# Patient Record
Sex: MALE | Race: OTHER | NOT HISPANIC OR LATINO
[De-identification: names, ages, dates, MRNs, and addresses within clinical notes are randomized per-mention and may not be internally consistent; named-entity substitution may affect disease eponyms.]

---

## 2017-01-09 ENCOUNTER — RX RENEWAL (OUTPATIENT)
Age: 62
End: 2017-01-09

## 2017-02-27 ENCOUNTER — RX RENEWAL (OUTPATIENT)
Age: 62
End: 2017-02-27

## 2017-03-08 ENCOUNTER — APPOINTMENT (OUTPATIENT)
Dept: INTERNAL MEDICINE | Facility: CLINIC | Age: 62
End: 2017-03-08

## 2017-03-08 VITALS
HEART RATE: 72 BPM | HEIGHT: 67 IN | WEIGHT: 216 LBS | SYSTOLIC BLOOD PRESSURE: 128 MMHG | DIASTOLIC BLOOD PRESSURE: 76 MMHG | BODY MASS INDEX: 33.9 KG/M2 | TEMPERATURE: 97.9 F

## 2017-03-10 LAB
25(OH)D3 SERPL-MCNC: 35.6 NG/ML
ALBUMIN SERPL ELPH-MCNC: 4.4 G/DL
ALP BLD-CCNC: 61 U/L
ALT SERPL-CCNC: 33 U/L
ANION GAP SERPL CALC-SCNC: 15 MMOL/L
AST SERPL-CCNC: 28 U/L
BASOPHILS # BLD AUTO: 0.04 K/UL
BASOPHILS NFR BLD AUTO: 0.5 %
BILIRUB SERPL-MCNC: 0.8 MG/DL
BUN SERPL-MCNC: 14 MG/DL
CALCIUM SERPL-MCNC: 9.5 MG/DL
CHLORIDE SERPL-SCNC: 102 MMOL/L
CO2 SERPL-SCNC: 24 MMOL/L
CREAT SERPL-MCNC: 0.97 MG/DL
EOSINOPHIL # BLD AUTO: 0.43 K/UL
EOSINOPHIL NFR BLD AUTO: 5.1 %
FOLATE SERPL-MCNC: 17.4 NG/ML
GLUCOSE SERPL-MCNC: 86 MG/DL
HCT VFR BLD CALC: 45.6 %
HGB BLD-MCNC: 15.6 G/DL
IMM GRANULOCYTES NFR BLD AUTO: 0.2 %
LYMPHOCYTES # BLD AUTO: 3.29 K/UL
LYMPHOCYTES NFR BLD AUTO: 39 %
MAGNESIUM SERPL-MCNC: 2.2 MG/DL
MAN DIFF?: NORMAL
MCHC RBC-ENTMCNC: 32.1 PG
MCHC RBC-ENTMCNC: 34.2 GM/DL
MCV RBC AUTO: 93.8 FL
MONOCYTES # BLD AUTO: 0.77 K/UL
MONOCYTES NFR BLD AUTO: 9.1 %
NEUTROPHILS # BLD AUTO: 3.89 K/UL
NEUTROPHILS NFR BLD AUTO: 46.1 %
PHOSPHATE SERPL-MCNC: 3.6 MG/DL
PLATELET # BLD AUTO: 177 K/UL
POTASSIUM SERPL-SCNC: 3.9 MMOL/L
PROT SERPL-MCNC: 7.1 G/DL
RBC # BLD: 4.86 M/UL
RBC # FLD: 12.7 %
SODIUM SERPL-SCNC: 141 MMOL/L
VIT B12 SERPL-MCNC: 445 PG/ML
WBC # FLD AUTO: 8.44 K/UL

## 2017-04-20 ENCOUNTER — APPOINTMENT (OUTPATIENT)
Dept: INTERNAL MEDICINE | Facility: CLINIC | Age: 62
End: 2017-04-20

## 2017-04-20 VITALS
RESPIRATION RATE: 17 BRPM | DIASTOLIC BLOOD PRESSURE: 80 MMHG | SYSTOLIC BLOOD PRESSURE: 130 MMHG | HEIGHT: 67 IN | HEART RATE: 75 BPM | WEIGHT: 218 LBS | BODY MASS INDEX: 34.21 KG/M2 | TEMPERATURE: 98.3 F

## 2017-06-13 ENCOUNTER — FORM ENCOUNTER (OUTPATIENT)
Age: 62
End: 2017-06-13

## 2017-06-14 ENCOUNTER — APPOINTMENT (OUTPATIENT)
Dept: PULMONOLOGY | Facility: HOSPITAL | Age: 62
End: 2017-06-14

## 2017-06-14 ENCOUNTER — OUTPATIENT (OUTPATIENT)
Dept: OUTPATIENT SERVICES | Facility: HOSPITAL | Age: 62
LOS: 1 days | End: 2017-06-14
Payer: MEDICAID

## 2017-06-14 ENCOUNTER — APPOINTMENT (OUTPATIENT)
Dept: CT IMAGING | Facility: HOSPITAL | Age: 62
End: 2017-06-14

## 2017-06-14 VITALS — BODY MASS INDEX: 29.63 KG/M2 | HEIGHT: 70 IN | WEIGHT: 207 LBS

## 2017-06-14 DIAGNOSIS — Z87.891 PERSONAL HISTORY OF NICOTINE DEPENDENCE: ICD-10-CM

## 2017-06-14 PROCEDURE — G0297: CPT

## 2017-07-12 ENCOUNTER — LABORATORY RESULT (OUTPATIENT)
Age: 62
End: 2017-07-12

## 2017-07-12 ENCOUNTER — APPOINTMENT (OUTPATIENT)
Dept: INTERNAL MEDICINE | Facility: CLINIC | Age: 62
End: 2017-07-12

## 2017-07-12 VITALS
TEMPERATURE: 98.2 F | BODY MASS INDEX: 30.64 KG/M2 | WEIGHT: 214 LBS | SYSTOLIC BLOOD PRESSURE: 126 MMHG | OXYGEN SATURATION: 97 % | HEIGHT: 70 IN | DIASTOLIC BLOOD PRESSURE: 86 MMHG | HEART RATE: 96 BPM

## 2017-07-13 LAB
ALBUMIN SERPL ELPH-MCNC: 4.8 G/DL
ALP BLD-CCNC: 64 U/L
ALT SERPL-CCNC: 36 U/L
ANION GAP SERPL CALC-SCNC: 16 MMOL/L
APPEARANCE: CLEAR
AST SERPL-CCNC: 32 U/L
BASOPHILS # BLD AUTO: 0.05 K/UL
BASOPHILS NFR BLD AUTO: 0.5 %
BILIRUB SERPL-MCNC: 1.4 MG/DL
BILIRUBIN URINE: NEGATIVE
BLOOD URINE: NEGATIVE
BUN SERPL-MCNC: 12 MG/DL
CALCIUM SERPL-MCNC: 9.7 MG/DL
CHLORIDE SERPL-SCNC: 101 MMOL/L
CO2 SERPL-SCNC: 24 MMOL/L
COLOR: YELLOW
CREAT SERPL-MCNC: 0.91 MG/DL
EOSINOPHIL # BLD AUTO: 0.46 K/UL
EOSINOPHIL NFR BLD AUTO: 4.8 %
GLUCOSE QUALITATIVE U: NORMAL MG/DL
GLUCOSE SERPL-MCNC: 84 MG/DL
HCT VFR BLD CALC: 46.5 %
HGB BLD-MCNC: 15.7 G/DL
IMM GRANULOCYTES NFR BLD AUTO: 0.4 %
KETONES URINE: NEGATIVE
LEUKOCYTE ESTERASE URINE: NEGATIVE
LYMPHOCYTES # BLD AUTO: 3.16 K/UL
LYMPHOCYTES NFR BLD AUTO: 33.3 %
MAN DIFF?: NORMAL
MCHC RBC-ENTMCNC: 31.1 PG
MCHC RBC-ENTMCNC: 33.8 GM/DL
MCV RBC AUTO: 92.1 FL
MONOCYTES # BLD AUTO: 1.09 K/UL
MONOCYTES NFR BLD AUTO: 11.5 %
NEUTROPHILS # BLD AUTO: 4.69 K/UL
NEUTROPHILS NFR BLD AUTO: 49.5 %
NITRITE URINE: NEGATIVE
PH URINE: 7
PLATELET # BLD AUTO: 191 K/UL
POTASSIUM SERPL-SCNC: 4.3 MMOL/L
PROT SERPL-MCNC: 8 G/DL
PROTEIN URINE: NEGATIVE MG/DL
PSA SERPL-MCNC: 0.9 NG/ML
RBC # BLD: 5.05 M/UL
RBC # FLD: 12.7 %
SODIUM SERPL-SCNC: 141 MMOL/L
SPECIFIC GRAVITY URINE: 1.01
UROBILINOGEN URINE: 1 MG/DL
WBC # FLD AUTO: 9.49 K/UL

## 2017-07-16 ENCOUNTER — FORM ENCOUNTER (OUTPATIENT)
Age: 62
End: 2017-07-16

## 2017-07-17 ENCOUNTER — APPOINTMENT (OUTPATIENT)
Dept: ULTRASOUND IMAGING | Facility: HOSPITAL | Age: 62
End: 2017-07-17

## 2017-07-17 ENCOUNTER — OUTPATIENT (OUTPATIENT)
Dept: OUTPATIENT SERVICES | Facility: HOSPITAL | Age: 62
LOS: 1 days | End: 2017-07-17
Payer: MEDICAID

## 2017-07-17 DIAGNOSIS — R10.811 RIGHT UPPER QUADRANT ABDOMINAL TENDERNESS: ICD-10-CM

## 2017-07-17 PROCEDURE — 76700 US EXAM ABDOM COMPLETE: CPT | Mod: 26

## 2017-07-17 PROCEDURE — 76700 US EXAM ABDOM COMPLETE: CPT

## 2017-07-18 ENCOUNTER — APPOINTMENT (OUTPATIENT)
Dept: INTERNAL MEDICINE | Facility: CLINIC | Age: 62
End: 2017-07-18

## 2017-07-18 ENCOUNTER — NON-APPOINTMENT (OUTPATIENT)
Age: 62
End: 2017-07-18

## 2017-07-18 VITALS
RESPIRATION RATE: 16 BRPM | DIASTOLIC BLOOD PRESSURE: 80 MMHG | TEMPERATURE: 97.8 F | HEIGHT: 70 IN | WEIGHT: 210 LBS | OXYGEN SATURATION: 98 % | SYSTOLIC BLOOD PRESSURE: 130 MMHG | BODY MASS INDEX: 30.06 KG/M2 | HEART RATE: 64 BPM

## 2017-07-19 ENCOUNTER — APPOINTMENT (OUTPATIENT)
Dept: UROLOGY | Facility: CLINIC | Age: 62
End: 2017-07-19

## 2017-07-19 VITALS
HEART RATE: 71 BPM | DIASTOLIC BLOOD PRESSURE: 92 MMHG | WEIGHT: 213 LBS | SYSTOLIC BLOOD PRESSURE: 150 MMHG | RESPIRATION RATE: 16 BRPM | HEIGHT: 70 IN | TEMPERATURE: 98.2 F | BODY MASS INDEX: 30.49 KG/M2

## 2017-07-19 DIAGNOSIS — Z87.39 PERSONAL HISTORY OF OTHER DISEASES OF THE MUSCULOSKELETAL SYSTEM AND CONNECTIVE TISSUE: ICD-10-CM

## 2017-08-16 ENCOUNTER — APPOINTMENT (OUTPATIENT)
Dept: INTERNAL MEDICINE | Facility: CLINIC | Age: 62
End: 2017-08-16

## 2018-02-23 ENCOUNTER — APPOINTMENT (OUTPATIENT)
Dept: INTERNAL MEDICINE | Facility: CLINIC | Age: 63
End: 2018-02-23
Payer: MEDICAID

## 2018-02-23 VITALS
DIASTOLIC BLOOD PRESSURE: 80 MMHG | WEIGHT: 221 LBS | HEIGHT: 70 IN | HEART RATE: 66 BPM | OXYGEN SATURATION: 98 % | SYSTOLIC BLOOD PRESSURE: 130 MMHG | TEMPERATURE: 98.1 F | BODY MASS INDEX: 31.64 KG/M2

## 2018-02-23 PROCEDURE — 99214 OFFICE O/P EST MOD 30 MIN: CPT

## 2018-02-23 RX ORDER — DEXTRAN 70, AND HYPROMELLOSE 2910 1; 3 MG/ML; MG/ML
SOLUTION/ DROPS OPHTHALMIC
Qty: 15 | Refills: 0 | Status: DISCONTINUED | COMMUNITY
Start: 2017-12-14 | End: 2018-02-23

## 2018-02-25 ENCOUNTER — RX RENEWAL (OUTPATIENT)
Age: 63
End: 2018-02-25

## 2018-02-25 LAB
25(OH)D3 SERPL-MCNC: 16.7 NG/ML
ALBUMIN SERPL ELPH-MCNC: 4.7 G/DL
ALP BLD-CCNC: 72 U/L
ALT SERPL-CCNC: 34 U/L
ANION GAP SERPL CALC-SCNC: 14 MMOL/L
APPEARANCE: CLEAR
AST SERPL-CCNC: 31 U/L
BASOPHILS # BLD AUTO: 0.05 K/UL
BASOPHILS NFR BLD AUTO: 0.6 %
BILIRUB SERPL-MCNC: 0.8 MG/DL
BILIRUBIN URINE: NEGATIVE
BLOOD URINE: NEGATIVE
BUN SERPL-MCNC: 12 MG/DL
CALCIUM SERPL-MCNC: 10.4 MG/DL
CHLORIDE SERPL-SCNC: 103 MMOL/L
CHOLEST SERPL-MCNC: 265 MG/DL
CHOLEST/HDLC SERPL: 4.8 RATIO
CO2 SERPL-SCNC: 25 MMOL/L
COLOR: YELLOW
CREAT SERPL-MCNC: 1.04 MG/DL
CREAT SPEC-SCNC: 80 MG/DL
EOSINOPHIL # BLD AUTO: 0.57 K/UL
EOSINOPHIL NFR BLD AUTO: 7.1 %
ERYTHROCYTE [SEDIMENTATION RATE] IN BLOOD BY WESTERGREN METHOD: 8 MM/HR
FOLATE SERPL-MCNC: >20 NG/ML
GGT SERPL-CCNC: 42 U/L
GLUCOSE QUALITATIVE U: NEGATIVE MG/DL
GLUCOSE SERPL-MCNC: 90 MG/DL
HBA1C MFR BLD HPLC: 5.4 %
HCT VFR BLD CALC: 51.2 %
HDLC SERPL-MCNC: 55 MG/DL
HGB BLD-MCNC: 16.9 G/DL
IMM GRANULOCYTES NFR BLD AUTO: 0.2 %
IRON SATN MFR SERPL: 20 %
IRON SERPL-MCNC: 68 UG/DL
KETONES URINE: NEGATIVE
LDLC SERPL CALC-MCNC: 187 MG/DL
LEUKOCYTE ESTERASE URINE: NEGATIVE
LYMPHOCYTES # BLD AUTO: 2.71 K/UL
LYMPHOCYTES NFR BLD AUTO: 33.6 %
MAN DIFF?: NORMAL
MCHC RBC-ENTMCNC: 31.8 PG
MCHC RBC-ENTMCNC: 33 GM/DL
MCV RBC AUTO: 96.2 FL
MICROALBUMIN 24H UR DL<=1MG/L-MCNC: 0.3 MG/DL
MICROALBUMIN/CREAT 24H UR-RTO: 4 MG/G
MONOCYTES # BLD AUTO: 0.62 K/UL
MONOCYTES NFR BLD AUTO: 7.7 %
NEUTROPHILS # BLD AUTO: 4.1 K/UL
NEUTROPHILS NFR BLD AUTO: 50.8 %
NITRITE URINE: NEGATIVE
PH URINE: 5.5
PLATELET # BLD AUTO: 182 K/UL
POTASSIUM SERPL-SCNC: 4.9 MMOL/L
PROT SERPL-MCNC: 7.9 G/DL
PROTEIN URINE: NEGATIVE MG/DL
RBC # BLD: 5.32 M/UL
RBC # FLD: 13 %
SODIUM SERPL-SCNC: 142 MMOL/L
SPECIFIC GRAVITY URINE: 1.01
T3 SERPL-MCNC: 133 NG/DL
T4 FREE SERPL-MCNC: 1.2 NG/DL
TIBC SERPL-MCNC: 334 UG/DL
TRIGL SERPL-MCNC: 114 MG/DL
TSH SERPL-ACNC: 2.17 UIU/ML
UIBC SERPL-MCNC: 266 UG/DL
UROBILINOGEN URINE: NEGATIVE MG/DL
VIT B12 SERPL-MCNC: 539 PG/ML
WBC # FLD AUTO: 8.07 K/UL

## 2018-02-28 ENCOUNTER — APPOINTMENT (OUTPATIENT)
Dept: INTERNAL MEDICINE | Facility: CLINIC | Age: 63
End: 2018-02-28

## 2018-06-19 ENCOUNTER — APPOINTMENT (OUTPATIENT)
Dept: CT IMAGING | Facility: HOSPITAL | Age: 63
End: 2018-06-19
Payer: MEDICAID

## 2018-06-19 ENCOUNTER — APPOINTMENT (OUTPATIENT)
Dept: PULMONOLOGY | Facility: HOSPITAL | Age: 63
End: 2018-06-19
Payer: MEDICAID

## 2018-06-19 ENCOUNTER — OUTPATIENT (OUTPATIENT)
Dept: OUTPATIENT SERVICES | Facility: HOSPITAL | Age: 63
LOS: 1 days | End: 2018-06-19
Payer: MEDICAID

## 2018-06-19 VITALS — BODY MASS INDEX: 29.63 KG/M2 | WEIGHT: 207 LBS | HEIGHT: 70 IN

## 2018-06-19 DIAGNOSIS — Z87.891 PERSONAL HISTORY OF NICOTINE DEPENDENCE: ICD-10-CM

## 2018-06-19 PROCEDURE — G0296 VISIT TO DETERM LDCT ELIG: CPT

## 2018-06-19 PROCEDURE — 99406 BEHAV CHNG SMOKING 3-10 MIN: CPT

## 2018-06-19 PROCEDURE — G0297: CPT

## 2018-06-19 PROCEDURE — G0297: CPT | Mod: 26

## 2018-06-20 ENCOUNTER — APPOINTMENT (OUTPATIENT)
Dept: PULMONOLOGY | Facility: HOSPITAL | Age: 63
End: 2018-06-20

## 2018-06-20 ENCOUNTER — APPOINTMENT (OUTPATIENT)
Dept: CT IMAGING | Facility: HOSPITAL | Age: 63
End: 2018-06-20

## 2018-07-17 ENCOUNTER — APPOINTMENT (OUTPATIENT)
Dept: UROLOGY | Facility: CLINIC | Age: 63
End: 2018-07-17
Payer: MEDICAID

## 2018-07-17 VITALS
SYSTOLIC BLOOD PRESSURE: 142 MMHG | DIASTOLIC BLOOD PRESSURE: 80 MMHG | HEART RATE: 62 BPM | OXYGEN SATURATION: 97 % | TEMPERATURE: 98.2 F

## 2018-07-17 PROCEDURE — 99213 OFFICE O/P EST LOW 20 MIN: CPT

## 2018-08-01 ENCOUNTER — APPOINTMENT (OUTPATIENT)
Dept: INTERNAL MEDICINE | Facility: CLINIC | Age: 63
End: 2018-08-01
Payer: MEDICAID

## 2018-08-01 VITALS
DIASTOLIC BLOOD PRESSURE: 84 MMHG | HEIGHT: 70 IN | BODY MASS INDEX: 31.07 KG/M2 | SYSTOLIC BLOOD PRESSURE: 130 MMHG | HEART RATE: 74 BPM | OXYGEN SATURATION: 98 % | TEMPERATURE: 98.3 F | WEIGHT: 217 LBS

## 2018-08-01 PROCEDURE — 99203 OFFICE O/P NEW LOW 30 MIN: CPT | Mod: 25

## 2018-08-01 PROCEDURE — 83014 H PYLORI DRUG ADMIN: CPT

## 2018-08-01 PROCEDURE — 82270 OCCULT BLOOD FECES: CPT

## 2018-08-03 LAB
25(OH)D3 SERPL-MCNC: 37.7 NG/ML
A1AT SERPL-MCNC: 123 MG/DL
ALBUMIN SERPL ELPH-MCNC: 4.6 G/DL
ALP BLD-CCNC: 66 U/L
ALT SERPL-CCNC: 23 U/L
ANA PAT FLD IF-IMP: ABNORMAL
ANA SER IF-ACNC: ABNORMAL
ANION GAP SERPL CALC-SCNC: 13 MMOL/L
AST SERPL-CCNC: 27 U/L
BILIRUB SERPL-MCNC: 0.5 MG/DL
BUN SERPL-MCNC: 12 MG/DL
CALCIUM SERPL-MCNC: 9.8 MG/DL
CHLORIDE SERPL-SCNC: 102 MMOL/L
CHOLEST SERPL-MCNC: 214 MG/DL
CHOLEST/HDLC SERPL: 5 RATIO
CO2 SERPL-SCNC: 27 MMOL/L
CREAT SERPL-MCNC: 0.92 MG/DL
FERRITIN SERPL-MCNC: 186 NG/ML
GLUCOSE SERPL-MCNC: 82 MG/DL
HBV SURFACE AB SER QL: NONREACTIVE
HBV SURFACE AG SER QL: REACTIVE
HCV AB SER QL: NONREACTIVE
HCV S/CO RATIO: 0.17 S/CO
HDLC SERPL-MCNC: 43 MG/DL
IRON SATN MFR SERPL: 24 %
IRON SERPL-MCNC: 76 UG/DL
LDLC SERPL CALC-MCNC: 146 MG/DL
MITOCHONDRIA AB SER IF-ACNC: NORMAL
POTASSIUM SERPL-SCNC: 3.9 MMOL/L
PROT SERPL-MCNC: 7.3 G/DL
SMOOTH MUSCLE AB SER QL IF: NORMAL
SODIUM SERPL-SCNC: 142 MMOL/L
TIBC SERPL-MCNC: 315 UG/DL
TRIGL SERPL-MCNC: 124 MG/DL
UIBC SERPL-MCNC: 239 UG/DL
UREA BREATH TEST QL: NEGATIVE

## 2018-08-05 LAB
HBV CORE IGG+IGM SER QL: REACTIVE
HEPATITIS A IGG ANTIBODY: REACTIVE

## 2018-08-14 ENCOUNTER — APPOINTMENT (OUTPATIENT)
Dept: INTERNAL MEDICINE | Facility: CLINIC | Age: 63
End: 2018-08-14
Payer: MEDICAID

## 2018-08-14 ENCOUNTER — NON-APPOINTMENT (OUTPATIENT)
Age: 63
End: 2018-08-14

## 2018-08-14 VITALS
HEART RATE: 68 BPM | SYSTOLIC BLOOD PRESSURE: 120 MMHG | WEIGHT: 213 LBS | BODY MASS INDEX: 30.49 KG/M2 | DIASTOLIC BLOOD PRESSURE: 80 MMHG | TEMPERATURE: 98.3 F | OXYGEN SATURATION: 97 % | HEIGHT: 70 IN | RESPIRATION RATE: 16 BRPM

## 2018-08-14 PROCEDURE — 93000 ELECTROCARDIOGRAM COMPLETE: CPT

## 2018-08-14 PROCEDURE — 99214 OFFICE O/P EST MOD 30 MIN: CPT | Mod: 25

## 2018-08-14 RX ORDER — TERBINAFINE HYDROCHLORIDE 250 MG/1
250 TABLET ORAL
Refills: 0 | Status: DISCONTINUED | COMMUNITY
End: 2018-08-14

## 2018-08-21 ENCOUNTER — LABORATORY RESULT (OUTPATIENT)
Age: 63
End: 2018-08-21

## 2018-08-28 ENCOUNTER — RX RENEWAL (OUTPATIENT)
Age: 63
End: 2018-08-28

## 2018-09-06 ENCOUNTER — APPOINTMENT (OUTPATIENT)
Dept: INTERNAL MEDICINE | Facility: CLINIC | Age: 63
End: 2018-09-06
Payer: MEDICAID

## 2018-09-06 VITALS
TEMPERATURE: 98.5 F | HEART RATE: 65 BPM | HEIGHT: 70 IN | SYSTOLIC BLOOD PRESSURE: 138 MMHG | WEIGHT: 215 LBS | OXYGEN SATURATION: 98 % | DIASTOLIC BLOOD PRESSURE: 78 MMHG | BODY MASS INDEX: 30.78 KG/M2

## 2018-09-06 DIAGNOSIS — K25.9 GASTRIC ULCER, UNSPECIFIED AS ACUTE OR CHRONIC, W/OUT HEMORRHAGE OR PERFORATION: ICD-10-CM

## 2018-09-06 PROCEDURE — 99214 OFFICE O/P EST MOD 30 MIN: CPT

## 2018-09-11 ENCOUNTER — FORM ENCOUNTER (OUTPATIENT)
Age: 63
End: 2018-09-11

## 2018-09-12 ENCOUNTER — APPOINTMENT (OUTPATIENT)
Dept: RADIOLOGY | Facility: HOSPITAL | Age: 63
End: 2018-09-12
Payer: MEDICAID

## 2018-09-12 ENCOUNTER — OUTPATIENT (OUTPATIENT)
Dept: OUTPATIENT SERVICES | Facility: HOSPITAL | Age: 63
LOS: 1 days | End: 2018-09-12
Payer: MEDICAID

## 2018-09-12 ENCOUNTER — APPOINTMENT (OUTPATIENT)
Dept: ULTRASOUND IMAGING | Facility: HOSPITAL | Age: 63
End: 2018-09-12
Payer: MEDICAID

## 2018-09-12 DIAGNOSIS — R10.13 EPIGASTRIC PAIN: ICD-10-CM

## 2018-09-12 DIAGNOSIS — K25.9 GASTRIC ULCER, UNSPECIFIED AS ACUTE OR CHRONIC, WITHOUT HEMORRHAGE OR PERFORATION: ICD-10-CM

## 2018-09-12 DIAGNOSIS — K44.9 DIAPHRAGMATIC HERNIA WITHOUT OBSTRUCTION OR GANGRENE: ICD-10-CM

## 2018-09-12 DIAGNOSIS — K76.0 FATTY (CHANGE OF) LIVER, NOT ELSEWHERE CLASSIFIED: ICD-10-CM

## 2018-09-12 PROCEDURE — 74245: CPT | Mod: 26

## 2018-09-12 PROCEDURE — 74245: CPT

## 2018-09-12 PROCEDURE — 76700 US EXAM ABDOM COMPLETE: CPT | Mod: 26

## 2018-09-12 PROCEDURE — 76700 US EXAM ABDOM COMPLETE: CPT

## 2018-10-03 ENCOUNTER — APPOINTMENT (OUTPATIENT)
Dept: HEPATOLOGY | Facility: CLINIC | Age: 63
End: 2018-10-03
Payer: MEDICAID

## 2018-10-03 VITALS
RESPIRATION RATE: 16 BRPM | WEIGHT: 215 LBS | TEMPERATURE: 98 F | SYSTOLIC BLOOD PRESSURE: 150 MMHG | OXYGEN SATURATION: 96 % | HEIGHT: 70 IN | DIASTOLIC BLOOD PRESSURE: 91 MMHG | HEART RATE: 58 BPM | BODY MASS INDEX: 30.78 KG/M2

## 2018-10-03 PROCEDURE — 99204 OFFICE O/P NEW MOD 45 MIN: CPT

## 2018-10-04 LAB
CHOLEST SERPL-MCNC: 185 MG/DL
CHOLEST/HDLC SERPL: 4.3 RATIO
HBA1C MFR BLD HPLC: 5.3 %
HDLC SERPL-MCNC: 43 MG/DL
INSULIN SERPL-MCNC: 13 UU/ML
LDLC SERPL CALC-MCNC: 128 MG/DL
TRIGL SERPL-MCNC: 70 MG/DL

## 2018-10-05 LAB
HBV CORE IGG+IGM SER QL: REACTIVE
HBV DNA # SERPL NAA+PROBE: 770 IU/ML
HBV E AB SER QL: POSITIVE
HBV E AG SER QL: NEGATIVE
HBV SURFACE AB SER QL: NONREACTIVE
HBV SURFACE AG SER QL: REACTIVE
HEPB DNA PCR LOG: 2.89 LOGIU/ML

## 2018-11-07 ENCOUNTER — APPOINTMENT (OUTPATIENT)
Dept: INTERNAL MEDICINE | Facility: CLINIC | Age: 63
End: 2018-11-07
Payer: MEDICAID

## 2018-11-07 VITALS
OXYGEN SATURATION: 96 % | HEIGHT: 70 IN | DIASTOLIC BLOOD PRESSURE: 76 MMHG | HEART RATE: 67 BPM | BODY MASS INDEX: 30.92 KG/M2 | SYSTOLIC BLOOD PRESSURE: 134 MMHG | WEIGHT: 216 LBS | TEMPERATURE: 98 F

## 2018-11-07 PROCEDURE — 99214 OFFICE O/P EST MOD 30 MIN: CPT

## 2018-11-12 ENCOUNTER — RX RENEWAL (OUTPATIENT)
Age: 63
End: 2018-11-12

## 2018-11-19 LAB
GLUCOSE 1H P LAC PO SERPL-MCNC: 91 MG/DL
GLUCOSE 30M P LAC PO SERPL-MCNC: 99 MG/DL
LACTOSE 1.5H P LAC PO SERPL-SCNC: 87 MG/DL
LACTOSE 2H P 50 G LAC PO SERPL-MCNC: 91 MG/DL
LACTOSE 3H P LAC PO SERPL-MCNC: 80 MG/DL
LACTOSE PRE FAST SERPL-MCNC: 88 MG/DL

## 2019-02-08 NOTE — ASSESSMENT
[FreeTextEntry1] : 63 year old male found to have stable Essential Hypertension, Hypercholesterolemia, Vitamin D Deficiency, NAFLD,with the current regimen, diet and life style modifications, as counseled. Prior results reviewed and discussed with the patient during today's examination. Plan as ordered.\par EKG showed NSR at the rate of 61 BPM, non-sp ST-T changes noted.\par

## 2019-02-08 NOTE — HEALTH RISK ASSESSMENT
[Discussed at today's visit] : Advance Directives Discussed at today's visit [Fully functional (bathing, dressing, toileting, transferring, walking, feeding)] : Fully functional (bathing, dressing, toileting, transferring, walking, feeding) [Fully functional (using the telephone, shopping, preparing meals, housekeeping, doing laundry, using] : Fully functional and needs no help or supervision to perform IADLs (using the telephone, shopping, preparing meals, housekeeping, doing laundry, using transportation, managing medications and managing finances) [No falls in past year] : Patient reported no falls in the past year [0] : 2) Feeling down, depressed, or hopeless: Not at all (0) [] : No [de-identified] : GI/URO/PULM [[id number]] : 0

## 2019-02-08 NOTE — HISTORY OF PRESENT ILLNESS
[Weight Loss ___ Pounds] : Weight loss of [unfilled] pounds [___ # of attempts] : [unfilled] weight lost attempts [Following Diet Successfully] : Following the diet successfully [___ times per week] : Patient exercises [unfilled] times per week [Following  treatment as advised] : Patient is following  treatment as advised [Contemplation] : Contemplation: patient is considering to lose weight within the next 6 months, patient did not attempt to lose weight in the last year. [Good understanding] : Patient has a good understanding of disease, goals and obesity follow-up plan [[employer] ] : provided by [employer]   [FreeTextEntry1] : [id number] [FreeTextEntry2] : Moses [de-identified] : 63 year old  male patient with history of stable Essential Hypertension, Hypercholesterolemia, Vitamin D Deficiency, NAFLD, history as stated, presented for follow up examination of Elevated BP checked. Patient is compliant with all medications. Denies shortness of breath, chest pain or abdominal pains at this time. ROS as stated.\par

## 2019-02-11 RX ORDER — SIMETHICONE 125 MG/1
125 TABLET, CHEWABLE ORAL
Qty: 48 | Refills: 0 | Status: DISCONTINUED | COMMUNITY
Start: 2018-11-07 | End: 2019-02-11

## 2019-02-12 ENCOUNTER — APPOINTMENT (OUTPATIENT)
Dept: INTERNAL MEDICINE | Facility: CLINIC | Age: 64
End: 2019-02-12
Payer: MEDICAID

## 2019-02-12 VITALS
SYSTOLIC BLOOD PRESSURE: 120 MMHG | TEMPERATURE: 97.5 F | HEART RATE: 73 BPM | HEIGHT: 70 IN | WEIGHT: 219 LBS | RESPIRATION RATE: 16 BRPM | DIASTOLIC BLOOD PRESSURE: 80 MMHG | OXYGEN SATURATION: 97 % | BODY MASS INDEX: 31.35 KG/M2

## 2019-02-12 PROCEDURE — 90472 IMMUNIZATION ADMIN EACH ADD: CPT

## 2019-02-12 PROCEDURE — 99214 OFFICE O/P EST MOD 30 MIN: CPT | Mod: 25

## 2019-02-12 PROCEDURE — G0008: CPT

## 2019-02-12 PROCEDURE — 90670 PCV13 VACCINE IM: CPT

## 2019-02-12 PROCEDURE — 90686 IIV4 VACC NO PRSV 0.5 ML IM: CPT

## 2019-02-12 NOTE — HISTORY OF PRESENT ILLNESS
[[employer] ] : provided by [employer]   [[facility]] : [id number] [FreeTextEntry2] : [place] [de-identified] : 63 year old  male patient with history of stable Essential Hypertension, Hypercholesterolemia, Vitamin D Deficiency, NAFLD,with , history as stated, presented for follow up examination. Patient is compliant with all medications. Denies shortness of breath, chest pain or abdominal pains at this time. ROS as stated.\par

## 2019-02-12 NOTE — ASSESSMENT
[FreeTextEntry1] : 63 year old male found to have stable Essential Hypertension, Hypercholesterolemia, Vitamin D Deficiency, NAFLD,with the current regimen, diet and life style modifications, as counseled. Prior results reviewed and discussed with the patient during today's examination. Plan as ordered.\par

## 2019-02-15 ENCOUNTER — RX RENEWAL (OUTPATIENT)
Age: 64
End: 2019-02-15

## 2019-02-16 LAB
25(OH)D3 SERPL-MCNC: 19 NG/ML
ALBUMIN SERPL ELPH-MCNC: 4.7 G/DL
ALP BLD-CCNC: 74 U/L
ALT SERPL-CCNC: 29 U/L
ANION GAP SERPL CALC-SCNC: 12 MMOL/L
APPEARANCE: CLEAR
AST SERPL-CCNC: 28 U/L
BASOPHILS # BLD AUTO: 0.08 K/UL
BASOPHILS NFR BLD AUTO: 0.7 %
BILIRUB SERPL-MCNC: 0.8 MG/DL
BILIRUBIN URINE: NEGATIVE
BLOOD URINE: NEGATIVE
BUN SERPL-MCNC: 16 MG/DL
CALCIUM SERPL-MCNC: 10.2 MG/DL
CHLORIDE SERPL-SCNC: 106 MMOL/L
CHOLEST SERPL-MCNC: 226 MG/DL
CHOLEST/HDLC SERPL: 4.6 RATIO
CO2 SERPL-SCNC: 27 MMOL/L
COLOR: YELLOW
CREAT SERPL-MCNC: 1.02 MG/DL
CREAT SPEC-SCNC: 126 MG/DL
EOSINOPHIL # BLD AUTO: 0.46 K/UL
EOSINOPHIL NFR BLD AUTO: 4.1 %
ERYTHROCYTE [SEDIMENTATION RATE] IN BLOOD BY WESTERGREN METHOD: 8 MM/HR
FOLATE SERPL-MCNC: 17.8 NG/ML
GGT SERPL-CCNC: 31 U/L
GLUCOSE QUALITATIVE U: NEGATIVE MG/DL
GLUCOSE SERPL-MCNC: 86 MG/DL
HBA1C MFR BLD HPLC: 5.4 %
HCT VFR BLD CALC: 48.7 %
HDLC SERPL-MCNC: 49 MG/DL
HGB BLD-MCNC: 16 G/DL
IMM GRANULOCYTES NFR BLD AUTO: 0.3 %
IRON SATN MFR SERPL: 18 %
IRON SERPL-MCNC: 59 UG/DL
KETONES URINE: NEGATIVE
LDLC SERPL CALC-MCNC: 152 MG/DL
LEUKOCYTE ESTERASE URINE: NEGATIVE
LYMPHOCYTES # BLD AUTO: 2.75 K/UL
LYMPHOCYTES NFR BLD AUTO: 24.4 %
MAN DIFF?: NORMAL
MCHC RBC-ENTMCNC: 31 PG
MCHC RBC-ENTMCNC: 32.9 GM/DL
MCV RBC AUTO: 94.4 FL
MICROALBUMIN 24H UR DL<=1MG/L-MCNC: <1.2 MG/DL
MICROALBUMIN/CREAT 24H UR-RTO: NORMAL
MONOCYTES # BLD AUTO: 1.02 K/UL
MONOCYTES NFR BLD AUTO: 9 %
NEUTROPHILS # BLD AUTO: 6.95 K/UL
NEUTROPHILS NFR BLD AUTO: 61.5 %
NITRITE URINE: NEGATIVE
PH URINE: 6
PLATELET # BLD AUTO: 205 K/UL
POTASSIUM SERPL-SCNC: 5 MMOL/L
PROT SERPL-MCNC: 8.1 G/DL
PROTEIN URINE: NEGATIVE MG/DL
PSA FREE FLD-MCNC: 33 %
PSA FREE SERPL-MCNC: 0.3 NG/ML
PSA SERPL-MCNC: 0.9 NG/ML
RBC # BLD: 5.16 M/UL
RBC # FLD: 13.4 %
SODIUM SERPL-SCNC: 145 MMOL/L
SPECIFIC GRAVITY URINE: 1.02
T3 SERPL-MCNC: 135 NG/DL
T4 FREE SERPL-MCNC: 1 NG/DL
TIBC SERPL-MCNC: 321 UG/DL
TRIGL SERPL-MCNC: 126 MG/DL
TSH SERPL-ACNC: 3.22 UIU/ML
UIBC SERPL-MCNC: 262 UG/DL
UROBILINOGEN URINE: NEGATIVE MG/DL
VIT B12 SERPL-MCNC: 462 PG/ML
WBC # FLD AUTO: 11.29 K/UL

## 2019-02-20 ENCOUNTER — APPOINTMENT (OUTPATIENT)
Dept: INTERNAL MEDICINE | Facility: CLINIC | Age: 64
End: 2019-02-20
Payer: MEDICAID

## 2019-02-20 VITALS
HEIGHT: 70 IN | DIASTOLIC BLOOD PRESSURE: 63 MMHG | WEIGHT: 217 LBS | OXYGEN SATURATION: 97 % | HEART RATE: 63 BPM | SYSTOLIC BLOOD PRESSURE: 153 MMHG | TEMPERATURE: 97.7 F | BODY MASS INDEX: 31.07 KG/M2

## 2019-02-20 PROCEDURE — 99204 OFFICE O/P NEW MOD 45 MIN: CPT

## 2019-02-20 NOTE — CONSULT LETTER
[Dear  ___] : Dear  [unfilled], [Courtesy Letter:] : I had the pleasure of seeing your patient, [unfilled], in my office today. [Please see my note below.] : Please see my note below. [Sincerely,] : Sincerely, [FreeTextEntry3] : Fei Martins Mdfacp

## 2019-02-20 NOTE — ASSESSMENT
[FreeTextEntry1] : diverticuli of small bowel He presently having no significant abd discomfort. he has not been taking lactaid before each meal which could be reason why he sometimes has increased gas.  he will continue to take probiotic daily.  Overall pt is doing well .   abdominal hernia small weakness in abdominal wall noted no incarceration and we discussed what this is and will watch it.  We discussed itf it became stuck he needs to go to er immediately .  he should avoid lifting heavy objects and discussed repair.  he will think about whether he wants to see a surgeon or just watch it.  lactose intolerance  Lactose intolerance is a condition that makes it hard for your body to digest milk and foods made with milk (called dairy products). If you have lactose intolerance and you eat dairy products, you can get diarrhea, belly pain, and gas.\par \par Lactose intolerance can affect anyone. But it is most common among , , and black people.\par \par In people who do not have lactose intolerance, the body makes a protein called an "enzyme" that breaks down lactose, the main form of sugar found in milk. In people who do have lactose intolerance, the body either does not make enough of the enzyme, or the enzyme does not work as well as it should. Also, some infections, such as you might get with food poisoning, can damage the enzyme. But if that happens, the problem usually goes away within a few weeks. Luckily, people with lactose intolerance can take an enzyme supplement to help with their problem.\par \par What are the symptoms of lactose intolerance? — The symptoms happen only after you eat dairy foods. They can include:\par \par ?Cramps or belly pain (usually around or below the belly button)\par \par ?Bloating (feeling like your belly is full of air)\par \par ?Gas\par \par ?Diarrhea (often it is bulky, foamy, and watery)\par \par ?Vomiting (this happens mostly in teens)\par \par \par Is there a test for lactose intolerance? — Yes, there are 2 ways to test for lactose intolerance. One is a breathing test, and one is a blood test. The breathing test is more common.\par \par Your doctor or nurse will tell you how to prepare for your test. You will not be able to eat or drink anything for several hours before the test. Plus, you might have to change your medicines or stop smoking for a while before the test.\par \par ?Lactose hydrogen breath test – For this test, you drink a liquid that has lactose in it. Then you breathe into a special machine every 30 minutes. The machine measures how much hydrogen you breathe out. People who have lactose intolerance breathe out more hydrogen than normal.\par \par \par ?Lactose tolerance test – For this test, you drink a liquid that has lactose in it. The doctor or nurse will take blood samples from you when the test starts, and again 1 and 2 hours later. If your blood has low levels of sugar after you drink the lactose, it means you probably have lactose intolerance.\par \par \par Should I see a doctor or nurse? — Yes. If you think you might have lactose intolerance, tell your doctor or nurse. He or she can ask you questions to make sure that there are no other problems.\par \par How is lactose intolerance treated? — Treatment differs depending on how severe the problem is. But in general, treatment can include:\par \par ?Eating less dairy food\par \par ?Finding non-dairy sources of nutrients (such as calcium and vitamin D) and protein\par \par ?Taking an enzyme supplement that will help you break down dairy foods\par \par \par How do I reduce the amount of dairy foods I eat? — You can start by cutting down but not stopping foods you know contain dairy. Dairy foods should be consumed with meals. Dairy foods include milk, cream, ice cream, yogurt, cheese, and butter. This table shows how much lactose is in some common dairy foods (table 1).\par \par Your doctor or nurse might suggest that you talk to a nutritionist to learn which foods have lactose. The nutritionist can also make sure that you get enough calcium and vitamin D in your diet.\par \par If you are really sensitive to dairy foods or lactose, you will also need to read the labels on everything you eat. Milk or lactose is sometimes added to foods you might not suspect, such as cereal, instant soups, and salad dressings. Check the ingredient list of foods for anything that might suggest lactose. Look for these words:\par \par ?Milk, "milk byproducts," "dry milk powder," and "dry milk solids"\par \par ?Lactose\par \par ?Whey (whey is milk that has gone sour)\par \par \par Although some medicines are made with lactose, most people who are lactose intolerant can handle the very small amount in medicines.\par \par Which enzyme supplement should I use? — There are many enzyme supplements to choose from, including Lactaid (tablets or liquid), Lactrase, LactAce, Dairy Ease, and Lactrol. You should take the supplement right before you start eating. If you forget, you can take it during the meal, but it might not work as well.\par \par The important thing to know is that each product works a bit differently for each person. Plus, none of them can break down every last bit of lactose, so some people still have symptoms even with an enzyme supplement.\par \par Should I take calcium or vitamin D supplements? — That depends on whether you completely avoid dairy foods. If you do, your doctor or nurse might recommend calcium supplements. He or she might also check your vitamin D levels to decide whether you should take supplements.\par \par Is lactose intolerance basically a food allergy? — No. There are people who are allergic to milk and dairy foods. But the symptoms of a dairy allergy are often different from those of lactose intolerance. In the case of an allergy, the body reacts to the protein in milk, rather than to the sugar. Plus, allergies involve the body's infection-fighting system, called the immune system. Lactose intolerance does not.\par

## 2019-02-20 NOTE — PHYSICAL EXAM
[General Appearance - Alert] : alert [General Appearance - In No Acute Distress] : in no acute distress [PERRL With Normal Accommodation] : pupils were equal in size, round, and reactive to light [Examination Of The Oral Cavity] : the lips and gums were normal [Oropharynx] : the oropharynx was normal [Auscultation Breath Sounds / Voice Sounds] : lungs were clear to auscultation bilaterally [Apical Impulse] : the apical impulse was normal [Heart Rate And Rhythm] : heart rate was normal and rhythm regular [Heart Sounds] : normal S1 and S2 [Heart Sounds Gallop] : no gallops [Edema] : there was no peripheral edema [Bowel Sounds] : normal bowel sounds [Abdomen Soft] : soft [Abdomen Tenderness] : non-tender [Abdomen Mass (___ Cm)] : no abdominal mass palpated [FreeTextEntry1] : above umbilicus a small weakness in the wall and hernia palpated.   [Cervical Lymph Nodes Enlarged Posterior Bilaterally] : posterior cervical [Cervical Lymph Nodes Enlarged Anterior Bilaterally] : anterior cervical [Supraclavicular Lymph Nodes Enlarged Bilaterally] : supraclavicular [Axillary Lymph Nodes Enlarged Bilaterally] : axillary [Femoral Lymph Nodes Enlarged Bilaterally] : femoral [Inguinal Lymph Nodes Enlarged Bilaterally] : inguinal [No CVA Tenderness] : no ~M costovertebral angle tenderness [No Spinal Tenderness] : no spinal tenderness [Abnormal Walk] : normal gait [Nail Clubbing] : no clubbing  or cyanosis of the fingernails [Musculoskeletal - Swelling] : no joint swelling seen [Motor Tone] : muscle strength and tone were normal [Skin Color & Pigmentation] : normal skin color and pigmentation [Skin Turgor] : normal skin turgor [] : no rash [Deep Tendon Reflexes (DTR)] : deep tendon reflexes were 2+ and symmetric [Sensation] : the sensory exam was normal to light touch and pinprick [No Focal Deficits] : no focal deficits [Oriented To Time, Place, And Person] : oriented to person, place, and time [Impaired Insight] : insight and judgment were intact [Affect] : the affect was normal

## 2019-02-20 NOTE — REASON FOR VISIT
[Follow-Up: _____] : a [unfilled] follow-up visit [[employer] ] : provided by [employer]   [FreeTextEntry1] : [id number] [FreeTextEntry2] : tyson

## 2019-02-20 NOTE — HISTORY OF PRESENT ILLNESS
[Heartburn] : denies heartburn [Nausea] : denies nausea [Vomiting] : denies vomiting [Diarrhea] : denies diarrhea [Constipation] : denies constipation [Yellow Skin Or Eyes (Jaundice)] : denies jaundice [Abdominal Pain] : denies abdominal pain [Abdominal Swelling] : denies abdominal swelling [Rectal Pain] : denies rectal pain [Wt Gain ___ Lbs] : no recent weight gain [Wt Loss ___ Lbs] : recent [unfilled] ~Upound(s) weight loss [GERD] : gastroesophageal reflux disease [Hiatus Hernia] : hiatus hernia [Good Compliance] : good compliance with treatment [de-identified] : Pt is feeling well and has improvement of his gas.  he is taking probiotic daily.  No diarrhea or reflux.  He doesn’t have abdominal pain.  He is to have fibroscan in March.

## 2019-03-01 ENCOUNTER — RX RENEWAL (OUTPATIENT)
Age: 64
End: 2019-03-01

## 2019-03-14 ENCOUNTER — APPOINTMENT (OUTPATIENT)
Dept: HEPATOLOGY | Facility: CLINIC | Age: 64
End: 2019-03-14
Payer: MEDICAID

## 2019-03-14 VITALS
DIASTOLIC BLOOD PRESSURE: 84 MMHG | HEIGHT: 70 IN | SYSTOLIC BLOOD PRESSURE: 145 MMHG | WEIGHT: 222 LBS | TEMPERATURE: 97.9 F | BODY MASS INDEX: 31.78 KG/M2 | RESPIRATION RATE: 17 BRPM | HEART RATE: 66 BPM

## 2019-03-14 PROCEDURE — ZZZZZ: CPT

## 2019-03-14 PROCEDURE — 99214 OFFICE O/P EST MOD 30 MIN: CPT | Mod: 25

## 2019-03-14 PROCEDURE — 91200 LIVER ELASTOGRAPHY: CPT

## 2019-03-14 NOTE — HISTORY OF PRESENT ILLNESS
[Alcohol Abuse] : alcohol abuse [___ Month(s) Ago] : [unfilled] month(s) ago [None] : ~he/she~ had no significant interval events [IV Drug Use] : no IV drug use [Tattoo] : no tattoos [Body Piercing] : no body piercing [Hemodialysis] : no hemodialysis [Transfusion before 1992] : no transfusion before 1992 [Transplant before 1992] : no transplant before 1992 [Occupational Exposure] : no occupational exposure [Cocaine Use] : no cocaine use [de-identified] : A 63 year-old man from [place] with history of dyslipidemia,  high BMI of 30.9.and excessive drinking returns for followup regarding chronic hepatitis B and fatty liver. \par \par He reportedly contracted hepatitis B at age 14 , and developed jaundice. His brother  of alcohol liver disease and liver cancer at age of 64. \par \par He used to drink alcohol excessively for many years and has been drinking  3-4 beers daily on weekends, and 6-7 beers per day on weekends for years. He reportedly quit drinking 5 months ago. \par \par 2018 AST 27, ALT 23, ALP 66, TB 0.5, anti-HAV IgG positive, anti-HBs negative, anti-HBc positive, Anti-HCV negative, TEN 1:80, SMA , and AMA were negative, A1AT was normal, iron panel was normal.\par \par 2018 abdominal US reported fatty infiltration of the liver, normal spleen or no ascites. normal GB and bile ducts. \par \par Interval and current history:\par Patient was seen 5 months ago.\par 10/2018 HBsAg positive, eAb positive, eAg negative, anti-HBc total positive, anti-HBs negative, lipid profile normal, A1C normal. HBV  \par \par 2019 liver tests were normal, ALT 29, triglyceride 126, total cholesterol 226, HDL 49, , A1c 5.4, WBC 11.3, hemoglobin 16, platelets 205 K. \par \par Fibroscan from today reported CAP of 242, and kPa of 5.8. Official report is pending. \par He feels fine and offers no complaints. \par ROS: as below

## 2019-03-14 NOTE — PHYSICAL EXAM
[Cognitive Mini-Mental Status Normal?] : Cognitive Mini Mental Status Exam is normal [Scleral Icterus] : No Scleral Icterus [Abdominal  Ascites] : no ascites [Splenomegaly] : no splenomegaly [Liver Palpable ___ Finger Breadths Below Costal Margin] : was not palpable below costal margin [Asterixis] : no asterixis observed [Jaundice] : No jaundice

## 2019-03-14 NOTE — REVIEW OF SYSTEMS
[Constipation] : constipation [Heartburn] : heartburn [Joint Pain] : joint pain [Recent Weight Gain (___ Lbs)] : recent [unfilled] ~Ulb weight gain [Fever] : no fever [Chills] : no chills [Feeling Tired] : not feeling tired [Eye Pain] : no eye pain [Red Eyes] : eyes not red [Dry Eyes] : no dryness of the eyes [Chest Pain] : no chest pain [Palpitations] : no palpitations [Lower Ext Edema] : no extremity edema [Shortness Of Breath] : no shortness of breath [Wheezing] : no wheezing [Cough] : no cough [Abdominal Pain] : no abdominal pain [Vomiting] : no vomiting [Diarrhea] : no diarrhea [Melena] : no melena [Dysuria] : no dysuria [Joint Swelling] : no joint swelling [Limb Pain] : no limb pain [Limb Swelling] : no limb swelling [Itching] : no itching [Easy Bleeding] : no tendency for easy bleeding [Easy Bruising] : no tendency for easy bruising [FreeTextEntry7] : he denies nausea, hematemesis, rectal bleeding or jaundice

## 2019-03-14 NOTE — ASSESSMENT
[FreeTextEntry1] : A 63 year-old man from [place] with history of dyslipidemia,  high BMI of 30.9.and excessive drinking is being seen for chronic hepatitis B and fatty liver. \par \par He used to drink alcohol excessively for many years and has been drinking  3-4 beers daily on weekends, and 6-7 beers per day on weekends for years. He reportedly quit  5 months ago. \par \par He is immune to hepatitis A  never exposed to HCV, \par 9/12/2018 abdominal US reported fatty infiltration of the liver, normal spleen or no ascites. normal GB and bile ducts. \par \par Fibroscan from today reported CAP of 242, and kPa of 5.8. Official report is pending. \par \par Patient has chronic hepatitis B, eAb positive, with low HBV viremia of 770, normal PLT, and no focal liver mass on abdominal US,  and alcoholic fatty liver. (Although he has MetS of dyslipidemia , hypertension, and high BMI of > 30, his excessive drinking of > 21 drinks per week in the past exceeded the definition of NAFLD). HBV treatment is not recommended given he does not meet treatment criteria. \par \par I have explained to him the natural history of chronic hepatitis B, alcoholic liver disease with fatty liver, disease progression to steatohepatitis, cirrhosis and risk of HCC from chronic hepatitis B and fatty liver. \par \par I have requested blood work and abdominal US today and asked him to call for results. \par I have emphasized the importance of alcohol abstinence, lifestyle modification with a healthy diet and regular exercise, control of dyslipidemia to reduce risks for NAFLD as well. avoidance of hepatotoxic agents, and a return visit for followup in 6 months.

## 2019-03-16 LAB
AFP-TM SERPL-MCNC: <1.8 NG/ML
ALBUMIN SERPL ELPH-MCNC: 4.4 G/DL
ALP BLD-CCNC: 70 U/L
ALT SERPL-CCNC: 29 U/L
AST SERPL-CCNC: 29 U/L
BASOPHILS # BLD AUTO: 0.08 K/UL
BASOPHILS NFR BLD AUTO: 0.9 %
BILIRUB DIRECT SERPL-MCNC: 0.2 MG/DL
BILIRUB INDIRECT SERPL-MCNC: 0.8 MG/DL
BILIRUB SERPL-MCNC: 1.1 MG/DL
EOSINOPHIL # BLD AUTO: 0.36 K/UL
EOSINOPHIL NFR BLD AUTO: 3.9 %
HBV SURFACE AB SER QL: NONREACTIVE
HBV SURFACE AG SER QL: REACTIVE
HCT VFR BLD CALC: 47.6 %
HGB BLD-MCNC: 15.5 G/DL
IMM GRANULOCYTES NFR BLD AUTO: 0.3 %
LYMPHOCYTES # BLD AUTO: 2.99 K/UL
LYMPHOCYTES NFR BLD AUTO: 32.2 %
MAN DIFF?: NORMAL
MCHC RBC-ENTMCNC: 30.8 PG
MCHC RBC-ENTMCNC: 32.6 GM/DL
MCV RBC AUTO: 94.4 FL
MONOCYTES # BLD AUTO: 0.9 K/UL
MONOCYTES NFR BLD AUTO: 9.7 %
NEUTROPHILS # BLD AUTO: 4.94 K/UL
NEUTROPHILS NFR BLD AUTO: 53 %
PLATELET # BLD AUTO: 198 K/UL
PROT SERPL-MCNC: 7.4 G/DL
RBC # BLD: 5.04 M/UL
RBC # FLD: 12.2 %
WBC # FLD AUTO: 9.3 K/UL

## 2019-03-18 LAB
HBV DNA # SERPL NAA+PROBE: 753 IU/ML
HEPB DNA PCR LOG: 2.88

## 2019-03-19 LAB
HBV E AB SER QL: POSITIVE
HBV E AG SER QL: NEGATIVE

## 2019-03-20 ENCOUNTER — FORM ENCOUNTER (OUTPATIENT)
Age: 64
End: 2019-03-20

## 2019-03-21 ENCOUNTER — OUTPATIENT (OUTPATIENT)
Dept: OUTPATIENT SERVICES | Facility: HOSPITAL | Age: 64
LOS: 1 days | End: 2019-03-21
Payer: MEDICAID

## 2019-03-21 ENCOUNTER — APPOINTMENT (OUTPATIENT)
Dept: ULTRASOUND IMAGING | Facility: CLINIC | Age: 64
End: 2019-03-21
Payer: MEDICAID

## 2019-03-21 DIAGNOSIS — B18.1 CHRONIC VIRAL HEPATITIS B WITHOUT DELTA-AGENT: ICD-10-CM

## 2019-03-21 DIAGNOSIS — K76.0 FATTY (CHANGE OF) LIVER, NOT ELSEWHERE CLASSIFIED: ICD-10-CM

## 2019-03-21 DIAGNOSIS — K46.9 UNSPECIFIED ABDOMINAL HERNIA WITHOUT OBSTRUCTION OR GANGRENE: ICD-10-CM

## 2019-03-21 PROCEDURE — 76700 US EXAM ABDOM COMPLETE: CPT | Mod: 26

## 2019-03-21 PROCEDURE — 76700 US EXAM ABDOM COMPLETE: CPT

## 2019-06-24 ENCOUNTER — RX RENEWAL (OUTPATIENT)
Age: 64
End: 2019-06-24

## 2019-06-27 ENCOUNTER — OUTPATIENT (OUTPATIENT)
Dept: OUTPATIENT SERVICES | Facility: HOSPITAL | Age: 64
LOS: 1 days | End: 2019-06-27
Payer: MEDICAID

## 2019-06-27 ENCOUNTER — APPOINTMENT (OUTPATIENT)
Dept: PULMONOLOGY | Facility: HOSPITAL | Age: 64
End: 2019-06-27
Payer: MEDICAID

## 2019-06-27 ENCOUNTER — APPOINTMENT (OUTPATIENT)
Dept: CT IMAGING | Facility: HOSPITAL | Age: 64
End: 2019-06-27

## 2019-06-27 VITALS — WEIGHT: 215 LBS | BODY MASS INDEX: 24.88 KG/M2 | HEIGHT: 78 IN

## 2019-06-27 DIAGNOSIS — Z87.891 PERSONAL HISTORY OF NICOTINE DEPENDENCE: ICD-10-CM

## 2019-06-27 PROCEDURE — G0296 VISIT TO DETERM LDCT ELIG: CPT

## 2019-06-27 PROCEDURE — G0297: CPT | Mod: 26

## 2019-06-27 PROCEDURE — G0297: CPT

## 2019-07-16 ENCOUNTER — APPOINTMENT (OUTPATIENT)
Dept: UROLOGY | Facility: CLINIC | Age: 64
End: 2019-07-16
Payer: MEDICAID

## 2019-07-16 VITALS
HEART RATE: 68 BPM | HEIGHT: 78 IN | WEIGHT: 216 LBS | SYSTOLIC BLOOD PRESSURE: 142 MMHG | DIASTOLIC BLOOD PRESSURE: 82 MMHG | BODY MASS INDEX: 24.99 KG/M2

## 2019-07-16 PROCEDURE — 99213 OFFICE O/P EST LOW 20 MIN: CPT

## 2019-07-16 NOTE — HISTORY OF PRESENT ILLNESS
[FreeTextEntry1] : Very pleasant 64-year-old gentleman who presents for follow up of BPH, screening for prostate cancer. Patient reports no difficulty with urination. Reports nocturia x2. No daytime frequency or urgency. He describes his urinary stream is strong. He feels well. PSA 2/2019 was 0.9. [Urinary Incontinence] : no urinary incontinence [Urinary Retention] : no urinary retention [Urinary Urgency] : no urinary urgency [Urinary Frequency] : no urinary frequency [Nocturia] : nocturia [Straining] : no straining [Weak Stream] : no weak stream [Dysuria] : no dysuria [Hematuria - Gross] : no gross hematuria [Bladder Spasm] : no bladder spasm [Abdominal Pain] : no abdominal pain [Flank Pain] : no flank pain [Fever] : no fever [Fatigue] : no fatigue [Nausea] : no nausea [Anorexia] : no anorexia

## 2019-07-16 NOTE — ASSESSMENT
[FreeTextEntry1] : Very pleasant 64-year-old gentleman who presents for followup of BPH and screening for prostate cancer\par -Patient remains asymptomatic from BPH; we will defer treatment given these findings\par -Follow up in one year with PSA prior to visit\par -Labs reviewed PSA 0.9

## 2019-08-13 ENCOUNTER — APPOINTMENT (OUTPATIENT)
Dept: INTERNAL MEDICINE | Facility: CLINIC | Age: 64
End: 2019-08-13
Payer: MEDICAID

## 2019-08-13 ENCOUNTER — NON-APPOINTMENT (OUTPATIENT)
Age: 64
End: 2019-08-13

## 2019-08-13 VITALS
HEIGHT: 78 IN | BODY MASS INDEX: 24.76 KG/M2 | RESPIRATION RATE: 17 BRPM | WEIGHT: 214 LBS | TEMPERATURE: 98.1 F | OXYGEN SATURATION: 97 % | HEART RATE: 59 BPM | DIASTOLIC BLOOD PRESSURE: 80 MMHG | SYSTOLIC BLOOD PRESSURE: 130 MMHG

## 2019-08-13 PROCEDURE — 93000 ELECTROCARDIOGRAM COMPLETE: CPT

## 2019-08-13 PROCEDURE — 99214 OFFICE O/P EST MOD 30 MIN: CPT | Mod: 25

## 2019-08-13 NOTE — ASSESSMENT
[FreeTextEntry1] : 64 year old male found to have stable Essential Hypertension, Hypercholesterolemia, Vitamin D Deficiency, NAFLD,with the current regimen, diet and life style modifications, as counseled. Prior results reviewed and discussed with the patient during today's examination. Plan as ordered.\par EKG showed NSR at the rate of 63 BPM, non-sp ST-T changes noted.\par

## 2019-08-13 NOTE — HISTORY OF PRESENT ILLNESS
[[employer] ] : provided by [employer]   [FreeTextEntry1] : [id number] [FreeTextEntry2] : Mar [de-identified] : 64 year old  male patient with history of stable Essential Hypertension, Hypercholesterolemia, Vitamin D Deficiency, NAFLD,with , history as stated, presented for follow up examination. Patient is compliant with all medications. Denies shortness of breath, chest pain or abdominal pains at this time. ROS as stated.\par

## 2019-08-13 NOTE — HEALTH RISK ASSESSMENT
[No] : In the past 12 months have you used drugs other than those required for medical reasons? No [No falls in past year] : Patient reported no falls in the past year [0] : 2) Feeling down, depressed, or hopeless: Not at all (0) [] : No [de-identified] : URO [[id number]] : 0

## 2019-08-14 LAB
ALBUMIN SERPL ELPH-MCNC: 4.8 G/DL
ALP BLD-CCNC: 71 U/L
ALT SERPL-CCNC: 25 U/L
ANION GAP SERPL CALC-SCNC: 14 MMOL/L
AST SERPL-CCNC: 31 U/L
BASOPHILS # BLD AUTO: 0.06 K/UL
BASOPHILS NFR BLD AUTO: 0.7 %
BILIRUB SERPL-MCNC: 1.4 MG/DL
BUN SERPL-MCNC: 14 MG/DL
CALCIUM SERPL-MCNC: 9.8 MG/DL
CHLORIDE SERPL-SCNC: 103 MMOL/L
CHOLEST SERPL-MCNC: 223 MG/DL
CHOLEST/HDLC SERPL: 5.2 RATIO
CO2 SERPL-SCNC: 23 MMOL/L
CREAT SERPL-MCNC: 1.02 MG/DL
EOSINOPHIL # BLD AUTO: 0.43 K/UL
EOSINOPHIL NFR BLD AUTO: 4.8 %
ESTIMATED AVERAGE GLUCOSE: 103 MG/DL
GGT SERPL-CCNC: 31 U/L
GLUCOSE SERPL-MCNC: 80 MG/DL
HBA1C MFR BLD HPLC: 5.2 %
HCT VFR BLD CALC: 50.4 %
HDLC SERPL-MCNC: 43 MG/DL
HGB BLD-MCNC: 16.3 G/DL
IMM GRANULOCYTES NFR BLD AUTO: 0.3 %
LDLC SERPL CALC-MCNC: 156 MG/DL
LYMPHOCYTES # BLD AUTO: 2.75 K/UL
LYMPHOCYTES NFR BLD AUTO: 30.5 %
MAN DIFF?: NORMAL
MCHC RBC-ENTMCNC: 30.6 PG
MCHC RBC-ENTMCNC: 32.3 GM/DL
MCV RBC AUTO: 94.6 FL
MONOCYTES # BLD AUTO: 0.82 K/UL
MONOCYTES NFR BLD AUTO: 9.1 %
NEUTROPHILS # BLD AUTO: 4.93 K/UL
NEUTROPHILS NFR BLD AUTO: 54.6 %
PLATELET # BLD AUTO: 186 K/UL
POTASSIUM SERPL-SCNC: 5.1 MMOL/L
PROT SERPL-MCNC: 7.5 G/DL
RBC # BLD: 5.33 M/UL
RBC # FLD: 12.7 %
SODIUM SERPL-SCNC: 140 MMOL/L
TRIGL SERPL-MCNC: 118 MG/DL
WBC # FLD AUTO: 9.02 K/UL

## 2019-08-20 ENCOUNTER — APPOINTMENT (OUTPATIENT)
Dept: INTERNAL MEDICINE | Facility: CLINIC | Age: 64
End: 2019-08-20
Payer: MEDICAID

## 2019-08-20 VITALS
SYSTOLIC BLOOD PRESSURE: 131 MMHG | WEIGHT: 215 LBS | BODY MASS INDEX: 30.78 KG/M2 | HEIGHT: 70 IN | DIASTOLIC BLOOD PRESSURE: 85 MMHG | TEMPERATURE: 98 F | HEART RATE: 70 BPM

## 2019-08-20 PROCEDURE — 46600 DIAGNOSTIC ANOSCOPY SPX: CPT

## 2019-08-20 PROCEDURE — 99213 OFFICE O/P EST LOW 20 MIN: CPT | Mod: 25

## 2019-08-20 RX ORDER — PANTOPRAZOLE 40 MG/1
40 TABLET, DELAYED RELEASE ORAL
Qty: 30 | Refills: 2 | Status: COMPLETED | COMMUNITY
Start: 2018-08-01 | End: 2019-08-20

## 2019-08-20 RX ORDER — GREEN TEA/HOODIA GORDONII 315-12.5MG
CAPSULE ORAL
Qty: 30 | Refills: 3 | Status: COMPLETED | COMMUNITY
Start: 2018-11-07 | End: 2019-08-20

## 2019-08-20 NOTE — HISTORY OF PRESENT ILLNESS
[Heartburn] : stable heartburn [Vomiting] : denies vomiting [Nausea] : denies nausea [Diarrhea] : denies diarrhea [Constipation] : improvement in constipation [Yellow Skin Or Eyes (Jaundice)] : denies jaundice [Abdominal Pain] : denies abdominal pain [Abdominal Swelling] : denies abdominal swelling [Rectal Pain] : denies rectal pain [Wt Gain ___ Lbs] : recent [unfilled] ~Upound(s) weight gain [Wt Loss ___ Lbs] : no recent weight loss [GERD] : gastroesophageal reflux disease [Hiatus Hernia] : hiatus hernia [Peptic Ulcer Disease] : peptic ulcer disease [Pancreatitis] : no pancreatitis [Cholelithiasis] : no cholelithiasis [Inflammatory Bowel Disease] : no inflammatory bowel disease [Kidney Stone] : no kidney stone [Irritable Bowel Syndrome] : no irritable bowel syndrome [Alcohol Abuse] : no alcohol abuse [Malignancy] : no malignancy [Abdominal Surgery] : no abdominal surgery [Cholecystectomy] : no cholecystectomy [Appendectomy] : no appendectomy [Good Compliance] : good compliance with treatment [de-identified] : Pt states in am he has mild discomfort epigastric area but it goes away on its own.  No dysphagia but has reflux occasionally when he something he is not suppose to..  he is not taking his lactaid.  No nausea or vomiting or dysphagia.  No weight loss  he doesn’t wake up with reflux.he doesn’t have cough or sore throat, or bleeding gums.  No black stools.  he states last week after a forced bm he saw blood on toilet paper.  he doesn’t have rectal pain or itching.  He has constipation intermittently . he drinks two glasses of water a day.  He eats fruit and vegetables.  He has hemorrhoids .

## 2019-08-20 NOTE — END OF VISIT
[FreeTextEntry3] : residents were present for entire exam with me.   [>50% of Time Spent on Counseling for ____] : Greater than 50% of the encounter time was spent on counseling for [unfilled] [Time Spent: ___ minutes] : I have spent [unfilled] minutes of face to face time with the patient

## 2019-08-20 NOTE — REASON FOR VISIT
[Follow-Up: _____] : a [unfilled] follow-up visit [[employer] ] : provided by [employer]   [FreeTextEntry2] : virginie [FreeTextEntry1] : [id number]

## 2019-08-20 NOTE — ASSESSMENT
[FreeTextEntry1] : constipation - increase fiber intake to 35 grams and increase water intake to 8 glasses a day.  Hemorrhoids  medicated tucks, stool softener and sitz baths soak for 15 mins tid .  .  he has sitz bath and doughnut. 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  We discussed hemorrhoids and treatment.    Hemorrhoids are enlarged or swollen veins in the lower rectum. The most common symptoms of hemorrhoids are rectal bleeding, itching, and pain. You may be able to see or feel hemorrhoids around the outside of the anus, or they may be hidden from view, inside the rectum (figure 1A-B).\par \par Hemorrhoids are common, occurring in both men and women. Although hemorrhoids do not usually cause serious health problems, they can be annoying and uncomfortable. Fortunately, treatments for hemorrhoids are available and can usually minimize the bothersome symptoms.\par \par More detailed information about hemorrhoids is available by subscription. (See "Hemorrhoids: Clinical manifestations and diagnosis" and "Home and office treatment of symptomatic hemorrhoids".)\par \par \par HEMORRHOID SYMPTOMS\par Hemorrhoids are more common in people who are older and in those who have diarrhea, pelvic tumors, during or after pregnancy, and in people who sit for prolonged periods of time and/or strain (push hard) to have a bowel movement.\par \par Symptoms of hemorrhoids can include the following:\par \par ?Painless rectal bleeding\par \par ?Anal itching or pain\par \par ?Tissue bulging around the anus\par \par ?Leakage of feces or difficulty cleaning after a bowel movement\par \par \par Rectal bleeding — Many people with hemorrhoids notice bright red blood on the stool, in the toilet, or on the toilet tissue after a bowel movement. The amount of blood is usually small. However, even a small amount of blood in the toilet bowl can cause the water to appear bright red, which can be frightening. Less commonly, bleeding can be heavy.\par \par While hemorrhoids are one of the most common reasons for rectal bleeding, there are other, more serious causes. It is not possible to know what is causing rectal bleeding unless you are examined. If you see bleeding after a bowel movement, call your healthcare provider. (See "Patient education: Blood in the stool (rectal bleeding) in adults (Beyond the Basics)".)\par \par Itching — Hemorrhoids commonly cause itching and irritation of skin around the anus.\par \par Pain — Hemorrhoids can become painful. If you develop severe pain, call your healthcare provider immediately because this may be a sign of a serious problem.\par \par \par HEMORRHOID DIAGNOSIS\par To diagnose hemorrhoids, your clinician will examine your rectum and anus, and may insert a gloved finger into the rectum. If there is bleeding, testing should include a procedure that allows your healthcare provider to look inside the anus (called anoscopy) or colon (sigmoidoscopy or colonoscopy). (See "Patient education: Flexible sigmoidoscopy (Beyond the Basics)".)\par \par \par INITIAL HEMORRHOID TREATMENT\par There are measures you can take at home to relieve hemorrhoid symptoms (table 1). One of the most important steps in treating hemorrhoids is avoiding constipation (hard or infrequent stools). Hard stools can lead to rectal bleeding and/or a tear in the anus, called an anal fissure. In addition, pushing and straining to move your bowels can worsen existing hemorrhoids and increase the risk of developing new hemorrhoids. (See "Patient education: Anal fissure (Beyond the Basics)".)\par \par Fiber supplements — Increasing fiber in your diet is one of the best ways to soften your stools. Fiber is found in fruits and vegetables. The recommended amount of dietary fiber is 20 to 35 grams per day (table 2). (See "Patient education: High-fiber diet (Beyond the Basics)".)\par \par Several fiber supplements are available, including psyllium (sample brand names: Konsyl, Metamucil), methylcellulose (sample brand name: Citrucel), polycarbophil (sample brand name: FiberCon), and wheat dextrin (Benefiber). Start with a small amount and increase slowly to avoid side effects.\par \par Laxatives — If increasing fiber does not relieve your constipation, or if side effects of fiber are intolerable, you can try a laxative.\par \par Many people worry about taking laxatives regularly, fearing that they will not be able to have a bowel movement if the laxative is stopped. Laxatives are not "addictive" and using laxatives does not increase your risk of constipation in the future. Instead, using a laxative may actually prevent long-term problems with constipation. (See "Patient education: Constipation in adults (Beyond the Basics)".)\par \par Warm sitz baths — During a sitz bath, you soak the rectal area in warm water for 10 to 15 minutes two to three times daily. Sitz baths are available in most drugstores. It is also possible to use a bathtub and sit in 2 to 3 inches of warm water. Do not add soap, bubble bath, or other additives in the water. Sitz baths work by improving blood flow and relaxing the muscle around the anus, called the internal anal sphincter.\par \par Topical treatments — Various creams and suppositories are available to treat hemorrhoids, and many are available without a prescription. Pain-relieving creams and hydrocortisone rectal suppositories may help relieve pain, inflammation, and itching, at least temporarily.\par \par You should not use hemorrhoid creams and suppositories, particularly hydrocortisone, for longer than one week, unless your healthcare provider approves.\par \par \par MINIMALLY INVASIVE TREATMENT\par If you have bothersome hemorrhoids after using conservative measures, you may want to consider a minimally invasive procedure. Most procedures are performed as a day surgery. The following procedures are intended for treatment of internal hemorrhoids.\par \par Rubber band ligation — Rubber band ligation is the most widely used procedure. It is successful in approximately 70 to 80 percent of patients.\par \par Rubber bands or rings are placed around the base of an internal hemorrhoid. As the blood supply is restricted, the hemorrhoid shrinks and degenerates over several days. Many patients report a sense of "tightness" after the procedure, which may improve with warm sitz baths. Patients are encouraged to use fiber supplements to avoid constipation.\par \par Delayed bleeding may occur when the rubber band falls off, usually two to four days after the procedure. In some cases, a raw and sore area develops five to seven days following the procedure. Other less common complications of rubber band ligation include severe pain, thrombosis of other hemorrhoids, and localized infection or pus formation (abscess). Rubber band ligation rarely causes serious complications.\par \par Laser, infrared, or bipolar coagulation — These methods involve the use of laser or infrared light or heat to destroy internal hemorrhoids.\par \par Sclerotherapy — During sclerotherapy, a chemical solution is injected into hemorrhoidal tissue, causing the tissue to break down and form a scar. Sclerotherapy may be less effective than rubber band ligation.\par \par \par HEMORRHOID SURGERY\par If you continue to have symptoms from hemorrhoids (such as bleeding, pain, or prolapse) despite medical therapies or office-based procedures, you may require surgery.\par \par Options for surgical treatment for hemorrhoids include hemorrhoidectomy (surgically removing excess hemorrhoidal tissues), which works for both internal and external hemorrhoids, and other procedures (eg, stapled hemorrhoidopexy and hemorrhoidal arterial ligation), which only work for internal hemorrhoids. If you need surgery, your doctor can help you figure out which procedure is best for you.\par Lactose intolerance take Lactaid.  Dyspepsia   famotidine as needed.

## 2019-08-20 NOTE — PHYSICAL EXAM
[General Appearance - In No Acute Distress] : in no acute distress [General Appearance - Alert] : alert [PERRL With Normal Accommodation] : pupils were equal in size, round, and reactive to light [Oropharynx] : the oropharynx was normal [Auscultation Breath Sounds / Voice Sounds] : lungs were clear to auscultation bilaterally [Apical Impulse] : the apical impulse was normal [Heart Rate And Rhythm] : heart rate was normal and rhythm regular [Heart Sounds] : normal S1 and S2 [Murmurs] : no murmurs [Edema] : there was no peripheral edema [No Mass] : no masses were palpated [Soft, Nontender] : the abdomen was soft and nontender [No HSM] : no hepatosplenomegaly noted [Excoriation] : excoriations [Internal Hemorrhoid] : internal hemorrhoid was present [Fistula] : no fistulas [Nonprolapsing] : a nonprolapsing (grade I) [Tender, Swollen] : tender, swollen [Skin Tags] : there were no residual hemorrhoidal skin tags seen [Anterior] : present anteriorly [Normal] : was normal [2:00] : in the 2:00 position [3:00] : in the 3:00 position [Gross Blood] : no gross blood [Stool Sample Taken] : a stool sample was obtained [Cervical Lymph Nodes Enlarged Posterior Bilaterally] : posterior cervical [Cervical Lymph Nodes Enlarged Anterior Bilaterally] : anterior cervical [Supraclavicular Lymph Nodes Enlarged Bilaterally] : supraclavicular [Axillary Lymph Nodes Enlarged Bilaterally] : axillary [Femoral Lymph Nodes Enlarged Bilaterally] : femoral [Inguinal Lymph Nodes Enlarged Bilaterally] : inguinal [No CVA Tenderness] : no ~M costovertebral angle tenderness [Abnormal Walk] : normal gait [Nail Clubbing] : no clubbing  or cyanosis of the fingernails [Musculoskeletal - Swelling] : no joint swelling seen [Motor Tone] : muscle strength and tone were normal [Skin Color & Pigmentation] : normal skin color and pigmentation [] : no rash [Skin Turgor] : normal skin turgor [Deep Tendon Reflexes (DTR)] : deep tendon reflexes were 2+ and symmetric [No Focal Deficits] : no focal deficits [Sensation] : the sensory exam was normal to light touch and pinprick [Oriented To Time, Place, And Person] : oriented to person, place, and time [Impaired Insight] : insight and judgment were intact [Affect] : the affect was normal

## 2019-09-19 ENCOUNTER — APPOINTMENT (OUTPATIENT)
Dept: HEPATOLOGY | Facility: CLINIC | Age: 64
End: 2019-09-19
Payer: MEDICAID

## 2019-09-19 VITALS
DIASTOLIC BLOOD PRESSURE: 85 MMHG | BODY MASS INDEX: 30.78 KG/M2 | SYSTOLIC BLOOD PRESSURE: 160 MMHG | WEIGHT: 215 LBS | RESPIRATION RATE: 17 BRPM | HEART RATE: 66 BPM | TEMPERATURE: 98.2 F | HEIGHT: 70 IN

## 2019-09-19 PROCEDURE — 99213 OFFICE O/P EST LOW 20 MIN: CPT

## 2019-09-20 LAB
AFP-TM SERPL-MCNC: <1.8 NG/ML
ALBUMIN SERPL ELPH-MCNC: 4.6 G/DL
ALP BLD-CCNC: 74 U/L
ALT SERPL-CCNC: 24 U/L
AST SERPL-CCNC: 27 U/L
BASOPHILS # BLD AUTO: 0.09 K/UL
BASOPHILS NFR BLD AUTO: 1 %
BILIRUB DIRECT SERPL-MCNC: 0.2 MG/DL
BILIRUB INDIRECT SERPL-MCNC: 0.6 MG/DL
BILIRUB SERPL-MCNC: 0.7 MG/DL
EOSINOPHIL # BLD AUTO: 0.59 K/UL
EOSINOPHIL NFR BLD AUTO: 6.3 %
HBV DNA # SERPL NAA+PROBE: 778 IU/ML
HBV SURFACE AB SER QL: NONREACTIVE
HBV SURFACE AG SER QL: REACTIVE
HCT VFR BLD CALC: 49.8 %
HCV AB SER QL: NONREACTIVE
HCV S/CO RATIO: 0.18 S/CO
HEPB DNA PCR LOG: 2.89
HGB BLD-MCNC: 16.4 G/DL
IMM GRANULOCYTES NFR BLD AUTO: 0.3 %
LYMPHOCYTES # BLD AUTO: 3.08 K/UL
LYMPHOCYTES NFR BLD AUTO: 32.7 %
MAN DIFF?: NORMAL
MCHC RBC-ENTMCNC: 30.9 PG
MCHC RBC-ENTMCNC: 32.9 GM/DL
MCV RBC AUTO: 94 FL
MONOCYTES # BLD AUTO: 0.97 K/UL
MONOCYTES NFR BLD AUTO: 10.3 %
NEUTROPHILS # BLD AUTO: 4.65 K/UL
NEUTROPHILS NFR BLD AUTO: 49.4 %
PLATELET # BLD AUTO: 190 K/UL
PROT SERPL-MCNC: 7.6 G/DL
RBC # BLD: 5.3 M/UL
RBC # FLD: 12.3 %
WBC # FLD AUTO: 9.41 K/UL

## 2019-10-09 ENCOUNTER — FORM ENCOUNTER (OUTPATIENT)
Age: 64
End: 2019-10-09

## 2019-10-10 ENCOUNTER — APPOINTMENT (OUTPATIENT)
Dept: ULTRASOUND IMAGING | Facility: HOSPITAL | Age: 64
End: 2019-10-10
Payer: MEDICAID

## 2019-10-10 ENCOUNTER — OUTPATIENT (OUTPATIENT)
Dept: OUTPATIENT SERVICES | Facility: HOSPITAL | Age: 64
LOS: 1 days | End: 2019-10-10
Payer: MEDICAID

## 2019-10-10 DIAGNOSIS — K76.0 FATTY (CHANGE OF) LIVER, NOT ELSEWHERE CLASSIFIED: ICD-10-CM

## 2019-10-10 DIAGNOSIS — B18.1 CHRONIC VIRAL HEPATITIS B WITHOUT DELTA-AGENT: ICD-10-CM

## 2019-10-10 PROCEDURE — 76700 US EXAM ABDOM COMPLETE: CPT

## 2019-10-10 PROCEDURE — 76700 US EXAM ABDOM COMPLETE: CPT | Mod: 26

## 2019-11-20 ENCOUNTER — APPOINTMENT (OUTPATIENT)
Dept: INTERNAL MEDICINE | Facility: CLINIC | Age: 64
End: 2019-11-20
Payer: MEDICAID

## 2019-11-20 ENCOUNTER — LABORATORY RESULT (OUTPATIENT)
Age: 64
End: 2019-11-20

## 2019-11-20 VITALS
BODY MASS INDEX: 31.07 KG/M2 | HEIGHT: 70 IN | SYSTOLIC BLOOD PRESSURE: 129 MMHG | TEMPERATURE: 98.2 F | DIASTOLIC BLOOD PRESSURE: 83 MMHG | HEART RATE: 79 BPM | OXYGEN SATURATION: 98 % | WEIGHT: 217 LBS

## 2019-11-20 DIAGNOSIS — Z87.19 PERSONAL HISTORY OF OTHER DISEASES OF THE DIGESTIVE SYSTEM: ICD-10-CM

## 2019-11-20 LAB — EOSINOPHIL # BLD MANUAL: 440 /UL

## 2019-11-20 PROCEDURE — 99214 OFFICE O/P EST MOD 30 MIN: CPT

## 2019-11-20 RX ORDER — HYDROCORTISONE ACETATE 30 MG/1
30 SUPPOSITORY RECTAL
Qty: 20 | Refills: 1 | Status: COMPLETED | COMMUNITY
Start: 2019-08-20 | End: 2019-11-20

## 2019-11-20 RX ORDER — WITCH HAZEL 50 %
50 PADS, MEDICATED (EA) TOPICAL
Qty: 1 | Refills: 0 | Status: COMPLETED | COMMUNITY
Start: 2019-08-20 | End: 2019-11-20

## 2019-11-20 NOTE — ASSESSMENT
[FreeTextEntry1] : eosinophilia will retest for allergies and do strongyloides testing and stool for ova and parasites.    Chronic Hep B fu with  hepatologist  surveillance with  viral load and sonogram.   he also has fatty liver fibrosis F1 he is to continue weight loss avoid alcohol  and exercise regularly.  constipation resolved GERD improved.   he understands risks of liver disease.  Alcohol use -  he is drinking beer  now two beers  every two weeks.

## 2019-11-20 NOTE — END OF VISIT
[>50% of Time Spent on Counseling for ____] : Greater than 50% of the encounter time was spent on counseling for [unfilled] [FreeTextEntry3] : resident was present for exam

## 2019-11-20 NOTE — REASON FOR VISIT
[Follow-Up: _____] : a [unfilled] follow-up visit [[employer] ] : provided by [employer]   [FreeTextEntry1] : [id number]  [FreeTextEntry2] : Magaly [TWNoteComboBox1] : Lao

## 2019-11-20 NOTE — HISTORY OF PRESENT ILLNESS
[Heartburn] : improved heartburn [Nausea] : denies nausea [Vomiting] : denies vomiting [Diarrhea] : denies diarrhea [Constipation] : resolved constipation [Yellow Skin Or Eyes (Jaundice)] : denies jaundice [Abdominal Pain] : resolved abdominal pain [Abdominal Swelling] : abdominal swelling resolved [Rectal Pain] : resolved rectal pain [GERD] : gastroesophageal reflux disease [Hiatus Hernia] : hiatus hernia [de-identified] : pt has seen hepatologist and i appreciate her note. he was again advised to decrease his alcohol consumption.  he has chronic hep B and will not need treatment presently since he has a low viral load and is hepE ab positive.  He will need surveillance for hepatocellular cancer and for viral copies .   he will need yearly us of abd and fu with hepatologist.  Constipation-  he is doing well  and no longer has rectal discomfort.  . he has no dysphagia any longer.   He soemtimes at night he has mild heart burn.

## 2019-11-20 NOTE — PHYSICAL EXAM
[General Appearance - Alert] : alert [PERRL With Normal Accommodation] : pupils were equal in size, round, and reactive to light [General Appearance - In No Acute Distress] : in no acute distress [Oropharynx] : the oropharynx was normal [Auscultation Breath Sounds / Voice Sounds] : lungs were clear to auscultation bilaterally [Heart Rate And Rhythm] : heart rate was normal and rhythm regular [Apical Impulse] : the apical impulse was normal [Heart Sounds] : normal S1 and S2 [Edema] : there was no peripheral edema [Abdomen Soft] : soft [Bowel Sounds] : normal bowel sounds [Abdomen Tenderness] : non-tender [Abdomen Mass (___ Cm)] : no abdominal mass palpated [Cervical Lymph Nodes Enlarged Posterior Bilaterally] : posterior cervical [Abnormal Walk] : normal gait [Cervical Lymph Nodes Enlarged Anterior Bilaterally] : anterior cervical [No CVA Tenderness] : no ~M costovertebral angle tenderness [Nail Clubbing] : no clubbing  or cyanosis of the fingernails [Musculoskeletal - Swelling] : no joint swelling seen [Motor Tone] : muscle strength and tone were normal [Skin Color & Pigmentation] : normal skin color and pigmentation [Skin Turgor] : normal skin turgor [Deep Tendon Reflexes (DTR)] : deep tendon reflexes were 2+ and symmetric [] : no rash [Oriented To Time, Place, And Person] : oriented to person, place, and time [No Focal Deficits] : no focal deficits [Sensation] : the sensory exam was normal to light touch and pinprick [Affect] : the affect was normal [Impaired Insight] : insight and judgment were intact

## 2019-11-26 LAB
DEPRECATED O AND P PREP STL: NORMAL
STRONGYLOIDES AB SER IA-ACNC: NEGATIVE

## 2019-12-01 LAB
A ALTERNATA IGE QN: <0.1 KUA/L
A FUMIGATUS IGE QN: <0.1 KUA/L
BARLEY IGE QN: 0.14 KUA/L
BERMUDA GRASS IGE QN: 6.47 KUA/L
BOXELDER IGE QN: <0.1 KUA/L
C HERBARUM IGE QN: <0.1 KUA/L
CALIF WALNUT IGE QN: NORMAL
CAT DANDER IGE QN: <0.1 KUA/L
CHERRY IGE QN: NORMAL
CMN PIGWEED IGE QN: <0.1 KUA/L
COMMON RAGWEED IGE QN: 0.11 KUA/L
COTTONWOOD IGE QN: 0.11 KUA/L
COW MILK IGE QN: <0.1 KUA/L
CRAB IGE QN: <0.1 KUA/L
D FARINAE IGE QN: 0.1 KUA/L
D PTERONYSS IGE QN: <0.1 KUA/L
DEPRECATED A ALTERNATA IGE RAST QL: 0
DEPRECATED A FUMIGATUS IGE RAST QL: 0
DEPRECATED BARLEY IGE RAST QL: NORMAL
DEPRECATED BERMUDA GRASS IGE RAST QL: 3
DEPRECATED BOXELDER IGE RAST QL: 0
DEPRECATED C HERBARUM IGE RAST QL: 0
DEPRECATED CAT DANDER IGE RAST QL: 0
DEPRECATED CHERRY IGE RAST QL: NORMAL
DEPRECATED COMMON PIGWEED IGE RAST QL: 0
DEPRECATED COMMON RAGWEED IGE RAST QL: NORMAL
DEPRECATED COTTONWOOD IGE RAST QL: NORMAL
DEPRECATED COW MILK IGE RAST QL: 0
DEPRECATED CRAB IGE RAST QL: 0
DEPRECATED D FARINAE IGE RAST QL: NORMAL
DEPRECATED D PTERONYSS IGE RAST QL: 0
DEPRECATED DOG DANDER IGE RAST QL: 0
DEPRECATED EGG WHITE IGE RAST QL: 0
DEPRECATED GOOSEFOOT IGE RAST QL: NORMAL
DEPRECATED LONDON PLANE IGE RAST QL: NORMAL
DEPRECATED MUGWORT IGE RAST QL: 0
DEPRECATED OAT IGE RAST QL: 0
DEPRECATED P NOTATUM IGE RAST QL: 0
DEPRECATED PEANUT IGE RAST QL: NORMAL
DEPRECATED RED CEDAR IGE RAST QL: NORMAL
DEPRECATED ROACH IGE RAST QL: NORMAL
DEPRECATED RYE IGE RAST QL: NORMAL
DEPRECATED SHEEP SORREL IGE RAST QL: NORMAL
DEPRECATED SILVER BIRCH IGE RAST QL: 0
DEPRECATED SOYBEAN IGE RAST QL: 0
DEPRECATED TIMOTHY IGE RAST QL: 3
DEPRECATED WHEAT IGE RAST QL: 1
DEPRECATED WHITE ASH IGE RAST QL: 0
DEPRECATED WHITE OAK IGE RAST QL: NORMAL
DOG DANDER IGE QN: <0.1 KUA/L
EGG WHITE IGE QN: <0.1 KUA/L
GOOSEFOOT IGE QN: 0.14 KUA/L
LONDON PLANE IGE QN: NORMAL
MUGWORT IGE QN: <0.1 KUA/L
MULBERRY (T70) CLASS: NORMAL
MULBERRY (T70) CONC: NORMAL
OAT IGE QN: <0.1 KUA/L
P NOTATUM IGE QN: <0.1 KUA/L
PEANUT IGE QN: 0.13 KUA/L
RED CEDAR IGE QN: NORMAL
ROACH IGE QN: 0.16 KUA/L
RYE IGE QN: 0.15 KUA/L
SHEEP SORREL IGE QN: NORMAL
SILVER BIRCH IGE QN: <0.1 KUA/L
SOYBEAN IGE QN: <0.1 KUA/L
TIMOTHY IGE QN: 9.09 KUA/L
TOTAL IGE SMQN RAST: 115 KU/L
TREE ALLERG MIX1 IGE QL: NORMAL
WHEAT IGE QN: 0.38 KUA/L
WHITE ASH IGE QN: <0.1 KUA/L
WHITE ELM IGE QN: 0.12 KUA/L
WHITE ELM IGE QN: NORMAL
WHITE OAK IGE QN: 0.12 KUA/L

## 2019-12-10 ENCOUNTER — RX RENEWAL (OUTPATIENT)
Age: 64
End: 2019-12-10

## 2020-01-14 ENCOUNTER — RX RENEWAL (OUTPATIENT)
Age: 65
End: 2020-01-14

## 2020-02-13 ENCOUNTER — APPOINTMENT (OUTPATIENT)
Dept: INTERNAL MEDICINE | Facility: CLINIC | Age: 65
End: 2020-02-13
Payer: MEDICAID

## 2020-02-13 VITALS
SYSTOLIC BLOOD PRESSURE: 163 MMHG | HEART RATE: 62 BPM | HEIGHT: 70 IN | OXYGEN SATURATION: 96 % | DIASTOLIC BLOOD PRESSURE: 97 MMHG | BODY MASS INDEX: 31.21 KG/M2 | TEMPERATURE: 97.7 F | RESPIRATION RATE: 17 BRPM | WEIGHT: 218 LBS

## 2020-02-13 DIAGNOSIS — Z00.00 ENCOUNTER FOR GENERAL ADULT MEDICAL EXAMINATION W/OUT ABNORMAL FINDINGS: ICD-10-CM

## 2020-02-13 PROCEDURE — 99214 OFFICE O/P EST MOD 30 MIN: CPT

## 2020-02-13 NOTE — HISTORY OF PRESENT ILLNESS
[[employer] ] : provided by [employer]   [FreeTextEntry1] : [id number] [FreeTextEntry2] : Pablo [TWNoteComboBox1] : [place] [de-identified] : 64 year old  male patient with history of stable Essential Hypertension, Hypercholesterolemia, Chronic Hepatitis B, Vitamin D Deficiency, NAFLD,with , history as stated, presented for follow up examination. Patient is compliant with all medications. Denies shortness of breath, chest pain or abdominal pains at this time. ROS as stated.\par

## 2020-02-13 NOTE — ASSESSMENT
[FreeTextEntry1] : 64 year old male found to have stable Essential Hypertension, Hypercholesterolemia, Chronic Hepatitis B, Vitamin D Deficiency, NAFLD,with the current regimen, diet and life style modifications, as counseled. Prior results reviewed and discussed with the patient during today's examination. Plan as ordered.\par Patient was recently evaluated by HEP/GI , findings and recommendations reviewed with the patient during today's examination.\par

## 2020-02-13 NOTE — HEALTH RISK ASSESSMENT
[No] : In the past 12 months have you used drugs other than those required for medical reasons? No [No falls in past year] : Patient reported no falls in the past year [0] : 2) Feeling down, depressed, or hopeless: Not at all (0) [] : No [de-identified] : HEP/GI [[id number]] : 0

## 2020-02-14 LAB
25(OH)D3 SERPL-MCNC: 33.8 NG/ML
ALBUMIN SERPL ELPH-MCNC: 5 G/DL
ALP BLD-CCNC: 76 U/L
ALT SERPL-CCNC: 24 U/L
ANION GAP SERPL CALC-SCNC: 14 MMOL/L
APPEARANCE: CLEAR
AST SERPL-CCNC: 25 U/L
BASOPHILS # BLD AUTO: 0.09 K/UL
BASOPHILS NFR BLD AUTO: 0.9 %
BILIRUB SERPL-MCNC: 1.2 MG/DL
BILIRUBIN URINE: NEGATIVE
BLOOD URINE: NEGATIVE
BUN SERPL-MCNC: 11 MG/DL
CALCIUM SERPL-MCNC: 10 MG/DL
CHLORIDE SERPL-SCNC: 104 MMOL/L
CHOLEST SERPL-MCNC: 232 MG/DL
CHOLEST/HDLC SERPL: 4.9 RATIO
CO2 SERPL-SCNC: 26 MMOL/L
COLOR: NORMAL
CREAT SERPL-MCNC: 0.98 MG/DL
CREAT SPEC-SCNC: 92 MG/DL
EOSINOPHIL # BLD AUTO: 0.46 K/UL
EOSINOPHIL NFR BLD AUTO: 4.8 %
ERYTHROCYTE [SEDIMENTATION RATE] IN BLOOD BY WESTERGREN METHOD: 8 MM/HR
ESTIMATED AVERAGE GLUCOSE: 105 MG/DL
FOLATE SERPL-MCNC: 19.2 NG/ML
GGT SERPL-CCNC: 32 U/L
GLUCOSE QUALITATIVE U: NEGATIVE
GLUCOSE SERPL-MCNC: 84 MG/DL
HBA1C MFR BLD HPLC: 5.3 %
HCT VFR BLD CALC: 49.6 %
HDLC SERPL-MCNC: 47 MG/DL
HGB BLD-MCNC: 16.3 G/DL
IMM GRANULOCYTES NFR BLD AUTO: 0.2 %
IRON SATN MFR SERPL: 37 %
IRON SERPL-MCNC: 124 UG/DL
KETONES URINE: NEGATIVE
LDLC SERPL CALC-MCNC: 167 MG/DL
LEUKOCYTE ESTERASE URINE: NEGATIVE
LYMPHOCYTES # BLD AUTO: 3.28 K/UL
LYMPHOCYTES NFR BLD AUTO: 34.2 %
MAN DIFF?: NORMAL
MCHC RBC-ENTMCNC: 31.2 PG
MCHC RBC-ENTMCNC: 32.9 GM/DL
MCV RBC AUTO: 95 FL
MICROALBUMIN 24H UR DL<=1MG/L-MCNC: <1.2 MG/DL
MICROALBUMIN/CREAT 24H UR-RTO: NORMAL MG/G
MONOCYTES # BLD AUTO: 0.76 K/UL
MONOCYTES NFR BLD AUTO: 7.9 %
NEUTROPHILS # BLD AUTO: 4.98 K/UL
NEUTROPHILS NFR BLD AUTO: 52 %
NITRITE URINE: NEGATIVE
PH URINE: 6
PLATELET # BLD AUTO: 194 K/UL
POTASSIUM SERPL-SCNC: 5.1 MMOL/L
PROT SERPL-MCNC: 7.5 G/DL
PROTEIN URINE: NEGATIVE
PSA FREE FLD-MCNC: 27 %
PSA FREE SERPL-MCNC: 0.23 NG/ML
PSA SERPL-MCNC: 0.87 NG/ML
RBC # BLD: 5.22 M/UL
RBC # FLD: 12.3 %
SODIUM SERPL-SCNC: 144 MMOL/L
SPECIFIC GRAVITY URINE: 1.01
T3 SERPL-MCNC: 129 NG/DL
T4 FREE SERPL-MCNC: 1.1 NG/DL
TIBC SERPL-MCNC: 333 UG/DL
TRIGL SERPL-MCNC: 91 MG/DL
TSH SERPL-ACNC: 2.07 UIU/ML
UIBC SERPL-MCNC: 209 UG/DL
UROBILINOGEN URINE: NORMAL
VIT B12 SERPL-MCNC: 503 PG/ML
WBC # FLD AUTO: 9.59 K/UL

## 2020-02-26 ENCOUNTER — APPOINTMENT (OUTPATIENT)
Dept: INTERNAL MEDICINE | Facility: CLINIC | Age: 65
End: 2020-02-26
Payer: MEDICAID

## 2020-02-26 VITALS
BODY MASS INDEX: 31.64 KG/M2 | WEIGHT: 221 LBS | TEMPERATURE: 97.9 F | DIASTOLIC BLOOD PRESSURE: 103 MMHG | HEART RATE: 96 BPM | OXYGEN SATURATION: 96 % | SYSTOLIC BLOOD PRESSURE: 154 MMHG | HEIGHT: 70 IN

## 2020-02-26 LAB — HEMOCCULT STL QL IA: NEGATIVE

## 2020-02-26 PROCEDURE — 99212 OFFICE O/P EST SF 10 MIN: CPT

## 2020-02-26 PROCEDURE — 82270 OCCULT BLOOD FECES: CPT

## 2020-02-26 NOTE — PHYSICAL EXAM
[General Appearance - Alert] : alert [General Appearance - In No Acute Distress] : in no acute distress [PERRL With Normal Accommodation] : pupils were equal in size, round, and reactive to light [Oropharynx] : the oropharynx was normal [Auscultation Breath Sounds / Voice Sounds] : lungs were clear to auscultation bilaterally [Apical Impulse] : the apical impulse was normal [Heart Rate And Rhythm] : heart rate was normal and rhythm regular [Edema] : there was no peripheral edema [Normal] : normal [Soft, Nontender] : the abdomen was soft and nontender [No Mass] : no masses were palpated [No HSM] : no hepatosplenomegaly noted [Excoriation] : excoriations [Internal Hemorrhoid] : internal hemorrhoid was present [External Hemorrhoid] : no external hemorrhoids were present [Tender, Swollen] : that was nontender and non-swollen [Skin Tags] : residual hemorrhoidal skin tags were noted [Occult Blood Positive] : was negative for occult blood [Gross Blood] : no gross blood [Stool Sample Taken] : a stool sample was obtained [Fecal Impaction] : no fecal impaction was present [Cervical Lymph Nodes Enlarged Posterior Bilaterally] : posterior cervical [Cervical Lymph Nodes Enlarged Anterior Bilaterally] : anterior cervical [No CVA Tenderness] : no ~M costovertebral angle tenderness [Abnormal Walk] : normal gait [Nail Clubbing] : no clubbing  or cyanosis of the fingernails [Musculoskeletal - Swelling] : no joint swelling seen [Motor Tone] : muscle strength and tone were normal [Skin Color & Pigmentation] : normal skin color and pigmentation [Skin Turgor] : normal skin turgor [] : no rash [Deep Tendon Reflexes (DTR)] : deep tendon reflexes were 2+ and symmetric [Sensation] : the sensory exam was normal to light touch and pinprick [No Focal Deficits] : no focal deficits [Oriented To Time, Place, And Person] : oriented to person, place, and time [Impaired Insight] : insight and judgment were intact [Affect] : the affect was normal

## 2020-02-26 NOTE — ASSESSMENT
[FreeTextEntry1] : internal hemorrhoids  we discussed high fiber diet an dto increase his water intake to 8 glasses a day .he only drinks 3 glasses a day.  He will use suppositories and external cream and if after three months he continues to have rectal bleeding repeat colonoscopy will be done.  He is due in 2021 but with recurrent bleeding I will do it sooner.  I also asked him to stoop drinking alcohol and lose weight  Weight loss, exercise, and diet control were discussed and are highly encouraged. Treatment options were given such as, aqua therapy, and contacting a nutritionist. Recommended to use the elliptical, stationary bike, less use of treadmill. Mindful eating was explained to the patient Obesity is associated with worsening asthma, shortness of breath, and potential for cardiac disease, diabetes, and other underlying medical conditions.\par fatty liver he understands risks of drinking and obesity and need for lifestyle changes.    \par constipation - Dietary fiber — found mainly in fruits, vegetables, whole grains and legumes — is probably best known for its ability to prevent or relieve constipation. But foods containing fiber can provide other health benefits as well, such as helping to maintain a healthy weight and lowering your risk of diabetes and heart disease.\par \par Selecting tasty foods that provide fiber isn't difficult. Find out how much dietary fiber you need, the foods that contain it, and how to add them to meals and snacks.\par \par Dietary fiber, also known as roughage or bulk, includes the parts of plant foods your body can't digest or absorb. Unlike other food components, such as fats, proteins or carbohydrates — which your body breaks down and absorbs — fiber isn't digested by your body. Instead, it passes relatively intact through your stomach, small intestine and colon and out of your body.\par \par Fiber is commonly classified as soluble, which dissolves in water, or insoluble, which doesn't dissolve.\par •Soluble fiber. This type of fiber dissolves in water to form a gel-like material. It can help lower blood cholesterol and glucose levels. Soluble fiber is found in oats, peas, beans, apples, citrus fruits, carrots, barley and psyllium.\par •Insoluble fiber. This type of fiber promotes the movement of material through your digestive system and increases stool bulk, so it can be of benefit to those who struggle with constipation or irregular stools. Whole-wheat flour, wheat bran, nuts, beans and vegetables, such as cauliflower, green beans and potatoes, are good sources of insoluble fiber.\par \par Most plant-based foods, such as oatmeal and beans, contain both soluble and insoluble fiber. However, the amount of each type varies in different plant foods. To receive the greatest health benefit, eat a wide variety of high-fiber foods.\par \par A high-fiber diet has many benefits, which include:\par •Normalizes bowel movements. Dietary fiber increases the weight and size of your stool and softens it. A bulky stool is easier to pass, decreasing your chance of constipation. If you have loose, watery stools, fiber may help to solidify the stool because it absorbs water and adds bulk to stool.\par •Helps maintain bowel health. A high-fiber diet may lower your risk of developing hemorrhoids and small pouches in your colon (diverticular disease). Some fiber is fermented in the colon. Researchers are looking at how this may play a role in preventing diseases of the colon.\par •Lowers cholesterol levels. Soluble fiber found in beans, oats, flaxseed and oat bran may help lower total blood cholesterol levels by lowering low-density lipoprotein, or "bad," cholesterol levels. Studies also have shown that high-fiber foods may have other heart-health benefits, such as reducing blood pressure and inflammation.\par •Helps control blood sugar levels. In people with diabetes, fiber — particularly soluble fiber — can slow the absorption of sugar and help improve blood sugar levels. A healthy diet that includes insoluble fiber may also reduce the risk of developing type 2 diabetes.\par •Aids in achieving healthy weight. High-fiber foods tend to be more filling than low-fiber foods, so you're likely to eat less and stay satisfied longer. And high-fiber foods tend to take longer to eat and to be less "energy dense," which means they have fewer calories for the same volume of food.\par \par Another benefit attributed to dietary fiber is prevention of colorectal cancer. However, the evidence that fiber reduces colorectal cancer is mixed.\par \par The [employer], which provides science-based advice on matters of medicine and health, gives the following daily fiber recommendations for adults:\par \par \par \par Age 50 or younger\par \par Age 51 or older\par \par \par [employer] \par \par Men 38 grams 30 grams \par Women 25 grams 21 grams \par \par If you aren't getting enough fiber each day, you may need to boost your intake. Good choices include:\par •Whole-grain products\par •Fruits\par •Vegetables\par •Beans, peas and other legumes\par •Nuts and seeds\par \par Refined or processed foods — such as canned fruits and vegetables, pulp-free juices, white breads and pastas, and non-whole-grain cereals — are lower in fiber. The grain-refining process removes the outer coat (bran) from the grain, which lowers its fiber content. Enriched foods have some of the B vitamins and iron back after processing, but not the fiber.\par \par Whole foods rather than fiber supplements are generally better. Fiber supplements — such as Metamucil, Citrucel and FiberCon — don't provide the variety of fibers, vitamins, minerals and other beneficial nutrients that foods do.\par \par Another way to get more fiber is to eat foods, such as cereal, granola bars, yogurt, and ice cream, with fiber added. The added fiber usually is labeled as "inulin" or "chicory root." Some people complain of gassiness after eating foods with added fiber.\par \par However, some people may still need a fiber supplement if dietary changes aren't sufficient or if they have certain medical conditions, such as constipation, diarrhea or irritable bowel syndrome. Check with your doctor before taking fiber supplements.\par \par Need ideas for adding more fiber to your meals and snacks? Try these suggestions:\par •Jump-start your day. For breakfast choose a high-fiber breakfast cereal — 5 or more grams of fiber a serving. Opt for cereals with "whole grain," "bran" or "fiber" in the name. Or add a few tablespoons of unprocessed wheat bran to your favorite cereal.\par •Switch to whole grains. Consume at least half of all grains as whole grains. Look for breads that list whole wheat, whole-wheat flour or another whole grain as the first ingredient on the label and have least 2 grams of dietary fiber a serving. [place] with brown rice, wild rice, barley, whole-wheat pasta and bulgur wheat.\par •Bulk up baked goods. Substitute whole-grain flour for half or all of the white flour when baking. Try adding crushed bran cereal, unprocessed wheat bran or uncooked oatmeal to muffins, cakes and cookies.\par •Lean on legumes. Beans, peas and lentils are excellent sources of fiber. Add kidney beans to canned soup or a green salad. Or make nachos with refried black beans, lots of fresh veggies, whole-wheat tortilla chips and salsa.\par •Eat more fruit and vegetables. Fruits and vegetables are rich in fiber, as well as vitamins and minerals. Try to eat five or more servings daily.\par •Make snacks count. Fresh fruits, raw vegetables, low-fat popcorn and whole-grain crackers are all good choices. An occasional handful of nuts or dried fruits also is a healthy, high-fiber snack — although be aware that nuts and dried fruits are high in calories.\par \par High-fiber foods are good for your health. But adding too much fiber too quickly can promote intestinal gas, abdominal bloating and cramping. Increase fiber in your diet gradually over a period of a few weeks. This allows the natural bacteria in your digestive system to adjust to the change.\par \par Also, drink plenty of water. Fiber works best when it absorbs water, making your stool soft and bulky\par

## 2020-02-26 NOTE — REASON FOR VISIT
[Follow-Up: _____] : a [unfilled] follow-up visit [[employer] ] : provided by [employer]   [FreeTextEntry1] : [id number] [FreeTextEntry2] : berry [TWNoteComboBox1] : [place]

## 2020-02-26 NOTE — HISTORY OF PRESENT ILLNESS
[Heartburn] : stable heartburn [Nausea] : denies nausea [Vomiting] : denies vomiting [Diarrhea] : denies diarrhea [Constipation] : improvement in constipation [Yellow Skin Or Eyes (Jaundice)] : denies jaundice [Abdominal Pain] : stable abdominal pain [Abdominal Swelling] : denies abdominal swelling [Rectal Pain] : denies rectal pain [Wt Gain ___ Lbs] : recent [unfilled] ~Upound(s) weight gain [GERD] : gastroesophageal reflux disease [Peptic Ulcer Disease] : peptic ulcer disease [Good Compliance] : good compliance with treatment [de-identified] : Pt states his constipation has improved.  He states when strains to have a forced bm he has seen blood on toilet tissue and saw it yesterday.  He had colonoscopy in 2016 and had polyps  .  he had also seen it three days ago as well. He has burning in his rectum and uses the cream.   he doesn’t have spontaneous bleeding  or staining on his undergarments.  he is not anemic .  he sometimes has pain on his right side and goes from his back to his abd.  he states he has decreased his drinking and drinks 3 beers 3-4 days a week.  He understands the importance of alcohol abstinence  and lifestyle modifications .  He alos states his hemorrhoids soemtimes come out and he pushes them back inside.

## 2020-03-19 ENCOUNTER — APPOINTMENT (OUTPATIENT)
Dept: HEPATOLOGY | Facility: CLINIC | Age: 65
End: 2020-03-19

## 2020-05-28 ENCOUNTER — APPOINTMENT (OUTPATIENT)
Dept: INTERNAL MEDICINE | Facility: CLINIC | Age: 65
End: 2020-05-28
Payer: MEDICAID

## 2020-05-28 VITALS
SYSTOLIC BLOOD PRESSURE: 149 MMHG | HEIGHT: 61 IN | OXYGEN SATURATION: 97 % | WEIGHT: 224 LBS | TEMPERATURE: 97.3 F | HEART RATE: 67 BPM | BODY MASS INDEX: 42.29 KG/M2 | DIASTOLIC BLOOD PRESSURE: 86 MMHG

## 2020-05-28 DIAGNOSIS — K62.89 OTHER SPECIFIED DISEASES OF ANUS AND RECTUM: ICD-10-CM

## 2020-05-28 PROCEDURE — 99214 OFFICE O/P EST MOD 30 MIN: CPT

## 2020-05-28 RX ORDER — HYDROCORTISONE ACETATE 30 MG/1
30 SUPPOSITORY RECTAL
Qty: 20 | Refills: 1 | Status: COMPLETED | COMMUNITY
Start: 2020-02-26 | End: 2020-05-28

## 2020-05-28 RX ORDER — HYDROCORTISONE 2.5% 25 MG/G
2.5 CREAM TOPICAL
Qty: 1 | Refills: 1 | Status: COMPLETED | COMMUNITY
Start: 2020-02-26 | End: 2020-05-28

## 2020-05-28 NOTE — REASON FOR VISIT
[Follow-Up: _____] : a [unfilled] follow-up visit [FreeTextEntry2] : rolanda [[employer] ] : provided by [employer]   [FreeTextEntry1] : [id number] [TWNoteComboBox1] : [place]

## 2020-05-28 NOTE — PHYSICAL EXAM
[General Appearance - Alert] : alert [General Appearance - In No Acute Distress] : in no acute distress [Oropharynx] : the oropharynx was normal [PERRL With Normal Accommodation] : pupils were equal in size, round, and reactive to light [Auscultation Breath Sounds / Voice Sounds] : lungs were clear to auscultation bilaterally [Heart Sounds] : normal S1 and S2 [Heart Rate And Rhythm] : heart rate was normal and rhythm regular [Heart Sounds Gallop] : no gallops [Heart Sounds Pericardial Friction Rub] : no pericardial rub [Edema] : there was no peripheral edema [Normal] : normal [Soft, Nontender] : the abdomen was soft and nontender [No HSM] : no hepatosplenomegaly noted [No Mass] : no masses were palpated [None] : no hernias were palpable [Cervical Lymph Nodes Enlarged Posterior Bilaterally] : posterior cervical [No CVA Tenderness] : no ~M costovertebral angle tenderness [Cervical Lymph Nodes Enlarged Anterior Bilaterally] : anterior cervical [Abnormal Walk] : normal gait [Nail Clubbing] : no clubbing  or cyanosis of the fingernails [Motor Tone] : muscle strength and tone were normal [Musculoskeletal - Swelling] : no joint swelling seen [Skin Color & Pigmentation] : normal skin color and pigmentation [Skin Turgor] : normal skin turgor [Motor Exam] : the motor exam was normal [] : no rash [Oriented To Time, Place, And Person] : oriented to person, place, and time [Impaired Insight] : insight and judgment were intact [Affect] : the affect was normal

## 2020-05-28 NOTE — HISTORY OF PRESENT ILLNESS
[Heartburn] : denies heartburn [Vomiting] : denies vomiting [Nausea] : denies nausea [Diarrhea] : denies diarrhea [Constipation] : denies constipation [Yellow Skin Or Eyes (Jaundice)] : denies jaundice [Abdominal Pain] : denies abdominal pain [Abdominal Swelling] : denies abdominal swelling [Rectal Pain] : denies rectal pain [Wt Loss ___ Lbs] : no recent weight loss [Wt Gain ___ Lbs] : recent [unfilled] ~Upound(s) weight gain [Hiatus Hernia] : hiatus hernia [GERD] : gastroesophageal reflux disease [de-identified] : pt had rectal discomfort and constipation . he is feeling better.  he is having normal bm  .he doesn’t have burning in his rectum.  No rectal bleeding   he states in am he sometimes has epigastric discomfort.  he is taking famotidine .  He states the pharmacy states its not available.  he is eating well and no black stools or cough and no reflux of acid into his mouth.

## 2020-05-28 NOTE — ASSESSMENT
[FreeTextEntry1] : dyspepsia \par discussed Rule of 2's; pt should avoid eating too much; too fast; too spicy; too lousy; less than two hours before bed \par -Things to avoid including overeating, spicy foods, tight clothing, eating within three hours of bed, this list is not all inclusive. \par -For treatment of reflux, possible options discussed including diet control, H2 blockers, PPIs, as well as coating motility agents discussed as treatment options. Timing of meals and proximity of last meal to sleep were discussed. If symptoms persist, a formal gastrointestinal evaluation is needed. \par \par 2 A hiatal hernia occurs when the upper part of your stomach bulges through the large muscle  your abdomen and chest (diaphragm).\par \par Your diaphragm has a small opening (hiatus) through which your food tube (esophagus) passes before connecting to your stomach. In a hiatal hernia, the stomach pushes up through that opening and into your chest.\par \par \par A small hiatal hernia usually doesn't cause problems. You may never know you have one unless your doctor discovers it when checking for another condition.\par \par But a large hiatal hernia can allow food and acid to back up into your esophagus, leading to heartburn. Self-care measures or medications can usually relieve these symptoms. A very large hiatal hernia might require surgery.\par \par \par \par \par Symptoms\par \par Most small hiatal hernias cause no signs or symptoms. But larger hiatal hernias can cause:\par •Heartburn\par •Regurgitation of food or liquids into the mouth\par •Backflow of stomach acid into the esophagus (acid reflux)\par •Difficulty swallowing\par •Chest or abdominal pain\par •Shortness of breath\par •Vomiting of blood or passing of black stools, which may indicate gastrointestinal bleeding\par \par When to see a doctor\par \par See your doctor if you have any persistent signs or symptoms that worry you.\par \par Request an Appointment at [facility]\par \par Causes\par \par A hiatal hernia occurs when weakened muscle tissue allows your stomach to bulge up through your diaphragm. It's not always clear why this happens. But a hiatal hernia might be caused by:\par •Age-related changes in your diaphragm\par •Injury to the area, for example, after trauma or certain types of surgery\par •Being born with an unusually large hiatus\par •Persistent and intense pressure on the surrounding muscles, such as while coughing, vomiting, straining during a bowel movement, exercising or lifting heavy objects\par \par Risk factors\par \par Hiatal hernia is most common in people who are:\par •Age 50 or older\par •Obese\par 3. Fatty Liver: The patient denies any jaundice or pruritus. The patient denies any alcohol use. The patient denies taking large doses of nonsteroidal anti-inflammatory drugs or acetaminophen. The findings are suggestive of fatty liver. The patient and I had a long discussion regarding the risks of fatty liver progressing to cirrhosis. The patient was told of the possible increased risk of developing liver failure, cirrhosis, ascites, GI bleeding secondary to varices, hepatic encephalopathy, bleeding tendencies and liver cancer. The patient was told of the importance of follow-up. The patient was advised to follow up every 6 months for blood work and imaging studies. The patient agreed and will follow up. The patient was advised to lose weight. I recommend a trial of vitamin E supplementation for the fatty liver. If the liver enzymes remain elevated, the patient may require a trial of Pioglitazone for the fatty liver. I recommend avoid alcohol and hepato-toxic agents. The patient was also advised to avoid NSAIDs, Acetaminophen and any other hepatotoxic drugs. The patient was also advised not to share needles, razors, scissors, nail clippers, etc.. The patient is to continue close follow-up in our office for blood work and exams. If the liver enzymes remain elevated, the patient may require a CT guided liver biopsy to assess the liver parenchyma and for possible treatment. We had a long discussion regarding the risks and benefits of the procedure. The patient was told of the risks of bleeding, perforation, infections, emergency surgery and missing lesions. The patient agreed and will follow-up to reassess the symptoms.\par \par 4. anal irritation resolved  constipation resolved

## 2020-06-25 ENCOUNTER — APPOINTMENT (OUTPATIENT)
Dept: HEPATOLOGY | Facility: CLINIC | Age: 65
End: 2020-06-25

## 2020-06-25 ENCOUNTER — APPOINTMENT (OUTPATIENT)
Dept: HEPATOLOGY | Facility: CLINIC | Age: 65
End: 2020-06-25
Payer: MEDICAID

## 2020-06-25 PROCEDURE — 99443: CPT

## 2020-07-09 ENCOUNTER — APPOINTMENT (OUTPATIENT)
Dept: CT IMAGING | Facility: HOSPITAL | Age: 65
End: 2020-07-09
Payer: MEDICAID

## 2020-07-09 ENCOUNTER — RESULT REVIEW (OUTPATIENT)
Age: 65
End: 2020-07-09

## 2020-07-09 ENCOUNTER — APPOINTMENT (OUTPATIENT)
Dept: PULMONOLOGY | Facility: HOSPITAL | Age: 65
End: 2020-07-09
Payer: MEDICAID

## 2020-07-09 ENCOUNTER — OUTPATIENT (OUTPATIENT)
Dept: OUTPATIENT SERVICES | Facility: HOSPITAL | Age: 65
LOS: 1 days | End: 2020-07-09
Payer: MEDICAID

## 2020-07-09 VITALS — HEIGHT: 61 IN | BODY MASS INDEX: 42.29 KG/M2 | WEIGHT: 224 LBS

## 2020-07-09 VITALS — BODY MASS INDEX: 32.14 KG/M2 | HEIGHT: 70 IN

## 2020-07-09 DIAGNOSIS — Z12.2 ENCOUNTER FOR SCREENING FOR MALIGNANT NEOPLASM OF RESPIRATORY ORGANS: ICD-10-CM

## 2020-07-09 PROCEDURE — G0297: CPT | Mod: 26

## 2020-07-09 PROCEDURE — G0296 VISIT TO DETERM LDCT ELIG: CPT

## 2020-07-09 PROCEDURE — G0297: CPT

## 2020-07-09 NOTE — PLAN
[Smoking Cessation] : smoking cessation [Age appropriate screenings] : Age appropriate screenings [Regular Exercise] : regular exercise [Regular follow-up with healthcare provider] : regular follow-up with healthcare provider [FreeTextEntry1] : His LDCT is scheduled for today, 7/9/20 at the [facility] location. Report was reviewed with patient and copy of report provided.  LungRADS1, with recommendation to continue annual lung cancer screening with LDCT in 12 months.

## 2020-07-09 NOTE — HISTORY OF PRESENT ILLNESS
[Former] : former smoker [_____ pack-years] : [unfilled] pack-years [TextBox_13] : He denies hemoptysis, denies new cough, denies unexplained weight loss.\par He is a former smoker with a 45 pack year smoking history (1.5PPD x 30 years).  He quit 8 years ago.  He denies family h/o lung cancer.  He is referred by Dr. Magaly Cramer. He is Amharic speaking and we communicated through [employer]  ID#[id number]\par

## 2020-07-21 ENCOUNTER — APPOINTMENT (OUTPATIENT)
Dept: UROLOGY | Facility: CLINIC | Age: 65
End: 2020-07-21
Payer: MEDICAID

## 2020-07-21 VITALS — DIASTOLIC BLOOD PRESSURE: 71 MMHG | HEART RATE: 72 BPM | SYSTOLIC BLOOD PRESSURE: 150 MMHG | TEMPERATURE: 97.5 F

## 2020-07-21 DIAGNOSIS — K57.10 DIVERTICULOSIS OF SMALL INTESTINE W/OUT PERFORATION OR ABSCESS W/OUT BLEEDING: ICD-10-CM

## 2020-07-21 PROCEDURE — 99213 OFFICE O/P EST LOW 20 MIN: CPT

## 2020-07-21 NOTE — PHYSICAL EXAM
[General Appearance - Well Developed] : well developed [General Appearance - Well Nourished] : well nourished [Well Groomed] : well groomed [Normal Appearance] : normal appearance [General Appearance - In No Acute Distress] : no acute distress [Abdomen Soft] : soft [Costovertebral Angle Tenderness] : no ~M costovertebral angle tenderness [Urethral Meatus] : meatus normal [Abdomen Tenderness] : non-tender [Urinary Bladder Findings] : the bladder was normal on palpation [Scrotum] : the scrotum was normal [Testes Mass (___cm)] : there were no testicular masses [No Prostate Nodules] : no prostate nodules [Edema] : no peripheral edema [] : no respiratory distress [Respiration, Rhythm And Depth] : normal respiratory rhythm and effort [Oriented To Time, Place, And Person] : oriented to person, place, and time [Exaggerated Use Of Accessory Muscles For Inspiration] : no accessory muscle use [Affect] : the affect was normal [Mood] : the mood was normal [Not Anxious] : not anxious [Normal Station and Gait] : the gait and station were normal for the patient's age [No Focal Deficits] : no focal deficits [No Palpable Adenopathy] : no palpable adenopathy

## 2020-07-21 NOTE — HISTORY OF PRESENT ILLNESS
[FreeTextEntry1] : Very pleasant 65-year-old gentleman who presents for follow-up of BPH and screening for prostate cancer.  Patient feels well.  He denies dysuria.  No hematuria.  No flank pain or suprapubic pain.  No nausea or vomiting.  He presents today for screening for prostate cancer. he is happy with his symptoms. [Urinary Incontinence] : no urinary incontinence [Urinary Retention] : no urinary retention [Urinary Urgency] : no urinary urgency [Nocturia] : nocturia [Urinary Frequency] : no urinary frequency [Weak Stream] : no weak stream [Straining] : no straining [Hematuria - Gross] : no gross hematuria [Dysuria] : no dysuria [Bladder Spasm] : no bladder spasm [Abdominal Pain] : no abdominal pain [Fever] : no fever [Flank Pain] : no flank pain [Fatigue] : no fatigue [Nausea] : no nausea [Anorexia] : no anorexia

## 2020-07-21 NOTE — ASSESSMENT
[FreeTextEntry1] : Very pleasant 65-year-old gentleman who presents for follow-up of BPH, screening for prostate cancer\par -PSA today\par -Patient currently feels well and is asymptomatic from BPH and we will therefore defer additional treatments at this time\par -Follow-up in 1 year

## 2020-07-22 LAB — PSA SERPL-MCNC: 0.88 NG/ML

## 2020-08-13 ENCOUNTER — NON-APPOINTMENT (OUTPATIENT)
Age: 65
End: 2020-08-13

## 2020-08-13 ENCOUNTER — APPOINTMENT (OUTPATIENT)
Dept: INTERNAL MEDICINE | Facility: CLINIC | Age: 65
End: 2020-08-13
Payer: MEDICAID

## 2020-08-13 VITALS
OXYGEN SATURATION: 96 % | HEIGHT: 70 IN | HEART RATE: 60 BPM | DIASTOLIC BLOOD PRESSURE: 92 MMHG | SYSTOLIC BLOOD PRESSURE: 146 MMHG | TEMPERATURE: 98 F | RESPIRATION RATE: 16 BRPM | WEIGHT: 222 LBS | BODY MASS INDEX: 31.78 KG/M2

## 2020-08-13 PROCEDURE — 93000 ELECTROCARDIOGRAM COMPLETE: CPT

## 2020-08-13 PROCEDURE — 99214 OFFICE O/P EST MOD 30 MIN: CPT | Mod: 25

## 2020-08-13 NOTE — HISTORY OF PRESENT ILLNESS
[de-identified] : 65 year old  male patient with history of stable Essential Hypertension, Hypercholesterolemia, Chronic Hepatitis B, Vitamin D Deficiency, NAFLD,with , history as stated, presented for follow up examination. Patient is compliant with all medications. Denies shortness of breath, chest pain or abdominal pains at this time. ROS as stated.\par

## 2020-08-13 NOTE — ASSESSMENT
[FreeTextEntry1] : 65 year old male found to have stable Essential Hypertension, Hypercholesterolemia, Chronic Hepatitis B, Vitamin D Deficiency, NAFLD,with the current regimen, diet and life style modifications, as counseled. Prior results reviewed and discussed with the patient during today's examination. Plan as ordered.\par EKG showed NSR at the rate of 63 BPM, non-sp ST-T changes noted.\par

## 2020-08-13 NOTE — HEALTH RISK ASSESSMENT
[No] : No [No falls in past year] : Patient reported no falls in the past year [0] : 2) Feeling down, depressed, or hopeless: Not at all (0) [] : No [de-identified] : HEP/GI/URO [[id number]] : 0

## 2020-08-14 LAB
ALBUMIN SERPL ELPH-MCNC: 4.8 G/DL
ALP BLD-CCNC: 69 U/L
ALT SERPL-CCNC: 29 U/L
ANION GAP SERPL CALC-SCNC: 11 MMOL/L
AST SERPL-CCNC: 29 U/L
BASOPHILS # BLD AUTO: 0.07 K/UL
BASOPHILS NFR BLD AUTO: 0.8 %
BILIRUB SERPL-MCNC: 1.4 MG/DL
BUN SERPL-MCNC: 13 MG/DL
CALCIUM SERPL-MCNC: 9.8 MG/DL
CHLORIDE SERPL-SCNC: 101 MMOL/L
CHOLEST SERPL-MCNC: 219 MG/DL
CHOLEST/HDLC SERPL: 5 RATIO
CO2 SERPL-SCNC: 26 MMOL/L
CREAT SERPL-MCNC: 1.07 MG/DL
EOSINOPHIL # BLD AUTO: 0.43 K/UL
EOSINOPHIL NFR BLD AUTO: 5 %
ESTIMATED AVERAGE GLUCOSE: 105 MG/DL
GGT SERPL-CCNC: 30 U/L
GLUCOSE SERPL-MCNC: 75 MG/DL
HBA1C MFR BLD HPLC: 5.3 %
HCT VFR BLD CALC: 50.8 %
HDLC SERPL-MCNC: 44 MG/DL
HGB BLD-MCNC: 16.1 G/DL
IMM GRANULOCYTES NFR BLD AUTO: 0.2 %
LDLC SERPL CALC-MCNC: 154 MG/DL
LYMPHOCYTES # BLD AUTO: 2.81 K/UL
LYMPHOCYTES NFR BLD AUTO: 32.8 %
MAN DIFF?: NORMAL
MCHC RBC-ENTMCNC: 30.7 PG
MCHC RBC-ENTMCNC: 31.7 GM/DL
MCV RBC AUTO: 96.8 FL
MONOCYTES # BLD AUTO: 0.74 K/UL
MONOCYTES NFR BLD AUTO: 8.6 %
NEUTROPHILS # BLD AUTO: 4.5 K/UL
NEUTROPHILS NFR BLD AUTO: 52.6 %
PLATELET # BLD AUTO: 180 K/UL
POTASSIUM SERPL-SCNC: 5 MMOL/L
PROT SERPL-MCNC: 7.5 G/DL
RBC # BLD: 5.25 M/UL
RBC # FLD: 12.7 %
SARS-COV-2 IGG SERPL IA-ACNC: 0.09 INDEX
SARS-COV-2 IGG SERPL QL IA: NEGATIVE
SODIUM SERPL-SCNC: 139 MMOL/L
TRIGL SERPL-MCNC: 108 MG/DL
WBC # FLD AUTO: 8.57 K/UL

## 2020-08-19 ENCOUNTER — RX RENEWAL (OUTPATIENT)
Age: 65
End: 2020-08-19

## 2020-09-14 ENCOUNTER — APPOINTMENT (OUTPATIENT)
Dept: HEPATOLOGY | Facility: CLINIC | Age: 65
End: 2020-09-14

## 2020-11-19 ENCOUNTER — APPOINTMENT (OUTPATIENT)
Dept: INTERNAL MEDICINE | Facility: CLINIC | Age: 65
End: 2020-11-19
Payer: MEDICAID

## 2020-11-19 VITALS
HEART RATE: 66 BPM | HEIGHT: 70 IN | SYSTOLIC BLOOD PRESSURE: 136 MMHG | OXYGEN SATURATION: 98 % | WEIGHT: 228 LBS | BODY MASS INDEX: 32.64 KG/M2 | TEMPERATURE: 97.9 F | DIASTOLIC BLOOD PRESSURE: 86 MMHG

## 2020-11-19 DIAGNOSIS — D72.10 EOSINOPHILIA, UNSPECIFIED: ICD-10-CM

## 2020-11-19 PROCEDURE — 82270 OCCULT BLOOD FECES: CPT

## 2020-11-19 PROCEDURE — 99204 OFFICE O/P NEW MOD 45 MIN: CPT | Mod: 25

## 2020-11-19 NOTE — CONSULT LETTER
[Dear  ___] : Dear  [unfilled], [Courtesy Letter:] : I had the pleasure of seeing your patient, [unfilled], in my office today. [Please see my note below.] : Please see my note below. [Sincerely,] : Sincerely, [FreeTextEntry3] : Dr Fei Martins MDFACP

## 2020-11-19 NOTE — ASSESSMENT
[FreeTextEntry1] : 1 internal hemorrhoids  -  Dietary fiber — found mainly in fruits, vegetables, whole grains and legumes — is probably best known for its ability to prevent or relieve constipation. But foods containing fiber can provide other health benefits as well, such as helping to maintain a healthy weight and lowering your risk of diabetes and heart disease.\par \par Selecting tasty foods that provide fiber isn't difficult. Find out how much dietary fiber you need, the foods that contain it, and how to add them to meals and snacks.\par \par Dietary fiber, also known as roughage or bulk, includes the parts of plant foods your body can't digest or absorb. Unlike other food components, such as fats, proteins or carbohydrates — which your body breaks down and absorbs — fiber isn't digested by your body. Instead, it passes relatively intact through your stomach, small intestine and colon and out of your body.\par \par Fiber is commonly classified as soluble, which dissolves in water, or insoluble, which doesn't dissolve.\par •Soluble fiber. This type of fiber dissolves in water to form a gel-like material. It can help lower blood cholesterol and glucose levels. Soluble fiber is found in oats, peas, beans, apples, citrus fruits, carrots, barley and psyllium.\par •Insoluble fiber. This type of fiber promotes the movement of material through your digestive system and increases stool bulk, so it can be of benefit to those who struggle with constipation or irregular stools. Whole-wheat flour, wheat bran, nuts, beans and vegetables, such as cauliflower, green beans and potatoes, are good sources of insoluble fiber.\par \par Most plant-based foods, such as oatmeal and beans, contain both soluble and insoluble fiber. However, the amount of each type varies in different plant foods. To receive the greatest health benefit, eat a wide variety of high-fiber foods.\par \par A high-fiber diet has many benefits, which include:\par •Normalizes bowel movements. Dietary fiber increases the weight and size of your stool and softens it. A bulky stool is easier to pass, decreasing your chance of constipation. If you have loose, watery stools, fiber may help to solidify the stool because it absorbs water and adds bulk to stool.\par •Helps maintain bowel health. A high-fiber diet may lower your risk of developing hemorrhoids and small pouches in your colon (diverticular disease). Some fiber is fermented in the colon. Researchers are looking at how this may play a role in preventing diseases of the colon.\par •Lowers cholesterol levels. Soluble fiber found in beans, oats, flaxseed and oat bran may help lower total blood cholesterol levels by lowering low-density lipoprotein, or "bad," cholesterol levels. Studies also have shown that high-fiber foods may have other heart-health benefits, such as reducing blood pressure and inflammation.\par •Helps control blood sugar levels. In people with diabetes, fiber — particularly soluble fiber — can slow the absorption of sugar and help improve blood sugar levels. A healthy diet that includes insoluble fiber may also reduce the risk of developing type 2 diabetes.\par •Aids in achieving healthy weight. High-fiber foods tend to be more filling than low-fiber foods, so you're likely to eat less and stay satisfied longer. And high-fiber foods tend to take longer to eat and to be less "energy dense," which means they have fewer calories for the same volume of food.\par \par Another benefit attributed to dietary fiber is prevention of colorectal cancer. However, the evidence that fiber reduces colorectal cancer is mixed.\par \par The [employer], which provides science-based advice on matters of medicine and health, gives the following daily fiber recommendations for adults:\par \par \par \par Age 50 or younger\par \par Age 51 or older\par \par \par [employer] \par \par Men 38 grams 30 grams \par Women 25 grams 21 grams \par \par If you aren't getting enough fiber each day, you may need to boost your intake. Good choices include:\par •Whole-grain products\par •Fruits\par •Vegetables\par •Beans, peas and other legumes\par •Nuts and seeds\par \par Refined or processed foods — such as canned fruits and vegetables, pulp-free juices, white breads and pastas, and non-whole-grain cereals — are lower in fiber. The grain-refining process removes the outer coat (bran) from the grain, which lowers its fiber content. Enriched foods have some of the B vitamins and iron back after processing, but not the fiber.\par \par Whole foods rather than fiber supplements are generally better. Fiber supplements — such as Metamucil, Citrucel and FiberCon — don't provide the variety of fibers, vitamins, minerals and other beneficial nutrients that foods do.\par \par Another way to get more fiber is to eat foods, such as cereal, granola bars, yogurt, and ice cream, with fiber added. The added fiber usually is labeled as "inulin" or "chicory root." Some people complain of gassiness after eating foods with added fiber.\par \par However, some people may still need a fiber supplement if dietary changes aren't sufficient or if they have certain medical conditions, such as constipation, diarrhea or irritable bowel syndrome. Check with your doctor before taking fiber supplements.\par \par Need ideas for adding more fiber to your meals and snacks? Try these suggestions:\par •Jump-start your day. For breakfast choose a high-fiber breakfast cereal — 5 or more grams of fiber a serving. Opt for cereals with "whole grain," "bran" or "fiber" in the name. Or add a few tablespoons of unprocessed wheat bran to your favorite cereal.\par •Switch to whole grains. Consume at least half of all grains as whole grains. Look for breads that list whole wheat, whole-wheat flour or another whole grain as the first ingredient on the label and have least 2 grams of dietary fiber a serving. [place] with brown rice, wild rice, barley, whole-wheat pasta and bulgur wheat.\par •Bulk up baked goods. Substitute whole-grain flour for half or all of the white flour when baking. Try adding crushed bran cereal, unprocessed wheat bran or uncooked oatmeal to muffins, cakes and cookies.\par •Lean on legumes. Beans, peas and lentils are excellent sources of fiber. Add kidney beans to canned soup or a green salad. Or make nachos with refried black beans, lots of fresh veggies, whole-wheat tortilla chips and salsa.\par •Eat more fruit and vegetables. Fruits and vegetables are rich in fiber, as well as vitamins and minerals. Try to eat five or more servings daily.\par •Make snacks count. Fresh fruits, raw vegetables, low-fat popcorn and whole-grain crackers are all good choices. An occasional handful of nuts or dried fruits also is a healthy, high-fiber snack — although be aware that nuts and dried fruits are high in calories.\par \par High-fiber foods are good for your health. But adding too much fiber too quickly can promote intestinal gas, abdominal bloating and cramping. Increase fiber in your diet gradually over a period of a few weeks. This allows the natural bacteria in your digestive system to adjust to the change.\par \par Also, drink plenty of water. Fiber works best when it absorbs water, making your stool soft and bulky\par   I started medicated tuck and suppository \par 2.  chronic hep b alph feto protein us of abd and referral to   hepatologist and viral load.  \par 3.  Fatty Liver: The patient denies any jaundice or pruritus. The patient denies any alcohol use. The patient denies taking large doses of nonsteroidal anti-inflammatory drugs or acetaminophen. The findings are suggestive of fatty liver. The patient and I had a long discussion regarding the risks of fatty liver progressing to cirrhosis. The patient was told of the possible increased risk of developing liver failure, cirrhosis, ascites, GI bleeding secondary to varices, hepatic encephalopathy, bleeding tendencies and liver cancer. The patient was told of the importance of follow-up. The patient was advised to follow up every 6 months for blood work and imaging studies. The patient agreed and will follow up. The patient was advised to lose weight. I recommend a trial of vitamin E supplementation for the fatty liver. If the liver enzymes remain elevated, the patient may require a trial of Pioglitazone for the fatty liver. I recommend avoid alcohol and hepato-toxic agents. The patient was also advised to avoid NSAIDs, Acetaminophen and any other hepatotoxic drugs. The patient was also advised not to share needles, razors, scissors, nail clippers, etc.. The patient is to continue close follow-up in our office for blood work and exams. If the liver enzymes remain elevated, the patient may require a CT guided liver biopsy to assess the liver parenchyma and for possible treatment. We had a long discussion regarding the risks and benefits of the procedure. The patient was told of the risks of bleeding, perforation, infections, emergency surgery and missing lesions. The patient agreed and will follow-up to reassess the symptoms.\par \par 4 eosinophilia likely due to alleriges to food and environment will repeat today    \par 5 constipation  - Fiber  is a substance found in plants. Dietary fiber is a type of carbohydrate we eat. Eating the right amount of fiber has been shown to have a range of health benefits. It helps you feel full longer, which can curb overeating and weight gain. Eating fiber-rich foods aids in digestion  and the absorption of nutrients.\par \par Foods that are high in fiber can help treat certain issues. These include constipation, irritable bowel  syndrome (IBS), and diverticulitis. Dietary fiber can reduce your risk of coronary heart  disease, type 2 diabetes, and some cancers. It also may lower your cholesterol.\par \par Path to improved health\par \par The amount of fiber you should get from your daily diet  depends on your age and gender. Men 50 years of age and younger should consume at least 38 grams of fiber per day. Men older than 50 years of age should get at least 30 grams of fiber daily. Women 50 years of age and younger should consume at least 25 grams of fiber per day. Women older than 50 years of age should get at least 21 grams of fiber daily.\par \par The following changes can increase the fiber in your diet.\par 1.Eat at least 2 cups of fruits and 2 1/2 cups of vegetables each day. This can include:\par •1 artichoke\par •1 medium sweet potato\par •1 small pear\par •½ cup of green peas\par •½ cup of berries, such as raspberries or blackberries\par •½ cup of prunes\par •¼ cup figs or dates\par •½ cup of spinach\par •1 medium apple\par •1 medium orange.\par 2.Replace refined white bread with whole-grain breads and cereals. Choose brown rice instead of white rice. Eat the following foods:\par •100% whole-wheat bread\par •oatmeal\par •brown rice\par •bran muffins\par •bran or multiple-grain cereals, cooked or dry\par •popcorn (unbuttered)\par •almonds.\par 3.Check nutrition fact labels for the amount of dietary fiber. Try to get 5 grams of fiber per serving.\par 4.Add ¼ cup of wheat bran (metz’s bran) to foods. You can put it in cooked cereal, applesauce, or meat loaf.\par 5.Eat ½ cup cooked beans, such as navy, kidney, jackson, black, lima, or white beans.\par \par Things to consider\par \par Try not to add too much fiber to your diet at once. You may get symptoms such as bloating, cramping, or gas. You can prevent these by increasing your fiber slowly. Start with one of the changes listed above. Wait several days to a week before making another. If one change doesn’t seem to work for you, try a different one. Be sure to drink more fluids as you increase the amount of fiber you eat. Fluids help your body digest fiber. Try to drink 8 glasses a day. Choose no- or low-calorie beverages, such as water, unsweetened tea, or diet soda.\par If he has recurrent bleeding further evaluation will be needed. \par 6 hiatal hernia - discussed Rule of 2's; pt should avoid eating too much; too fast; too spicy; too lousy; less than two hours before bed \par -Things to avoid including overeating, spicy foods, tight clothing, eating within three hours of bed, this list is not all inclusive. \par -For treatment of reflux, possible options discussed including diet control, H2 blockers, PPIs, as well as coating motility agents discussed as treatment options. Timing of meals and proximity of last meal to sleep were discussed. If symptoms persist, a formal gastrointestinal evaluation is needed. \par \par

## 2020-11-19 NOTE — PHYSICAL EXAM
[General Appearance - Alert] : alert [General Appearance - In No Acute Distress] : in no acute distress [PERRL With Normal Accommodation] : pupils were equal in size, round, and reactive to light [Oropharynx] : the oropharynx was normal [Auscultation Breath Sounds / Voice Sounds] : lungs were clear to auscultation bilaterally [Apical Impulse] : the apical impulse was normal [Heart Rate And Rhythm] : heart rate was normal and rhythm regular [Heart Sounds] : normal S1 and S2 [Heart Sounds Gallop] : no gallops [Edema] : there was no peripheral edema [Obese] : obese [Normal] : normal [Soft, Nontender] : the abdomen was soft and nontender [No Mass] : no masses were palpated [No HSM] : no hepatosplenomegaly noted [Normal rectal exam] : was normal [Occult Blood Positive] : was negative for occult blood [Gross Blood] : no gross blood [Stool Sample Taken] : a stool sample was obtained [Fecal Impaction] : no fecal impaction was present [No CVA Tenderness] : no ~M costovertebral angle tenderness [Abnormal Walk] : normal gait [Nail Clubbing] : no clubbing  or cyanosis of the fingernails [Musculoskeletal - Swelling] : no joint swelling seen [Motor Tone] : muscle strength and tone were normal [Skin Color & Pigmentation] : normal skin color and pigmentation [Skin Turgor] : normal skin turgor [] : no rash [Oriented To Time, Place, And Person] : oriented to person, place, and time [Impaired Insight] : insight and judgment were intact [Affect] : the affect was normal

## 2020-11-19 NOTE — HISTORY OF PRESENT ILLNESS
[Heartburn] : improved heartburn [Nausea] : denies nausea [Vomiting] : denies vomiting [Diarrhea] : denies diarrhea [Yellow Skin Or Eyes (Jaundice)] : denies jaundice [Abdominal Pain] : denies abdominal pain [Rectal Pain] : denies rectal pain [Wt Gain ___ Lbs] : recent [unfilled] ~Upound(s) weight gain [Wt Loss ___ Lbs] : no recent weight loss [Constipation] : constipation [Abdominal Swelling] : abdominal swelling [GERD] : gastroesophageal reflux disease [de-identified] : translation with resident Dr Foster [de-identified] : Pt states he has antonella from above and below and has abd distention at times due to gas .He is taking lactaid before his meals.  this helps.  he has gained 6 lbs. he doesn’t have diarrhea .  he states 3 days ago he had blood in the toilet after a bm.  he had to strain for the bm.  The blood was not mixed in stool.  he states he was using his fingers to remove the stools and that’s when he had bleeding.  he has constipation.  It has not recurred.  he doesn’t have rectal pain but has itching and uses a cream which helps.  he drinks three  bottles of water a day.  he eats fruits and vegetables.    pt states chantal am he has reflux of acid

## 2020-11-22 LAB
AFP-TM SERPL-MCNC: 1.9 NG/ML
ALBUMIN SERPL ELPH-MCNC: 4.6 G/DL
ALP BLD-CCNC: 73 U/L
ALT SERPL-CCNC: 33 U/L
APPEARANCE: CLEAR
AST SERPL-CCNC: 30 U/L
BASOPHILS # BLD AUTO: 0.09 K/UL
BASOPHILS NFR BLD AUTO: 0.9 %
BILIRUB DIRECT SERPL-MCNC: 0.2 MG/DL
BILIRUB INDIRECT SERPL-MCNC: 0.9 MG/DL
BILIRUB SERPL-MCNC: 1.2 MG/DL
BILIRUBIN URINE: NEGATIVE
BLOOD URINE: NEGATIVE
COLOR: YELLOW
EOSINOPHIL # BLD AUTO: 0.52 K/UL
EOSINOPHIL NFR BLD AUTO: 5 %
GLUCOSE QUALITATIVE U: NEGATIVE
HBV CORE IGM SER QL: NONREACTIVE
HBV SURFACE AB SER QL: NONREACTIVE
HBV SURFACE AG SER QL: REACTIVE
HCT VFR BLD CALC: 50.4 %
HGB BLD-MCNC: 15.8 G/DL
IMM GRANULOCYTES NFR BLD AUTO: 0.2 %
KETONES URINE: NEGATIVE
LEUKOCYTE ESTERASE URINE: NEGATIVE
LYMPHOCYTES # BLD AUTO: 3.87 K/UL
LYMPHOCYTES NFR BLD AUTO: 37.6 %
MAN DIFF?: NORMAL
MCHC RBC-ENTMCNC: 31.1 PG
MCHC RBC-ENTMCNC: 31.3 GM/DL
MCV RBC AUTO: 99.2 FL
MONOCYTES # BLD AUTO: 1.02 K/UL
MONOCYTES NFR BLD AUTO: 9.9 %
NEUTROPHILS # BLD AUTO: 4.78 K/UL
NEUTROPHILS NFR BLD AUTO: 46.4 %
NITRITE URINE: NEGATIVE
PH URINE: 6
PLATELET # BLD AUTO: 169 K/UL
PROT SERPL-MCNC: 7.2 G/DL
PROTEIN URINE: NEGATIVE
RBC # BLD: 5.08 M/UL
RBC # FLD: 12.6 %
SPECIFIC GRAVITY URINE: 1.01
UROBILINOGEN URINE: NORMAL
WBC # FLD AUTO: 10.3 K/UL

## 2020-11-23 LAB
HBV DNA # SERPL NAA+PROBE: 1315 IU/ML
HEPB DNA PCR LOG: 3.12

## 2020-11-24 ENCOUNTER — APPOINTMENT (OUTPATIENT)
Dept: HEPATOLOGY | Facility: CLINIC | Age: 65
End: 2020-11-24

## 2020-11-24 LAB
HBV E AB SER QL: POSITIVE
HBV E AG SER QL: NEGATIVE

## 2020-12-31 PROBLEM — D72.10 EOSINOPHILIA: Status: ACTIVE | Noted: 2019-11-20

## 2021-02-18 ENCOUNTER — APPOINTMENT (OUTPATIENT)
Dept: INTERNAL MEDICINE | Facility: CLINIC | Age: 66
End: 2021-02-18
Payer: MEDICAID

## 2021-02-18 VITALS
BODY MASS INDEX: 32.64 KG/M2 | HEIGHT: 70 IN | DIASTOLIC BLOOD PRESSURE: 91 MMHG | WEIGHT: 228 LBS | HEART RATE: 67 BPM | SYSTOLIC BLOOD PRESSURE: 151 MMHG | RESPIRATION RATE: 16 BRPM | TEMPERATURE: 97.2 F | OXYGEN SATURATION: 97 %

## 2021-02-18 PROCEDURE — 99214 OFFICE O/P EST MOD 30 MIN: CPT

## 2021-02-18 PROCEDURE — 99072 ADDL SUPL MATRL&STAF TM PHE: CPT

## 2021-02-18 RX ORDER — LACTASE 9000 UNIT
9000 TABLET,CHEWABLE ORAL
Qty: 120 | Refills: 3 | Status: DISCONTINUED | COMMUNITY
Start: 2018-11-19 | End: 2021-02-18

## 2021-02-18 RX ORDER — WITCH HAZEL 50 %
50 PADS, MEDICATED (EA) TOPICAL
Qty: 1 | Refills: 2 | Status: DISCONTINUED | COMMUNITY
Start: 2020-02-26 | End: 2021-02-18

## 2021-02-18 NOTE — HISTORY OF PRESENT ILLNESS
[de-identified] : 65 year old  male patient with history of stable Essential Hypertension, Hypercholesterolemia, Chronic Hepatitis B, Vitamin D Deficiency, NAFLD, history as stated, presented for follow up examination. Patient is compliant with all medications. Denies shortness of breath, chest pain or abdominal pains at this time. ROS as stated.\par

## 2021-02-18 NOTE — HEALTH RISK ASSESSMENT
[No] : In the past 12 months have you used drugs other than those required for medical reasons? No [No falls in past year] : Patient reported no falls in the past year [0] : 2) Feeling down, depressed, or hopeless: Not at all (0) [] : No [de-identified] : GI [[id number]] : 0

## 2021-02-18 NOTE — ASSESSMENT
[FreeTextEntry1] : 65 year old male found to have stable Essential Hypertension, Hypercholesterolemia, Chronic Hepatitis B, Vitamin D Deficiency, NAFLD,with the current prescription regimen as recommended, diet and life style modifications, as counseled. Prior results reviewed, interpreted and discussed with the patient during today's examination, as appropriate. Follow up, treatment plan and tests, as ordered.\par \par Total time spent : 30 minutes\par Including:\par Preparation prior to visit - Reviewing prior record, results of tests and Consultation Reports as applicable\par Conducting an appropriate H & P during today's encounter\par Appropriate orders for tests, medications and procedures, as applicable\par Counseling patient \par Note completion\par

## 2021-02-19 LAB
25(OH)D3 SERPL-MCNC: 37.4 NG/ML
ALBUMIN SERPL ELPH-MCNC: 4.3 G/DL
ALP BLD-CCNC: 71 U/L
ALT SERPL-CCNC: 32 U/L
ANION GAP SERPL CALC-SCNC: 13 MMOL/L
APPEARANCE: CLEAR
AST SERPL-CCNC: 31 U/L
BASOPHILS # BLD AUTO: 0.07 K/UL
BASOPHILS NFR BLD AUTO: 0.8 %
BILIRUB SERPL-MCNC: 1 MG/DL
BILIRUBIN URINE: NEGATIVE
BLOOD URINE: NEGATIVE
BUN SERPL-MCNC: 12 MG/DL
CALCIUM SERPL-MCNC: 9.6 MG/DL
CHLORIDE SERPL-SCNC: 105 MMOL/L
CHOLEST SERPL-MCNC: 221 MG/DL
CO2 SERPL-SCNC: 24 MMOL/L
COLOR: YELLOW
CREAT SERPL-MCNC: 1.14 MG/DL
CREAT SPEC-SCNC: 135 MG/DL
EOSINOPHIL # BLD AUTO: 0.41 K/UL
EOSINOPHIL NFR BLD AUTO: 5 %
ERYTHROCYTE [SEDIMENTATION RATE] IN BLOOD BY WESTERGREN METHOD: 4 MM/HR
ESTIMATED AVERAGE GLUCOSE: 105 MG/DL
FOLATE SERPL-MCNC: 17 NG/ML
GGT SERPL-CCNC: 32 U/L
GLUCOSE QUALITATIVE U: NEGATIVE
GLUCOSE SERPL-MCNC: 90 MG/DL
HBA1C MFR BLD HPLC: 5.3 %
HCT VFR BLD CALC: 48.2 %
HDLC SERPL-MCNC: 46 MG/DL
HGB BLD-MCNC: 16.3 G/DL
IMM GRANULOCYTES NFR BLD AUTO: 0.4 %
IRON SATN MFR SERPL: 33 %
IRON SERPL-MCNC: 110 UG/DL
KETONES URINE: NEGATIVE
LDLC SERPL CALC-MCNC: 154 MG/DL
LEUKOCYTE ESTERASE URINE: NEGATIVE
LYMPHOCYTES # BLD AUTO: 2.76 K/UL
LYMPHOCYTES NFR BLD AUTO: 33.4 %
MAN DIFF?: NORMAL
MCHC RBC-ENTMCNC: 31.6 PG
MCHC RBC-ENTMCNC: 33.8 GM/DL
MCV RBC AUTO: 93.4 FL
MICROALBUMIN 24H UR DL<=1MG/L-MCNC: <1.2 MG/DL
MICROALBUMIN/CREAT 24H UR-RTO: NORMAL MG/G
MONOCYTES # BLD AUTO: 0.69 K/UL
MONOCYTES NFR BLD AUTO: 8.4 %
NEUTROPHILS # BLD AUTO: 4.3 K/UL
NEUTROPHILS NFR BLD AUTO: 52 %
NITRITE URINE: NEGATIVE
NONHDLC SERPL-MCNC: 175 MG/DL
PH URINE: 6
PLATELET # BLD AUTO: 169 K/UL
POTASSIUM SERPL-SCNC: 4.7 MMOL/L
PROT SERPL-MCNC: 7.2 G/DL
PROTEIN URINE: NEGATIVE
RBC # BLD: 5.16 M/UL
RBC # FLD: 12.2 %
SODIUM SERPL-SCNC: 142 MMOL/L
SPECIFIC GRAVITY URINE: 1.02
T3 SERPL-MCNC: 129 NG/DL
T4 FREE SERPL-MCNC: 1.2 NG/DL
TIBC SERPL-MCNC: 336 UG/DL
TRIGL SERPL-MCNC: 107 MG/DL
TSH SERPL-ACNC: 2.15 UIU/ML
UIBC SERPL-MCNC: 225 UG/DL
UROBILINOGEN URINE: NORMAL
VIT B12 SERPL-MCNC: 430 PG/ML
WBC # FLD AUTO: 8.26 K/UL

## 2021-02-22 ENCOUNTER — APPOINTMENT (OUTPATIENT)
Dept: INTERNAL MEDICINE | Facility: CLINIC | Age: 66
End: 2021-02-22
Payer: MEDICAID

## 2021-02-22 VITALS
HEART RATE: 70 BPM | HEIGHT: 70 IN | BODY MASS INDEX: 32.5 KG/M2 | OXYGEN SATURATION: 98 % | WEIGHT: 227 LBS | DIASTOLIC BLOOD PRESSURE: 95 MMHG | SYSTOLIC BLOOD PRESSURE: 143 MMHG | TEMPERATURE: 97.8 F

## 2021-02-22 PROCEDURE — 99214 OFFICE O/P EST MOD 30 MIN: CPT | Mod: 25

## 2021-02-22 PROCEDURE — 99072 ADDL SUPL MATRL&STAF TM PHE: CPT

## 2021-02-22 PROCEDURE — 82270 OCCULT BLOOD FECES: CPT

## 2021-02-22 RX ORDER — ERGOCALCIFEROL 1.25 MG/1
1.25 MG CAPSULE, LIQUID FILLED ORAL
Qty: 4 | Refills: 5 | Status: COMPLETED | COMMUNITY
Start: 2020-02-13 | End: 2021-02-22

## 2021-02-22 NOTE — END OF VISIT
[FreeTextEntry3] : resident present [Time Spent: ___ minutes] : I have spent [unfilled] minutes of time on the encounter. [>50% of the face to face encounter time was spent on counseling and/or coordination of care for ___] : Greater than 50% of the face to face encounter time was spent on counseling and/or coordination of care for [unfilled]

## 2021-02-22 NOTE — PHYSICAL EXAM
[General Appearance - Alert] : alert [General Appearance - In No Acute Distress] : in no acute distress [PERRL With Normal Accommodation] : pupils were equal in size, round, and reactive to light [Oropharynx] : the oropharynx was normal [Auscultation Breath Sounds / Voice Sounds] : lungs were clear to auscultation bilaterally [Apical Impulse] : the apical impulse was normal [Heart Rate And Rhythm] : heart rate was normal and rhythm regular [Heart Sounds] : normal S1 and S2 [Murmurs] : no murmurs [Edema] : there was no peripheral edema [Soft, Nontender] : the abdomen was soft and nontender [Rebound] : no rebound [Guarding] : no guarding [No Mass] : no masses were palpated [No HSM] : no hepatosplenomegaly noted [Normal rectal exam] : was normal [None] : there were no anal fissures seen [Internal Hemorrhoid] : no internal hemorrhoids were present [External Hemorrhoid] : no external hemorrhoids were present [Tender, Swollen] : that was nontender and non-swollen [Normal] : was normal [Occult Blood Positive] : was negative for occult blood [Gross Blood] : no gross blood [Stool Sample Taken] : a stool sample was obtained [Fecal Impaction] : no fecal impaction was present [Cervical Lymph Nodes Enlarged Posterior Bilaterally] : posterior cervical [Cervical Lymph Nodes Enlarged Anterior Bilaterally] : anterior cervical [No CVA Tenderness] : no ~M costovertebral angle tenderness [Abnormal Walk] : normal gait [Nail Clubbing] : no clubbing  or cyanosis of the fingernails [Musculoskeletal - Swelling] : no joint swelling seen [Motor Tone] : muscle strength and tone were normal [Skin Color & Pigmentation] : normal skin color and pigmentation [Skin Turgor] : normal skin turgor [] : no rash [Deep Tendon Reflexes (DTR)] : deep tendon reflexes were 2+ and symmetric [Sensation] : the sensory exam was normal to light touch and pinprick [No Focal Deficits] : no focal deficits [Oriented To Time, Place, And Person] : oriented to person, place, and time [Impaired Insight] : insight and judgment were intact [Affect] : the affect was normal

## 2021-02-22 NOTE — ASSESSMENT
[FreeTextEntry1] : rectal bleeding Pt is not anemic but has repeated episodes of bleeding rectally and will have repeat colonoscopy to determine if another causes is present.   WE discussed procedure and will return prior for blood testing and instruction.  Colon and rectal cancer screening is a way in which doctors check the colon and rectum for signs of cancer or growths (called polyps) that might become cancer. It is done in people who have no symptoms and no reason to think they have cancer. The goal is to find and remove polyps before they become cancer, or to find cancer early, before it grows, spreads, or causes problems.\par \par The colon and rectum are the last part of the digestive tract (figure 1). When doctors talk about colon and rectal cancer screening, they use the term "colorectal." That is just a shorter way of saying "colon and rectal." It's also possible to say just colon cancer screening.\par \par Studies show that having colon cancer screening lowers the chance of dying from colon cancer. There are several different types of screening test that can do this.\par \par What are the different screening tests for colon cancer? — They include:\par \par ?Colonoscopy – Colonoscopy allows the doctor to see directly inside the entire colon. Before you can have a colonoscopy, you must clean out your colon. You do this at home by drinking a special liquid that causes watery diarrhea for several hours. On the day of the test, you get medicine to help you relax. Then a doctor puts a thin tube into your anus and advances it into your colon (figure 2). The tube has a tiny camera attached to it, so the doctor can see inside your colon. The tube also has tiny tools on the end, so the doctor can remove pieces of tissue or polyps if they are there. After polyps or pieces of tissue are removed, they are sent to a lab to be checked for cancer.\par \par \par •Advantages of this test – Colonoscopy finds most small polyps and almost all large polyps and cancers. If found, polyps can be removed right away. This test gives the most accurate results. If any other screening tests are done first and come back positive, a colonoscopy will need to be done for follow-up. If you have a colonoscopy as your first test, you will probably not need a second follow-up test soon after.\par \par \par •Drawbacks to this test – Colonoscopy has some small risks. It can cause bleeding or tear the inside of the colon, but this only happens in 1 out of 1,000 people. Also, cleaning out the bowel beforehand can be unpleasant. Plus, people usually cannot work or drive for the rest of the day after the test, because of the relaxation medicine they must take during the test.\par \par \par Sigmoidoscopy – A sigmoidoscopy is similar to a colonoscopy. The difference is that this test looks only at the last part of the colon, and a colonoscopy looks at the whole colon. Before you have a sigmoidoscopy, you must clean out the lower part of your colon using an enema. This bowel cleaning is not as thorough or unpleasant as the one for colonoscopy. For this test, you do not need to take medicines to help you relax, so you can drive and work afterward if you want.\par \par \par •Advantages of this test – Sigmoidoscopy can find polyps and cancers in the rectum and the last part of the colon. If polyps are found, they can be removed right away.\par \par \par •Drawbacks to this test – In about 2 out of 10,000 people, sigmoidoscopy tears the inside of the colon. The test also can't find polyps or cancers that are in the part of the colon the test does not view (figure 3). If doctors find polyps or cancer during a sigmoidoscopy, they usually follow up with a colonoscopy.\par \par \par CT colonography (also known as virtual colonoscopy or CTC) – CTC looks for cancer and polyps using a special X-ray called a "CT scan." For most CTC tests, the preparation is the same as it is for colonoscopy.\par \par \par •Advantages of this test – CTC can find polyps and cancers in the whole colon without the need for medicines to relax.\par \par \par •Drawbacks to this test – If doctors find polyps or cancer with CTC, they usually follow up with a colonoscopy. CTC sometimes finds areas that look abnormal but that turn out to be healthy. This means that CTC can lead to tests and procedures you did not need. Plus, CTC exposes you to radiation. In most cases, the preparation needed to clean the bowel is the same as the one needed for a colonoscopy. The test is expensive, and some insurance companies might not cover this test for screening.\par \par \par Stool test for blood – "Stool" is another word for bowel movements. Stool tests most commonly check for blood in samples of stool. Cancers and polyps can bleed, and if they bleed around the time you do the stool test, then blood will show up on the test. The test can find even small amounts of blood that you can't see in your stool. Other less serious conditions can also cause small amounts of blood in the stool, and that will show up in this test. You will have to collect small samples from your bowel movements, which you will put in a special container you get from your doctor or nurse. Then you follow the instructions to mail the container out for the testing.\par \par \par •Advantages of this test – This test does not involve cleaning out the colon or having any procedures.\par \par \par •Drawbacks to this test – Stool tests are less likely to find polyps than other screening tests. These tests also often come up abnormal even in people who do not have cancer. If a stool test shows something abnormal, doctors usually follow up with a colonoscopy.\par \par \par Stool DNA test – The stool DNA test checks for genetic markers of cancer, as well as for signs of blood. For this test, you get a special kit in order to collect a whole bowel movement. Then you follow the instructions about how and where to ship it.\par \par \par •Advantages of this test – This test does not involve cleaning out the colon or having any procedures. When cancer is not present, it is less likely to be falsely abnormal than a stool test for blood. That means it leads to fewer unnecessary colonoscopies.\par \par \par •Drawbacks to this test – It might be unpleasant to collect and ship a whole bowel movement. If a DNA test shows something abnormal, doctors usually follow up with a colonoscopy.\par \par \par There is no blood test that most experts think is accurate enough to use for screening.\par \par How do I choose which test to have? — Work with your doctor or nurse to decide which test is best for you. Some doctors might choose to combine screening tests, for example, sigmoidoscopy plus stool testing for blood. Being screened–no matter how–is more important than which test you choose.\par \par Who should be screened for colon cancer? — Doctors recommend that most people begin having colon cancer screening at age 50. People who have an increased risk of getting colon cancer sometimes begin screening at a younger age. That might include people with a strong family history of colon cancer, and people with diseases of the colon called "Crohn's disease" and "ulcerative colitis."\par \par Most people can stop being screened around the age of 75, or at the latest 85.\par \par How often should I be screened? — That depends on your risk of colon cancer and which test you have. People who have a high risk of colon cancer often need to be tested more often and should have a colonoscopy.\par \par Most people are not at high risk, so they can choose one of these schedules:\par \par Colonoscopy every 10 years\par \par CT colonography (CTC) every 5 years\par \par Sigmoidoscopy every 5 to 10 years\par \par Stool testing for blood once a year\par \par Stool DNA testing every 3 years (but doctors are not yet sure of the best time frame)\par 2 constipation has improved and will continue to eat high fiber diet  and try to drink more water .  \par 3 dyspepsia improved occurs occasionally discussed Rule of 2's; pt should avoid eating too much; too fast; too spicy; too lousy; less than two hours before bed \par -Things to avoid including overeating, spicy foods, tight clothing, eating within three hours of bed, this list is not all inclusive. \par -For treatment of reflux, possible options discussed including diet control, H2 blockers, PPIs, as well as coating motility agents discussed as treatment options. Timing of meals and proximity of last meal to sleep were discussed. If symptoms persist, a formal gastrointestinal evaluation is needed. \par \par 4 Fatty Liver: The patient denies any jaundice or pruritus. The patient denies any alcohol use. The patient denies taking large doses of nonsteroidal anti-inflammatory drugs or acetaminophen. The findings are suggestive of fatty liver. The patient and I had a long discussion regarding the risks of fatty liver progressing to cirrhosis. The patient was told of the possible increased risk of developing liver failure, cirrhosis, ascites, GI bleeding secondary to varices, hepatic encephalopathy, bleeding tendencies and liver cancer. The patient was told of the importance of follow-up. The patient was advised to follow up every 6 months for blood work and imaging studies. The patient agreed and will follow up. The patient was advised to lose weight. I recommend a trial of vitamin E supplementation for the fatty liver. If the liver enzymes remain elevated, the patient may require a trial of Pioglitazone for the fatty liver. I recommend avoid alcohol and hepato-toxic agents. The patient was also advised to avoid NSAIDs, Acetaminophen and any other hepatotoxic drugs. The patient was also advised not to share needles, razors, scissors, nail clippers, etc.. The patient is to continue close follow-up in our office for blood work and exams. If the liver enzymes remain elevated, the patient may require a CT guided liver biopsy to assess the liver parenchyma and for possible treatment. We had a long discussion regarding the risks and benefits of the procedure. The patient was told of the risks of bleeding, perforation, infections, emergency surgery and missing lesions. The patient agreed and will follow-up to reassess the symptoms Patient has been counseled on life style interventions, including but not limited to: weight loss (3-5% loss of body weight might improve fat in the liver, while 7-10% needed for potential improvement of other components, including inflammation and scarring of the liver, called fibrosis); healthy diet, avoiding added sugars, sodas, avoiding saturated fats, limiting sodium, avoiding alcohol; and on the importance of regular exercise (> 150 min/week moderate intensity aerobic exercise with at least 2x/week muscle strengthening or exercise as tolerated). \par - I explained to patient the natural Hx of NAFLD. \par \par fu us of abdomen is needed.

## 2021-02-22 NOTE — CONSULT LETTER
[Dear  ___] : Dear  [unfilled], [Courtesy Letter:] : I had the pleasure of seeing your patient, [unfilled], in my office today. [Please see my note below.] : Please see my note below. [Consult Closing:] : Thank you very much for allowing me to participate in the care of this patient.  If you have any questions, please do not hesitate to contact me. [Sincerely,] : Sincerely, [FreeTextEntry3] : Fei Martins MDFACP

## 2021-02-22 NOTE — HISTORY OF PRESENT ILLNESS
[Heartburn] : stable heartburn [Nausea] : denies nausea [Vomiting] : denies vomiting [Diarrhea] : denies diarrhea [Constipation] : improvement in constipation [Yellow Skin Or Eyes (Jaundice)] : denies jaundice [Abdominal Swelling] : denies abdominal swelling [Rectal Pain] : denies rectal pain [Wt Loss ___ Lbs] : recent [unfilled] ~Upound(s) weight loss [Abdominal Pain] : abdominal pain [GERD] : gastroesophageal reflux disease [Hiatus Hernia] : hiatus hernia [Peptic Ulcer Disease] : peptic ulcer disease [Kidney Stone] : kidney stone [Good Compliance] : good compliance with treatment [Wt Gain ___ Lbs] : no recent weight gain [Pancreatitis] : no pancreatitis [Cholelithiasis] : no cholelithiasis [Inflammatory Bowel Disease] : no inflammatory bowel disease [Irritable Bowel Syndrome] : no irritable bowel syndrome [Diverticulitis] : no diverticulitis [Alcohol Abuse] : no alcohol abuse [Malignancy] : no malignancy [Abdominal Surgery] : no abdominal surgery [Appendectomy] : no appendectomy [Cholecystectomy] : no cholecystectomy [de-identified] : pt has feeling well.  He is having normal bowel movements daily .  he states he has seen blood on toilet tissue  when he wipes himself .  he states it occurs occasionally around every 15 days. It is bright red blood.  he doesn’t have pain in rectum or itching.  The blood is seen when he forces his bm.  he drinks 2-3 times a day.  and should drink 8 glasses a day.  He eats fruits and vegetables.  he is not anemic and doesn’t have weight loss but has occ pain in his right abdomen.  He has not had  change of size or shape of his stools.  No nausea or vomiting.  His last colonoscopy in 2016 revealed diffuse diverticulosis.    he has slight reflux of acid  in am  soemtimes.

## 2021-02-22 NOTE — REASON FOR VISIT
[Follow-Up: _____] : a [unfilled] follow-up visit [[employer] ] : provided by [employer]   [FreeTextEntry1] : [id number] [FreeTextEntry2] : dylan [TWNoteComboBox1] : [place]

## 2021-02-24 LAB — HEMOCCULT STL QL IA: NEGATIVE

## 2021-03-03 ENCOUNTER — OUTPATIENT (OUTPATIENT)
Dept: OUTPATIENT SERVICES | Facility: HOSPITAL | Age: 66
LOS: 1 days | End: 2021-03-03
Payer: MEDICAID

## 2021-03-03 ENCOUNTER — APPOINTMENT (OUTPATIENT)
Dept: ULTRASOUND IMAGING | Facility: HOSPITAL | Age: 66
End: 2021-03-03
Payer: MEDICAID

## 2021-03-03 ENCOUNTER — RESULT REVIEW (OUTPATIENT)
Age: 66
End: 2021-03-03

## 2021-03-03 DIAGNOSIS — K76.0 FATTY (CHANGE OF) LIVER, NOT ELSEWHERE CLASSIFIED: ICD-10-CM

## 2021-03-03 PROCEDURE — 76700 US EXAM ABDOM COMPLETE: CPT | Mod: 26

## 2021-03-03 PROCEDURE — 76700 US EXAM ABDOM COMPLETE: CPT

## 2021-03-07 ENCOUNTER — RX RENEWAL (OUTPATIENT)
Age: 66
End: 2021-03-07

## 2021-03-23 ENCOUNTER — APPOINTMENT (OUTPATIENT)
Dept: INTERNAL MEDICINE | Facility: CLINIC | Age: 66
End: 2021-03-23
Payer: MEDICAID

## 2021-03-23 ENCOUNTER — APPOINTMENT (OUTPATIENT)
Dept: CARDIOLOGY | Facility: CLINIC | Age: 66
End: 2021-03-23
Payer: MEDICAID

## 2021-03-23 VITALS
SYSTOLIC BLOOD PRESSURE: 143 MMHG | TEMPERATURE: 97.5 F | DIASTOLIC BLOOD PRESSURE: 82 MMHG | BODY MASS INDEX: 32.64 KG/M2 | OXYGEN SATURATION: 95 % | HEART RATE: 80 BPM | WEIGHT: 228 LBS | HEIGHT: 70 IN

## 2021-03-23 DIAGNOSIS — R94.31 ABNORMAL ELECTROCARDIOGRAM [ECG] [EKG]: ICD-10-CM

## 2021-03-23 DIAGNOSIS — I45.10 UNSPECIFIED RIGHT BUNDLE-BRANCH BLOCK: ICD-10-CM

## 2021-03-23 PROCEDURE — 99072 ADDL SUPL MATRL&STAF TM PHE: CPT

## 2021-03-23 PROCEDURE — 93000 ELECTROCARDIOGRAM COMPLETE: CPT

## 2021-03-23 PROCEDURE — 99204 OFFICE O/P NEW MOD 45 MIN: CPT

## 2021-03-23 PROCEDURE — 99214 OFFICE O/P EST MOD 30 MIN: CPT | Mod: 25

## 2021-03-23 NOTE — RESULTS/DATA
[ECG Reviewed] : reviewed [Ventricular Rate___] : ventricular rate is [unfilled] beats per minute [P Waves Normal] : the P wave is normal [ECG Intervals MA.] : MA interval is normal [AK Interval___] : [unfilled] seconds [LAD] : left axis deviation [QRS Interval___] : QRS interval: [unfilled] seconds [Incomplete RBBB] : incomplete right bundle branch block [QTc Interval___] : QTc interval: [unfilled] [FreeTextEntry3] : pvc possible abberant  apc

## 2021-03-23 NOTE — HISTORY OF PRESENT ILLNESS
[No Pertinent Cardiac History] : no history of aortic stenosis, atrial fibrillation, coronary artery disease, recent myocardial infarction, or implantable device/pacemaker [No Pertinent Pulmonary History] : no history of asthma, COPD, sleep apnea, or smoking [No Adverse Anesthesia Reaction] : no adverse anesthesia reaction in self or family member [Chronic Anticoagulation] : no chronic anticoagulation [Chronic Kidney Disease] : no chronic kidney disease [Diabetes] : no diabetes [(Patient denies any chest pain, claudication, dyspnea on exertion, orthopnea, palpitations or syncope)] : Patient denies any chest pain, claudication, dyspnea on exertion, orthopnea, palpitations or syncope [FreeTextEntry1] : colonoscopy [FreeTextEntry2] : 4/2/21 [FreeTextEntry3] : Shoaib  [FreeTextEntry4] : pt is a 65 yr old who has been seeing blood on the toilet tissue after a bm  and is happening every 15 days  .  it is bright red blood.  he is not anemic and had last colonoscopy in 2016 which revealed diffuse diverticulosis.

## 2021-03-23 NOTE — ASSESSMENT
[Patient Requires Further Testing] : Patient requires further testing [ECG] : ECG [Cardiology consultation] : Cardiology consultation [FreeTextEntry4] : Pt has abnormal ekg and I discussed with Dr Montes and he will go up to see him now and Dr Fleming the cardiologist Theses are new findings on ekg and I don’t want him to go forward with the procedure until he is evaluated

## 2021-03-23 NOTE — PHYSICAL EXAM
[Well Developed] : well developed [Well Nourished] : well nourished [Normal Sclera/Conjunctiva] : normal sclera/conjunctiva [PERRL] : pupils equal round and reactive to light [Normal Oropharynx] : the oropharynx was normal [No JVD] : no jugular venous distention [Supple] : supple [Normal Rate] : the respiratory rate was normal [Rate ___] : at [unfilled] breaths per minute [Normal Rhythm/Effort] : normal respiratory rhythm and effort [Clear Bilaterally] : the lungs were clear to auscultation bilaterally [Normal to Percussion] : the lungs were normal to percussion [5th Left ICS - MCL] : palpated at the 5th LICS in the midclavicular line [Heart Rate ___] : [unfilled] bpm [Premature Beats] : regular with premature beats [Rt] : no varicose veins of the right leg [Lt] : no varicose veins of the left leg [Right Carotid Bruit] : no bruit heard over the right carotid [Left Carotid Bruit] : no bruit heard over the left carotid [Right Femoral Bruit] : no bruit heard over the right femoral artery [Left Femoral Bruit] : no bruit heard over the left femoral artery [2+] : left 2+ [No Abnormalities] : the abdominal aorta was not enlarged and no bruit was heard [Bruit] : no bruit heard [No Edema] : there was no peripheral edema [No Extremity Clubbing/Cyanosis] : no extremity clubbing/cyanosis [Soft, Nontender] : the abdomen was soft and nontender [No Mass] : no masses were palpated [No HSM] : no hepatosplenomegaly noted [Normal Station and Gait] : the gait and station were normal [Normal Motor Tone] : the muscle tone was normal [Involuntary Movements] : no involuntary movements were seen [Normal Scalp] : inspection of the scalp showed no abnormalities [Examination Of The Hair] : texture and distribution of hair was normal [Abnormal Color] : normal color and pigmentation [Complexion Medium] : medium complexion [Skin Lesions 1] : no skin lesions were observed [Skin Turgor Decreased] : normal skin turgor [Normal] : affect was normal and insight and judgment were intact [de-identified] : homans negative bilateral

## 2021-03-31 ENCOUNTER — APPOINTMENT (OUTPATIENT)
Dept: INTERNAL MEDICINE | Facility: CLINIC | Age: 66
End: 2021-03-31
Payer: MEDICAID

## 2021-03-31 VITALS
TEMPERATURE: 97.2 F | OXYGEN SATURATION: 98 % | HEART RATE: 65 BPM | BODY MASS INDEX: 32.07 KG/M2 | DIASTOLIC BLOOD PRESSURE: 91 MMHG | WEIGHT: 224 LBS | SYSTOLIC BLOOD PRESSURE: 149 MMHG | HEIGHT: 70 IN

## 2021-03-31 PROCEDURE — 99072 ADDL SUPL MATRL&STAF TM PHE: CPT

## 2021-03-31 PROCEDURE — 99213 OFFICE O/P EST LOW 20 MIN: CPT

## 2021-03-31 NOTE — REASON FOR VISIT
[Follow-Up: _____] : a [unfilled] follow-up visit [[employer] ] : provided by [employer]   [FreeTextEntry1] : [id number] [FreeTextEntry2] : [place]  [TWNoteComboBox1] : [place]

## 2021-03-31 NOTE — ASSESSMENT
[FreeTextEntry1] : 1 colon cancer screening   he will hold off until cardiac evaluation is completed \par 2 rectal bleeding has stopped for now  will continue to eat more fiber and drink more water.  \par 3. constipation - Fiber  is a substance found in plants. Dietary fiber is a type of carbohydrate we eat. Eating the right amount of fiber has been shown to have a range of health benefits. It helps you feel full longer, which can curb overeating and weight gain. Eating fiber-rich foods aids in digestion  and the absorption of nutrients.\par \par Foods that are high in fiber can help treat certain issues. These include constipation, irritable bowel  syndrome (IBS), and diverticulitis. Dietary fiber can reduce your risk of coronary heart  disease, type 2 diabetes, and some cancers. It also may lower your cholesterol.\par \par Path to improved health\par \par The amount of fiber you should get from your daily diet  depends on your age and gender. Men 50 years of age and younger should consume at least 38 grams of fiber per day. Men older than 50 years of age should get at least 30 grams of fiber daily. Women 50 years of age and younger should consume at least 25 grams of fiber per day. Women older than 50 years of age should get at least 21 grams of fiber daily.\par \par The following changes can increase the fiber in your diet.\par 1.Eat at least 2 cups of fruits and 2 1/2 cups of vegetables each day. This can include:\par •1 artichoke\par •1 medium sweet potato\par •1 small pear\par •½ cup of green peas\par •½ cup of berries, such as raspberries or blackberries\par •½ cup of prunes\par •¼ cup figs or dates\par •½ cup of spinach\par •1 medium apple\par •1 medium orange.\par 2.Replace refined white bread with whole-grain breads and cereals. Choose brown rice instead of white rice. Eat the following foods:\par •100% whole-wheat bread\par •oatmeal\par •brown rice\par •bran muffins\par •bran or multiple-grain cereals, cooked or dry\par •popcorn (unbuttered)\par •almonds.\par 3.Check nutrition fact labels for the amount of dietary fiber. Try to get 5 grams of fiber per serving.\par 4.Add ¼ cup of wheat bran (metz’s bran) to foods. You can put it in cooked cereal, applesauce, or meat loaf.\par 5.Eat ½ cup cooked beans, such as navy, kidney, jackson, black, lima, or white beans.\par \par Things to consider\par \par Try not to add too much fiber to your diet at once. You may get symptoms such as bloating, cramping, or gas. You can prevent these by increasing your fiber slowly. Start with one of the changes listed above. Wait several days to a week before making another. If one change doesn’t seem to work for you, try a different one. Be sure to drink more fluids as you increase the amount of fiber you eat. Fluids help your body digest fiber. Try to drink 8 glasses a day. Choose no- or low-calorie beverages, such as water, unsweetened tea, or diet soda. this has improved  \par  4 Fatty Liver: The patient denies any jaundice or pruritus. The patient denies any alcohol use. The patient denies taking large doses of nonsteroidal anti-inflammatory drugs or acetaminophen. The findings are suggestive of fatty liver. The patient and I had a long discussion regarding the risks of fatty liver progressing to cirrhosis. The patient was told of the possible increased risk of developing liver failure, cirrhosis, ascites, GI bleeding secondary to varices, hepatic encephalopathy, bleeding tendencies and liver cancer. The patient was told of the importance of follow-up. The patient was advised to follow up every 6 months for blood work and imaging studies. The patient agreed and will follow up. The patient was advised to lose weight. I recommend a trial of vitamin E supplementation for the fatty liver. If the liver enzymes remain elevated, the patient may require a trial of Pioglitazone for the fatty liver. I recommend avoid alcohol and hepato-toxic agents. The patient was also advised to avoid NSAIDs, Acetaminophen and any other hepatotoxic drugs. The patient was also advised not to share needles, razors, scissors, nail clippers, etc.. The patient is to continue close follow-up in our office for blood work and exams. If the liver enzymes remain elevated, the patient may require a CT guided liver biopsy to assess the liver parenchyma and for possible treatment. We had a long discussion regarding the risks and benefits of the procedure. The patient was told of the risks of bleeding, perforation, infections, emergency surgery and missing lesions. The patient agreed and will follow-up to reassess the symptoms Patient has been counseled on life style interventions, including but not limited to: weight loss (3-5% loss of body weight might improve fat in the liver, while 7-10% needed for potential improvement of other components, including inflammation and scarring of the liver, called fibrosis); healthy diet, avoiding added sugars, sodas, avoiding saturated fats, limiting sodium, avoiding alcohol; and on the importance of regular exercise (> 150 min/week moderate intensity aerobic exercise with at least 2x/week muscle strengthening or exercise as tolerated). \par - I explained to patient the natural Hx of NAFLD. \par \par us abd fatty liver  no lesions.

## 2021-03-31 NOTE — HISTORY OF PRESENT ILLNESS
[Heartburn] : improved heartburn [Nausea] : denies nausea [Vomiting] : denies vomiting [Diarrhea] : denies diarrhea [Constipation] : denies constipation [Yellow Skin Or Eyes (Jaundice)] : denies jaundice [Abdominal Pain] : denies abdominal pain [Abdominal Swelling] : denies abdominal swelling [Rectal Pain] : denies rectal pain [Wt Gain ___ Lbs] : no recent weight gain [Wt Loss ___ Lbs] : no recent weight loss [de-identified] : Pt has seen the cardiologist and has to have further testing for his heart before the procedures  can be done.  He needs stress test and Holter monitor .   he will need to hold off on colon cancer screening till it is safe for him to have the procedure.   He doesn’t have abdominal pain  H ehas not had rectal bleeding for the last several days.    He has been started on  atorvastatin and bp medication.   Pt has fattly liver on sonogram

## 2021-03-31 NOTE — PHYSICAL EXAM
[General Appearance - Alert] : alert [General Appearance - In No Acute Distress] : in no acute distress [PERRL With Normal Accommodation] : pupils were equal in size, round, and reactive to light [Oropharynx] : the oropharynx was normal [Auscultation Breath Sounds / Voice Sounds] : lungs were clear to auscultation bilaterally [Apical Impulse] : the apical impulse was normal [Heart Rate And Rhythm] : heart rate was normal and rhythm regular [Heart Sounds] : normal S1 and S2 [Heart Sounds Gallop] : no gallops [Murmurs] : no murmurs [Full Pulse] : the pedal pulses are present [Edema] : there was no peripheral edema [Bowel Sounds] : normal bowel sounds [Abdomen Soft] : soft [Abdomen Tenderness] : non-tender [Abdomen Mass (___ Cm)] : no abdominal mass palpated [Cervical Lymph Nodes Enlarged Posterior Bilaterally] : posterior cervical [Cervical Lymph Nodes Enlarged Anterior Bilaterally] : anterior cervical [Supraclavicular Lymph Nodes Enlarged Bilaterally] : supraclavicular [Axillary Lymph Nodes Enlarged Bilaterally] : axillary [Femoral Lymph Nodes Enlarged Bilaterally] : femoral [Inguinal Lymph Nodes Enlarged Bilaterally] : inguinal [No CVA Tenderness] : no ~M costovertebral angle tenderness [Abnormal Walk] : normal gait [Nail Clubbing] : no clubbing  or cyanosis of the fingernails [Musculoskeletal - Swelling] : no joint swelling seen [Motor Tone] : muscle strength and tone were normal [Skin Color & Pigmentation] : normal skin color and pigmentation [Skin Turgor] : normal skin turgor [] : no rash [Deep Tendon Reflexes (DTR)] : deep tendon reflexes were 2+ and symmetric [Sensation] : the sensory exam was normal to light touch and pinprick [No Focal Deficits] : no focal deficits [Oriented To Time, Place, And Person] : oriented to person, place, and time [Impaired Insight] : insight and judgment were intact [Affect] : the affect was normal

## 2021-04-02 ENCOUNTER — APPOINTMENT (OUTPATIENT)
Dept: INTERNAL MEDICINE | Facility: HOSPITAL | Age: 66
End: 2021-04-02

## 2021-04-07 ENCOUNTER — OUTPATIENT (OUTPATIENT)
Dept: OUTPATIENT SERVICES | Facility: HOSPITAL | Age: 66
LOS: 1 days | End: 2021-04-07
Payer: MEDICAID

## 2021-04-07 DIAGNOSIS — I10 ESSENTIAL (PRIMARY) HYPERTENSION: ICD-10-CM

## 2021-04-07 PROCEDURE — 93306 TTE W/DOPPLER COMPLETE: CPT

## 2021-04-07 PROCEDURE — 93306 TTE W/DOPPLER COMPLETE: CPT | Mod: 26

## 2021-04-07 NOTE — REASON FOR VISIT
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

## 2021-04-07 NOTE — ADDENDUM
[FreeTextEntry1] : ADDENDUM 4/7: Echocardiogram showed preserved EF, otherwise unremarkable. Patient is at low-intermediate risk of adverse cardiac events undergoing low-risk procedure. No further cardiac testing is needed prior to procedure, though a Holter is still needed at some point to assess PVC burden.

## 2021-04-07 NOTE — HISTORY OF PRESENT ILLNESS
[FreeTextEntry1] : This is a 66 yo male with history of NAFLD and PMH of HBV at 16yo (treated), who presents for pre-op evaluation for colonoscopy. He reports he is in his normal state of health and has no complaints. He denies chest pain, dyspnea, palpitations, lightheadedness, or syncope. He denies trouble climbing stairs or needing to stop while walking. He is a former smoker, last smoked 8 yrs ago, 1-1.5 packs/day. Family hx is significant for his father who had MI at age 85.

## 2021-04-07 NOTE — ASSESSMENT
[FreeTextEntry1] : This is a 66 yo male with history of NAFLD and PMH of HBV at 16yo (treated), who presents for pre-op evaluation for colonoscopy, found to have PVCs and possible inferior infarct of unknown chronicity. No acute cardiac contraindication for anesthesia, but deferred risk stratification pending further evaluation.\par \par Plan:\par 1. PVCs\par -Abnormal EKG today showing PVCs\par -Echo to evaluate for LVH, EF\par -24-hour Holter monitor to assess PVC burden\par \par 2. Possible inferior infarct of unknown chronicity\par -Abnormal EKG today\par -Echo as above to assess for wall motion abnormalities\par \par 3. HTN\par -Pt with hx of high BP measurements at previous office visits\par -Pt presents today with BP of 143/82\par -Start Amlodipine 5mg daily.  R/B/A discussed. \par \par 4. Hyperlipidemia\par -Pt with recently elevated LDL and triglycerides\par -Patient's 10-year risk of atherosclerotic cardiovascular disease (ASCVD) as estimated by the pooled cohort equations is 8.8%.\par -Start Atorvastatin 20mg qhs. R/B/A discussed. \par \par 5. Pre-op evaluation\par -Pt presents for pre-op clearance for colonoscopy\par -Defer risk stratification pending further evaluation

## 2021-04-19 ENCOUNTER — APPOINTMENT (OUTPATIENT)
Dept: INTERNAL MEDICINE | Facility: CLINIC | Age: 66
End: 2021-04-19
Payer: MEDICAID

## 2021-04-19 ENCOUNTER — APPOINTMENT (OUTPATIENT)
Dept: CARDIOLOGY | Facility: CLINIC | Age: 66
End: 2021-04-19

## 2021-04-19 VITALS
BODY MASS INDEX: 32.35 KG/M2 | WEIGHT: 226 LBS | HEIGHT: 70 IN | OXYGEN SATURATION: 96 % | SYSTOLIC BLOOD PRESSURE: 143 MMHG | HEART RATE: 93 BPM | TEMPERATURE: 97.8 F | DIASTOLIC BLOOD PRESSURE: 90 MMHG

## 2021-04-19 PROCEDURE — 99215 OFFICE O/P EST HI 40 MIN: CPT

## 2021-04-19 PROCEDURE — 99072 ADDL SUPL MATRL&STAF TM PHE: CPT

## 2021-04-20 LAB — SARS-COV-2 N GENE NPH QL NAA+PROBE: NOT DETECTED

## 2021-05-04 ENCOUNTER — APPOINTMENT (OUTPATIENT)
Dept: INTERNAL MEDICINE | Facility: CLINIC | Age: 66
End: 2021-05-04
Payer: MEDICAID

## 2021-05-04 VITALS
WEIGHT: 226 LBS | OXYGEN SATURATION: 97 % | HEIGHT: 70 IN | BODY MASS INDEX: 32.35 KG/M2 | TEMPERATURE: 97.7 F | DIASTOLIC BLOOD PRESSURE: 87 MMHG | HEART RATE: 83 BPM | SYSTOLIC BLOOD PRESSURE: 135 MMHG

## 2021-05-04 DIAGNOSIS — I49.1 ATRIAL PREMATURE DEPOLARIZATION: ICD-10-CM

## 2021-05-04 LAB
BASOPHILS # BLD AUTO: 0.08 K/UL
BASOPHILS NFR BLD AUTO: 0.8 %
EOSINOPHIL # BLD AUTO: 0.6 K/UL
EOSINOPHIL NFR BLD AUTO: 6.3 %
HCT VFR BLD CALC: 47.6 %
HGB BLD-MCNC: 16.1 G/DL
IMM GRANULOCYTES NFR BLD AUTO: 0.3 %
LYMPHOCYTES # BLD AUTO: 3.42 K/UL
LYMPHOCYTES NFR BLD AUTO: 36 %
MAN DIFF?: NORMAL
MCHC RBC-ENTMCNC: 31.4 PG
MCHC RBC-ENTMCNC: 33.8 GM/DL
MCV RBC AUTO: 92.8 FL
MONOCYTES # BLD AUTO: 0.92 K/UL
MONOCYTES NFR BLD AUTO: 9.7 %
NEUTROPHILS # BLD AUTO: 4.45 K/UL
NEUTROPHILS NFR BLD AUTO: 46.9 %
PLATELET # BLD AUTO: 184 K/UL
RBC # BLD: 5.13 M/UL
RBC # FLD: 12.3 %
WBC # FLD AUTO: 9.5 K/UL

## 2021-05-04 PROCEDURE — 99213 OFFICE O/P EST LOW 20 MIN: CPT | Mod: 25

## 2021-05-04 PROCEDURE — 99072 ADDL SUPL MATRL&STAF TM PHE: CPT

## 2021-05-04 PROCEDURE — 93000 ELECTROCARDIOGRAM COMPLETE: CPT

## 2021-05-04 NOTE — HISTORY OF PRESENT ILLNESS
[No Pertinent Pulmonary History] : no history of asthma, COPD, sleep apnea, or smoking [No Adverse Anesthesia Reaction] : no adverse anesthesia reaction in self or family member [(Patient denies any chest pain, claudication, dyspnea on exertion, orthopnea, palpitations or syncope)] : Patient denies any chest pain, claudication, dyspnea on exertion, orthopnea, palpitations or syncope [Other: _____] : [unfilled] [Chronic Anticoagulation] : no chronic anticoagulation [Chronic Kidney Disease] : no chronic kidney disease [Diabetes] : no diabetes [FreeTextEntry1] : colonoscopy  [FreeTextEntry2] : 5/7/21 [FreeTextEntry3] : [facility]  [FreeTextEntry4] : pt is a 66 yr old who has been seeing blood on the toilet tissue after a bm and is happening every 15 days. it is bright red blood. he is not anemic and had last colonoscopy in 2016 which revealed diffuse diverticulosis. \par he has been cardiac cleared by his cardiologist

## 2021-05-04 NOTE — PHYSICAL EXAM
Clothing [Well Developed] : well developed [Well Nourished] : well nourished [Sclera] : the sclera and conjunctiva were normal [PERRL With Normal Accommodation] : pupils were equal in size, round, and reactive to light [Oropharynx] : the oropharynx was normal [Rate ___] : at [unfilled] breaths per minute [Normal Rhythm/Effort] : normal respiratory rhythm and effort [Clear Bilaterally] : the lungs were clear to auscultation bilaterally [Normal to Percussion] : the lungs were normal to percussion [5th Left ICS - MCL] : palpated at the 5th LICS in the midclavicular line [Normal Rate] : normal [Rhythm Regular] : regular [No Murmur] : no murmurs heard [No Pitting Edema] : no pitting edema present [2+] : left 2+ [No Abnormalities] : the abdominal aorta was not enlarged and no bruit was heard [Full Pulse] : the pedal pulses are present [Edema] : there was no peripheral edema [Obese] : obese [Soft, Nontender] : the abdomen was soft and nontender [No Mass] : no masses were palpated [No HSM] : no hepatosplenomegaly noted [Cervical Lymph Nodes Enlarged Posterior Bilaterally] : posterior cervical [Cervical Lymph Nodes Enlarged Anterior Bilaterally] : anterior cervical [Supraclavicular Lymph Nodes Enlarged Bilaterally] : supraclavicular [Axillary Lymph Nodes Enlarged Bilaterally] : axillary [Femoral Lymph Nodes Enlarged Bilaterally] : femoral [Inguinal Lymph Nodes Enlarged Bilaterally] : inguinal [No CVA Tenderness] : no ~M costovertebral angle tenderness [No Spinal Tenderness] : no spinal tenderness [Normal Station and Gait] : the gait and station were normal [Normal] : motor strength was normal in all muscle groups [Normal Motor Tone] : the muscle tone was normal [Involuntary Movements] : no involuntary movements were seen [Skin Color & Pigmentation] : normal skin color and pigmentation [] : no rash [Skin Turgor] : normal skin turgor [Cranial Nerves] : cranial nerves 2-12 were intact [Motor Exam] : the motor exam was normal [Oriented To Time, Place, And Person] : oriented to person, place, and time [Impaired Insight] : insight and judgment were intact [Affect] : the affect was normal [Rt] : no varicose veins of the right leg [Right Carotid Bruit] : no bruit heard over the right carotid [Left Carotid Bruit] : no bruit heard over the left carotid [Right Femoral Bruit] : no bruit heard over the right femoral artery [Left Femoral Bruit] : no bruit heard over the left femoral artery [FreeTextEntry1] : homans negative bilateral  no calf tenderness or erythema

## 2021-05-04 NOTE — ASSESSMENT
[Modify medications prior to procedure] : Modify medications prior to procedure [As per surgery] : as per surgery [High Risk Surgery - Intraperitoneal, Intrathoracic or Supringuinal Vascular Procedures] : High Risk Surgery - Intraperitoneal, Intrathoracic or Supringuinal Vascular Procedures - No (0) [Ischemic Heart Disease] : Ischemic Heart Disease - No (0) [Congestive Heart Failure] : Congestive Heart Failure - No (0) [Prior Cerebrovascular Accident or TIA] : Prior Cerebrovascular Accident or TIA - No (0) [Creatinine >= 2mg/dL (1 Point)] : Creatinine >= 2mg/dL - No (0) [Insulin-dependent Diabetic (1 Point)] : Insulin-dependent Diabetic - No (0) [0] : 0 , RCRI Class: I, Risk of Post-Op Cardiac Complications: 3.9%, 95% CI for Risk Estimate: 2.8% - 5.4% [FreeTextEntry4] : \par Preop Pt ahs been cleared by his cardiologist and is having a  low riskl procedure . He was explained the prep , post procedure care , anesthesia , alternatives and risks and complications and all his questions were answered The risks, benefits, and alternatives were explained in detail to the patient. Risks including but not limited to bleeding, perforation, adverse reaction to medications, cardiopulmonary compromise and their attendant sequalae explained. Sequelae including but not limited to need for surgery, colostomy, prolonged hospital stay, placement of drainage tubes, blood transfusions, disability, morbidity and mortality was explained. \par It has been made clear to the patient that although colonoscopy is still considered the best test to screen for colon cancer and polyps, no test is 100% accurate. He/she indicated understanding of the above and wishes to proceed. \par All questions and concerns have been addressed. \par \par  During last  colon examination he developed bronchial spasm and needed nebulizer treatment post procedure.  \par \par \par \par \par 4/7: Echocardiogram showed preserved EF, otherwise unremarkable. Patient is at low-intermediate risk of adverse cardiac events undergoing low-risk procedure. No further cardiac testing is needed prior to procedure, though a Holter is still needed at some point to assess PVC burden. \par  \par Pt will have blood testing done and covid pcr testing  on tuesday the  week of procedure.   [FreeTextEntry7] : he will take with sip of water amlodipine 3hrs prior to procedure  he will take bp meds

## 2021-05-04 NOTE — PLAN
[FreeTextEntry1] : pt was exposed to  family members with covid one week ago  and he is to quarantine    and take covid pcr  3days after exposure and again to continue being monitored.  he has no fever chills diarrhea , headaches change in taste or  smell no sob or cough.   COVID19 Precautions:\par - Clean your hands often. Wash your hands often with soap and water for at least 20 seconds, especially after blowing your nose, coughing, or sneezing, or having been in a public place.\par - If soap and water are not available, use a hand  that contains at least 60% alcohol.\par - To the extent possible, avoid touching high-touch surfaces in public places - elevator buttons, door handles, handrails, handshaking with people, etc. Use a tissue or your sleeve to cover your hand or finger if you must touch something.\par - Wash your hands after touching surfaces in public places.\par - Avoid touching your face, nose, eyes, etc.\par - Clean and disinfect your home to remove germs: practice routine cleaning of frequently touched surfaces (for example: tables, doorknobs, light switches, handles, desks, toilets, faucets, sinks & cell phones)\par - Avoid crowds, especially in poorly ventilated spaces. Your risk of exposure to respiratory viruses like COVID-19 may increase in crowded, closed-in settings with little air circulation if there are people in the crowd who are sick. All patients are recommended to practice social distancing and stay at least 6 feet away from others.\par - Avoid all non-essential travel including plane trips, and especially avoid embarking on cruise ships.\par -If COVID-19 is spreading in your community, take extra measures to put distance between yourself and other people to further reduce your risk of being exposed to this new virus.\par -Stay home as much as possible.\par - Consider ways of getting food brought to your house through family, social, or commercial networks\par -Be aware that the virus has been known to live in the air up to 3 hours post exposure, cardboard up to 24 hours post exposure, copper up to 4 hours post exposure, steel and plastic up to 2-3 days post exposure. Risk of transmission from these surfaces are affected by many variables.\par Immune Support Recommendations:\par -OTC Vitamin C 500mg BID \par -OTC Quercetin 250-500mg BID \par -OTC Zinc 75-100mg per day \par -OTC Melatonin 1 or 2 mg a night \par -OTC Vitamin D 1-4000mg per day \par -OTC Tonic Water 8oz per day\par \par

## 2021-05-04 NOTE — PHYSICAL EXAM
[Well Developed] : well developed [Well Nourished] : well nourished [Normal Oropharynx] : the oropharynx was normal [Supple] : supple [Rate ___] : at [unfilled] breaths per minute [Normal Rhythm/Effort] : normal respiratory rhythm and effort [Clear Bilaterally] : the lungs were clear to auscultation bilaterally [Normal to Percussion] : the lungs were normal to percussion [Heart Rate ___] : [unfilled] bpm [Rhythm Regular] : regular [Normal Rate] : normal [Normal S1] : normal S1 [Normal S2] : normal S2 [No Murmur] : no murmurs heard [No Pitting Edema] : no pitting edema present [2+] : left 2+ [No Abnormalities] : the abdominal aorta was not enlarged and no bruit was heard [Soft, Nontender] : the abdomen was soft and nontender [No HSM] : no hepatosplenomegaly noted [No Mass] : no masses were palpated [None] : no CVA tenderness [Normal Station and Gait] : the gait and station were normal [Normal Motor Tone] : the muscle tone was normal [Involuntary Movements] : no involuntary movements were seen [Normal Scalp] : inspection of the scalp showed no abnormalities [Examination Of The Hair] : texture and distribution of hair was normal [Complexion Medium] : medium complexion [Normal] : affect was normal and insight and judgment were intact [S3] : no S3 [S4] : no S4 [Rt] : no varicose veins of the right leg [Lt] : no varicose veins of the left leg [Right Carotid Bruit] : no bruit heard over the right carotid [Left Carotid Bruit] : no bruit heard over the left carotid [Right Femoral Bruit] : no bruit heard over the right femoral artery [Left Femoral Bruit] : no bruit heard over the left femoral artery [Bruit] : no bruit heard [Cervical Lymph Nodes Enlarged Posterior Bilaterally] : nodes not enlarged [Supraclavicular Lymph Nodes Enlarged Bilaterally] : nodes not enlarged [Axillary Lymph Nodes Enlarged Bilaterally] : nodes not enlarged [Inguinal Lymph Nodes Enlarged Bilaterally] : nodes not enlarged [Abnormal Color] : normal color and pigmentation [Skin Lesions 1] : no skin lesions were observed [Skin Turgor Decreased] : normal skin turgor

## 2021-05-04 NOTE — HISTORY OF PRESENT ILLNESS
[Heartburn] : denies heartburn [Nausea] : denies nausea [Vomiting] : denies vomiting [Diarrhea] : denies diarrhea [Constipation] : denies constipation [Yellow Skin Or Eyes (Jaundice)] : denies jaundice [Abdominal Pain] : denies abdominal pain [Abdominal Swelling] : denies abdominal swelling [Rectal Pain] : denies rectal pain [de-identified] : Pt is to have colonoscopy for rectal bleeding and  feeling well no sob chest pain or  abd pain or dizziness.  he needs blood testing today.

## 2021-05-04 NOTE — RESULTS/DATA
[ECG Reviewed] : reviewed [No Acute Ischemia] : No acute ischemia [Ventricular Rate___] : ventricular rate is [unfilled] beats per minute [P Waves Normal] : the P wave is normal [IL Interval___] : [unfilled] seconds [QRS Interval___] : QRS interval: [unfilled] seconds [QTc Interval___] : QTc interval: [unfilled] [FreeTextEntry4] : occ pvc

## 2021-05-04 NOTE — ASSESSMENT
[FreeTextEntry1] :  rectal bleeding  WE discussed procedure and will return prior for blood testing and instruction.  Colon and rectal cancer screening is a way in which doctors check the colon and rectum for signs of cancer or growths (called polyps) that might become cancer. It is done in people who have no symptoms and no reason to think they have cancer. The goal is to find and remove polyps before they become cancer, or to find cancer early, before it grows, spreads, or causes problems.\par \par The colon and rectum are the last part of the digestive tract (figure 1). When doctors talk about colon and rectal cancer screening, they use the term "colorectal." That is just a shorter way of saying "colon and rectal." It's also possible to say just colon cancer screening.\par \par Studies show that having colon cancer screening lowers the chance of dying from colon cancer. There are several different types of screening test that can do this.\par \par What are the different screening tests for colon cancer? — They include:\par \par ?Colonoscopy – Colonoscopy allows the doctor to see directly inside the entire colon. Before you can have a colonoscopy, you must clean out your colon. You do this at home by drinking a special liquid that causes watery diarrhea for several hours. On the day of the test, you get medicine to help you relax. Then a doctor puts a thin tube into your anus and advances it into your colon (figure 2). The tube has a tiny camera attached to it, so the doctor can see inside your colon. The tube also has tiny tools on the end, so the doctor can remove pieces of tissue or polyps if they are there. After polyps or pieces of tissue are removed, they are sent to a lab to be checked for cancer.\par \par \par •Advantages of this test – Colonoscopy finds most small polyps and almost all large polyps and cancers. If found, polyps can be removed right away. This test gives the most accurate results. If any other screening tests are done first and come back positive, a colonoscopy will need to be done for follow-up. If you have a colonoscopy as your first test, you will probably not need a second follow-up test soon after.\par \par \par •Drawbacks to this test – Colonoscopy has some small risks. It can cause bleeding or tear the inside of the colon, but this only happens in 1 out of 1,000 people. Also, cleaning out the bowel beforehand can be unpleasant. Plus, people usually cannot work or drive for the rest of the day after the test, because of the relaxation medicine they must take during the test.\par \par \par Sigmoidoscopy – A sigmoidoscopy is similar to a colonoscopy. The difference is that this test looks only at the last part of the colon, and a colonoscopy looks at the whole colon. Before you have a sigmoidoscopy, you must clean out the lower part of your colon using an enema. This bowel cleaning is not as thorough or unpleasant as the one for colonoscopy. For this test, you do not need to take medicines to help you relax, so you can drive and work afterward if you want.\par \par \par •Advantages of this test – Sigmoidoscopy can find polyps and cancers in the rectum and the last part of the colon. If polyps are found, they can be removed right away.\par \par \par •Drawbacks to this test – In about 2 out of 10,000 people, sigmoidoscopy tears the inside of the colon. The test also can't find polyps or cancers that are in the part of the colon the test does not view (figure 3). If doctors find polyps or cancer during a sigmoidoscopy, they usually follow up with a colonoscopy.\par \par \par CT colonography (also known as virtual colonoscopy or CTC) – CTC looks for cancer and polyps using a special X-ray called a "CT scan." For most CTC tests, the preparation is the same as it is for colonoscopy.\par \par \par •Advantages of this test – CTC can find polyps and cancers in the whole colon without the need for medicines to relax.\par \par \par •Drawbacks to this test – If doctors find polyps or cancer with CTC, they usually follow up with a colonoscopy. CTC sometimes finds areas that look abnormal but that turn out to be healthy. This means that CTC can lead to tests and procedures you did not need. Plus, CTC exposes you to radiation. In most cases, the preparation needed to clean the bowel is the same as the one needed for a colonoscopy. The test is expensive, and some insurance companies might not cover this test for screening.\par \par \par Stool test for blood – "Stool" is another word for bowel movements. Stool tests most commonly check for blood in samples of stool. Cancers and polyps can bleed, and if they bleed around the time you do the stool test, then blood will show up on the test. The test can find even small amounts of blood that you can't see in your stool. Other less serious conditions can also cause small amounts of blood in the stool, and that will show up in this test. You will have to collect small samples from your bowel movements, which you will put in a special container you get from your doctor or nurse. Then you follow the instructions to mail the container out for the testing.\par \par \par •Advantages of this test – This test does not involve cleaning out the colon or having any procedures.\par \par \par •Drawbacks to this test – Stool tests are less likely to find polyps than other screening tests. These tests also often come up abnormal even in people who do not have cancer. If a stool test shows something abnormal, doctors usually follow up with a colonoscopy.\par \par \par Stool DNA test – The stool DNA test checks for genetic markers of cancer, as well as for signs of blood. For this test, you get a special kit in order to collect a whole bowel movement. Then you follow the instructions about how and where to ship it.\par \par \par •Advantages of this test – This test does not involve cleaning out the colon or having any procedures. When cancer is not present, it is less likely to be falsely abnormal than a stool test for blood. That means it leads to fewer unnecessary colonoscopies.\par \par \par •Drawbacks to this test – It might be unpleasant to collect and ship a whole bowel movement. If a DNA test shows something abnormal, doctors usually follow up with a colonoscopy.\par \par \par There is no blood test that most experts think is accurate enough to use for screening.\par \par How do I choose which test to have? — Work with your doctor or nurse to decide which test is best for you. Some doctors might choose to combine screening tests, for example, sigmoidoscopy plus stool testing for blood. Being screened–no matter how–is more important than which test you choose.\par \par Who should be screened for colon cancer? — Doctors recommend that most people begin having colon cancer screening at age 50. People who have an increased risk of getting colon cancer sometimes begin screening at a younger age. That might include people with a strong family history of colon cancer, and people with diseases of the colon called "Crohn's disease" and "ulcerative colitis."\par \par Most people can stop being screened around the age of 75, or at the latest 85.\par \par How often should I be screened? — That depends on your risk of colon cancer and which test you have. People who have a high risk of colon cancer often need to be tested more often and should have a colonoscopy.\par \par Most people are not at high risk, so they can choose one of these schedules:\par \par Colonoscopy every 10 years\par \par CT colonography (CTC) every 5 years\par \par Sigmoidoscopy every 5 to 10 years\par \par Stool testing for blood once a year\par \par Stool DNA testing every 3 years (but doctors are not yet sure of the best time frame)\par   The risks, benefits, and alternatives were explained in detail to the patient. Risks including but not limited to bleeding, perforation, adverse reaction to medications, cardiopulmonary compromise and their attendant sequalae explained. Sequelae including but not limited to need for surgery, colostomy, prolonged hospital stay, placement of drainage tubes, blood transfusions, disability, morbidity and mortality was explained. \par It has been made clear to the patient that although colonoscopy is still considered the best test to screen for colon cancer and polyps, no test is 100% accurate. He/she indicated understanding of the above and wishes to proceed. \par All questions and concerns have been addressed. \par \par \par He has been cleared by his cardiologist   \par 2 htn controlled continue medications.  \par  ekg  reviewed and put into preop chart.

## 2021-05-05 LAB
ALBUMIN SERPL ELPH-MCNC: 4.7 G/DL
ALP BLD-CCNC: 72 U/L
ALT SERPL-CCNC: 30 U/L
ANION GAP SERPL CALC-SCNC: 12 MMOL/L
APPEARANCE: CLEAR
AST SERPL-CCNC: 31 U/L
BILIRUB SERPL-MCNC: 0.9 MG/DL
BILIRUBIN URINE: NEGATIVE
BLOOD URINE: NEGATIVE
BUN SERPL-MCNC: 11 MG/DL
CALCIUM SERPL-MCNC: 9.7 MG/DL
CHLORIDE SERPL-SCNC: 106 MMOL/L
CO2 SERPL-SCNC: 24 MMOL/L
COLOR: NORMAL
CREAT SERPL-MCNC: 1 MG/DL
GLUCOSE QUALITATIVE U: NEGATIVE
GLUCOSE SERPL-MCNC: 89 MG/DL
KETONES URINE: NEGATIVE
LEUKOCYTE ESTERASE URINE: NEGATIVE
NITRITE URINE: NEGATIVE
PH URINE: 7
POTASSIUM SERPL-SCNC: 5 MMOL/L
PROT SERPL-MCNC: 7.4 G/DL
PROTEIN URINE: NEGATIVE
SARS-COV-2 N GENE NPH QL NAA+PROBE: NOT DETECTED
SODIUM SERPL-SCNC: 142 MMOL/L
SPECIFIC GRAVITY URINE: 1.01
UROBILINOGEN URINE: NORMAL

## 2021-05-07 ENCOUNTER — RESULT REVIEW (OUTPATIENT)
Age: 66
End: 2021-05-07

## 2021-05-07 ENCOUNTER — APPOINTMENT (OUTPATIENT)
Dept: INTERNAL MEDICINE | Facility: HOSPITAL | Age: 66
End: 2021-05-07

## 2021-05-07 ENCOUNTER — OUTPATIENT (OUTPATIENT)
Dept: OUTPATIENT SERVICES | Facility: HOSPITAL | Age: 66
LOS: 1 days | End: 2021-05-07
Payer: MEDICAID

## 2021-05-07 DIAGNOSIS — Z12.11 ENCOUNTER FOR SCREENING FOR MALIGNANT NEOPLASM OF COLON: ICD-10-CM

## 2021-05-07 PROCEDURE — 88305 TISSUE EXAM BY PATHOLOGIST: CPT

## 2021-05-07 PROCEDURE — 88305 TISSUE EXAM BY PATHOLOGIST: CPT | Mod: 26

## 2021-05-07 PROCEDURE — 45380 COLONOSCOPY AND BIOPSY: CPT

## 2021-05-07 NOTE — CHART NOTE - NSCHARTNOTEFT_GEN_A_CORE
START TIME:  10:29 am                         CECUM REACH TIME: 10:36 am              END TIME:    10:49 am                      WITHDRAWAL TIME;  13 mins  PREOPERATIVE DIAGNOSIS:  rectal bleeding  colon cancer screening  PROCEDURE PERFORMED:  Colonoscopy.    ENDOSCOPIST: Fei CORADO    INSTRUMENT USED:  # [id number]    ANESTHESIA:  MAC provided by ANESTHESIA    EXTENT OF EXAM:  To the terminal Ileum    PREPARATION: good     LIMITATIONS:  None.    INDICATIONS FOR EXAMINATION:      PROCEDURE IN DETAIL:  A physical examination was performed. Consent on chart.  The patient was connected to the appropriate monitoring devices and an IV was started. Continuous oxygen was provided via nasal cannula and intravenous sedation was administered in divided doses throughout the procedure.     After adequate sedation was achieved, the patient was placed in the left lateral decubitus position and a digital rectal exam was performed. A well-lubricated colonoscope was then inserted into the rectum and advanced under direct visualization to the level of the cecum. The cecum was identified by both visual and anatomic landmarks. A photograph was taken of the cecal cap, as well as the intussuscepting ileum. The scope was then fully withdrawn while examining the color, texture, anatomy and integrity of the mucosa from the cecum to the anal canal. The findings were consistent with normal colonic mucosa. The scope was completely retrieved upon exiting the anal canal and the procedure was terminated. The patient was then transferred to the recovery room in stable condition.  EBL: 0    PROCEDURE PERFORMED:  Total colonoscopy with  cold biopsy polypectomy.    DETAILS OF PROCEDURE:      RECTUM: Internal hemorrhoids grade 2 right posterior right anterior and left lateral positions    Rectosigmoid Normal   SIGMOID: diffuse diverticuli  varying in size wide mouth  to small   DESCENDING COLON:  diffuse diverticuli   SPLENIC flexure: diverticuli  TRANSVERSE COLON: diverticuli  small polyp seen and removed using jumbo cold forceps bx# 1  HEPATIC FLEXURE; normal   ASCENDING COLON: diverticuli     CECUM: normal   ILEOCECAL VALVE; normal   TERMINAL ILEUM: normal      IMPRESSION; colon polyp, diffuse diverticulosis, internal hemorrhoids   no mucosal bleeding or blood noted throughout the colon , no av malformations seen ,    RECOMMENDATIONS;  sitz baths , corteform insert one applicator full rectally for 10 days , stool softeners.  medicated tucks , high fiber diet  No strenuous activity or driving for the remainder of the day.  Light, bland diet for the remainder of the day.  You may resume regular diet and activities tomorrow.  Follow up with Dr. Cramer as scheduled.  hand out for diverticulosis  and hemorrhoids     ATTENDING; Fei B[place]INGH[place]

## 2021-05-11 LAB — SURGICAL PATHOLOGY STUDY: SIGNIFICANT CHANGE UP

## 2021-06-16 ENCOUNTER — APPOINTMENT (OUTPATIENT)
Dept: HEPATOLOGY | Facility: CLINIC | Age: 66
End: 2021-06-16
Payer: MEDICAID

## 2021-06-16 VITALS
DIASTOLIC BLOOD PRESSURE: 81 MMHG | HEART RATE: 61 BPM | OXYGEN SATURATION: 96 % | TEMPERATURE: 98.2 F | BODY MASS INDEX: 31.92 KG/M2 | RESPIRATION RATE: 16 BRPM | WEIGHT: 223 LBS | SYSTOLIC BLOOD PRESSURE: 157 MMHG | HEIGHT: 70 IN

## 2021-06-16 PROCEDURE — 99215 OFFICE O/P EST HI 40 MIN: CPT

## 2021-06-16 NOTE — PHYSICAL EXAM
[General Appearance - Alert] : alert [General Appearance - In No Acute Distress] : in no acute distress [General Appearance - Well Nourished] : well nourished [General Appearance - Well Developed] : well developed [General Appearance - Well-Appearing] : healthy appearing [Sclera] : the sclera and conjunctiva were normal [Oropharynx] : the oropharynx was normal [Neck Appearance] : the appearance of the neck was normal [] : no respiratory distress [Respiration, Rhythm And Depth] : normal respiratory rhythm and effort [Exaggerated Use Of Accessory Muscles For Inspiration] : no accessory muscle use [Auscultation Breath Sounds / Voice Sounds] : lungs were clear to auscultation bilaterally [Heart Rate And Rhythm] : heart rate was normal and rhythm regular [Heart Sounds] : normal S1 and S2 [Edema] : there was no peripheral edema [Obese] : obese [Normal] : normal [Soft, Nontender] : the abdomen was soft and nontender [No Mass] : no masses were palpated [No HSM] : no hepatosplenomegaly noted [None] : no CVA tenderness [Cervical Lymph Nodes Enlarged Posterior Bilaterally] : posterior cervical [Cervical Lymph Nodes Enlarged Anterior Bilaterally] : anterior cervical [Supraclavicular Lymph Nodes Enlarged Bilaterally] : supraclavicular [Axillary Lymph Nodes Enlarged Bilaterally] : axillary [Inguinal Lymph Nodes Enlarged Bilaterally] : inguinal [No CVA Tenderness] : no ~M costovertebral angle tenderness [No Spinal Tenderness] : no spinal tenderness [Abnormal Walk] : normal gait [Skin Color & Pigmentation] : normal skin color and pigmentation [Skin Turgor] : normal skin turgor [Oriented To Time, Place, And Person] : oriented to person, place, and time [Impaired Insight] : insight and judgment were intact [Mood] : the mood was normal [Affect] : the affect was normal [Scleral Icterus] : No Scleral Icterus [Abdominal  Ascites] : no ascites [Splenomegaly] : no splenomegaly [Liver Palpable ___ Finger Breadths Below Costal Margin] : was not palpable below costal margin [Asterixis] : no asterixis observed [Jaundice] : No jaundice [FreeTextEntry4] : Limited b/o obesity [Dilated Collat. Veins] : no collateral vein dilation [Striae] : no striae [Mccormick's] : a negative Mccormick's sign [Liver Tender To Palpation] : not tender [FreeTextEntry1] : Grossly intact

## 2021-06-16 NOTE — REVIEW OF SYSTEMS
[Constipation] : constipation [Heartburn] : heartburn [Joint Pain] : joint pain [Negative] : ENT [Recent Weight Gain (___ Lbs)] : recent [unfilled] ~Ulb weight gain [Fever] : no fever [Chills] : no chills [Feeling Tired] : not feeling tired [Recent Weight Loss (___ Lbs)] : no recent weight loss [Eye Pain] : no eye pain [Red Eyes] : eyes not red [Dry Eyes] : no dryness of the eyes [Chest Pain] : no chest pain [Palpitations] : no palpitations [Lower Ext Edema] : no extremity edema [Shortness Of Breath] : no shortness of breath [Wheezing] : no wheezing [Abdominal Pain] : no abdominal pain [Cough] : no cough [Vomiting] : no vomiting [Diarrhea] : no diarrhea [Melena] : no melena [Dysuria] : no dysuria [Joint Swelling] : no joint swelling [Limb Pain] : no limb pain [Limb Swelling] : no limb swelling [Itching] : no itching [Easy Bleeding] : no tendency for easy bleeding [Easy Bruising] : no tendency for easy bruising [FreeTextEntry7] : he denies nausea, hematemesis, rectal bleeding or jaundice

## 2021-06-16 NOTE — HISTORY OF PRESENT ILLNESS
[___ Month(s) Ago] : [unfilled] month(s) ago [None] : ~he/she~ had no significant interval events [Alcohol Abuse] : alcohol abuse [IV Drug Use] : no IV drug use [Tattoo] : no tattoos [Body Piercing] : no body piercing [Hemodialysis] : no hemodialysis [Transfusion before 1992] : no transfusion before 1992 [Transplant before 1992] : no transplant before 1992 [Occupational Exposure] : no occupational exposure [Cocaine Use] : no cocaine use [de-identified] : 66 year-old obese (BMI 32) man from [place] with history of HTN, dyslipidemia, obesity, BPH, Hx of chronic alcohol use (quit 2019), and chronic Hep B (reportedly acquired at age 14, when developed jaundice) is being followed for chronic Hep B and fatty liver due to MetS plus alcohol. \par \par He used to drink alcohol excessively for many years and has been drinking  3-4 beers daily on weekends, and 6-7 beers per day on weekends for years. He reportedly quit drinking in 2019. However, now reports again 3-4x a week, 3-4 beers on a occasion.\par \par Notably, patient`s brother  of alcohol liver disease and liver cancer at age of 64. \par \par Prior liver workup: anti-HAV IgG positive, anti-HBs negative, anti-HBc positive, Anti-HCV negative, TEN 1:80, SMA , and AMA were negative, A1AT was normal, iron panel was normal.\par \par 2018 abdominal US reported fatty infiltration of the liver, normal spleen or no ascites. normal GB and bile ducts. \par \par FibroScan 3/2019 reported CAP of 242, and kPa of 5.8 (S1, F0-F1).\par \par He lost follow up, was following with Dr. Benitez and now transferring care.

## 2021-06-16 NOTE — ASSESSMENT
[FreeTextEntry1] : 66 year-old obese (BMI 32) man from [place] with history of dyslipidemia,  and excessive drinking is being seen for chronic hepatitis B and fatty liver. He has been following with Dr. Benitez, last seen in 2019, lost follow up and now transferring care. \par \par He used to drink alcohol excessively for many years and has been drinking  3-4 beers daily on weekends, and 6-7 beers per day on weekends for years. He reportedly quit  in 2019. Patient has fatty liver likely due to the combination of metabolic risk factors and alcohol. \par Patient has chronic hepatitis B, eAb positive, with low HBV viremia of 1315 (11/2020), normal PLT, and no focal liver mass on abdominal US (3/2021), treatment naive. \par He had FibroScan in 2019 showing S1 (242 dB/m) steatosis and no fibrosis (F0-F1 5.8kPA). \par \par Chronic Hepatitis B, eAb pos, treatment naive\par - I have explained to him the natural history of chronic hepatitis B.\par - Recent LFTs, ALT 30 (5/2021) \par - Will get HBV DNA, but was < 2000 IU/ml in 11/2021, thus did not meet criteria for treatment (also get HBeAb, eAg)\par - Will get repeat FibroScan\par - C/w q 6 months HCC surveillance, will get AFP (was WNL in 11/2020), last US abd 3/2021 showing hepatic steatosis, no focal mass, next will be due 9/2021, ordered\par - Patient has been counseled on prevention of HBV transmission, including but not limited to: not sharing toothbrushes, razors, injection equipment, glucose testing equipment, covering open cuts and scratches, using barrier protection (if sexual partner not immune), testing household and sexual contacts and vaccinating if not immune, not donating blood, organs or sperm. \par \par Hepatic steatosis, likely due to metabolic risk factors and alcohol Hx\par - Will get FibroScan as above\par - US abd showing steatosis, LFTs WNL\par - Patient has been counseled on life style interventions, including but not limited to: weight loss (3-5% loss of body weight might improve fat in the liver, while 7-10% needed for potential improvement of other components, including inflammation and scarring of the liver, called fibrosis); healthy diet, avoiding added sugars, sodas, avoiding saturated fats, limiting sodium, avoiding alcohol; and on the importance of regular exercise (> 150 min/week moderate intensity aerobic exercise with at least 2x/week muscle strengthening or exercise as tolerated). \par - Will get Peth, but still reports 3-4xa week alcohol, strongly advised on strict, complete alcohol abstinency\par \par HCM\par - Hep A immune (2018)\par - HCV ab neg (2019)\par - HIV neg (2015)\par - COVID vaccine - not yet, f/u w PCP\par - colonoscopy recently, following w Dr. Cramer, reports hemorrhoids\par \par \par RTC 4 months (after US abd, will synchronize blood work and imaging and then q 6 month)

## 2021-06-16 NOTE — REASON FOR VISIT
[Follow-Up: _____] : a [unfilled] follow-up visit [Source: ______] : History obtained from [unfilled] [FreeTextEntry1] : Hep B, Fatty liver (MetS+alcohol)

## 2021-06-24 LAB
AFP-TM SERPL-MCNC: <1.8 NG/ML
HBV DNA # SERPL NAA+PROBE: 654 IU/ML
HBV E AB SER QL: REACTIVE
HBV E AG SER QL: NONREACTIVE
HEPB DNA PCR LOG: 2.82 LOG10IU/ML
PHOSPHATIDYETHANOL (PETH), WHOLE BLOOD: 147 NG/ML

## 2021-07-06 ENCOUNTER — APPOINTMENT (OUTPATIENT)
Dept: HEPATOLOGY | Facility: CLINIC | Age: 66
End: 2021-07-06
Payer: MEDICAID

## 2021-07-06 PROCEDURE — 91200 LIVER ELASTOGRAPHY: CPT

## 2021-07-14 ENCOUNTER — NON-APPOINTMENT (OUTPATIENT)
Age: 66
End: 2021-07-14

## 2021-07-14 VITALS — HEIGHT: 70 IN | BODY MASS INDEX: 31.92 KG/M2 | WEIGHT: 223 LBS

## 2021-07-14 NOTE — HISTORY OF PRESENT ILLNESS
[TextBox_13] : He denies hemoptysis, denies new cough, denies unexplained weight loss.\par He is [place] speaking and we communicated using [employer] ID#[id number].\par He is a former smoker with a 45 pack year smoking history (1.5PPD x 30 years).  He quit 9 years ago.  He is referred by Dr. Magaly Cramer.

## 2021-07-14 NOTE — PLAN
[FreeTextEntry1] : His LDCT is scheduled for 7/15/21 at the [facility] location.  He will follow up with Dr. Cramer for the results.

## 2021-07-15 ENCOUNTER — OUTPATIENT (OUTPATIENT)
Dept: OUTPATIENT SERVICES | Facility: HOSPITAL | Age: 66
LOS: 1 days | End: 2021-07-15
Payer: MEDICAID

## 2021-07-15 ENCOUNTER — APPOINTMENT (OUTPATIENT)
Dept: PULMONOLOGY | Facility: HOSPITAL | Age: 66
End: 2021-07-15
Payer: MEDICAID

## 2021-07-15 ENCOUNTER — APPOINTMENT (OUTPATIENT)
Dept: CT IMAGING | Facility: HOSPITAL | Age: 66
End: 2021-07-15
Payer: MEDICAID

## 2021-07-15 DIAGNOSIS — Z87.891 PERSONAL HISTORY OF NICOTINE DEPENDENCE: ICD-10-CM

## 2021-07-15 PROCEDURE — 71271 CT THORAX LUNG CANCER SCR C-: CPT

## 2021-07-15 PROCEDURE — 71271 CT THORAX LUNG CANCER SCR C-: CPT | Mod: 26

## 2021-07-15 PROCEDURE — G0296 VISIT TO DETERM LDCT ELIG: CPT

## 2021-07-21 ENCOUNTER — APPOINTMENT (OUTPATIENT)
Dept: UROLOGY | Facility: CLINIC | Age: 66
End: 2021-07-21
Payer: MEDICAID

## 2021-07-21 DIAGNOSIS — N40.0 BENIGN PROSTATIC HYPERPLASIA WITHOUT LOWER URINARY TRACT SYMPMS: ICD-10-CM

## 2021-07-21 PROCEDURE — 99213 OFFICE O/P EST LOW 20 MIN: CPT

## 2021-07-21 NOTE — ASSESSMENT
[FreeTextEntry1] : Very pleasant 66-year-old gentleman who presents for follow-up for BPH, screening for prostate cancer\par -PSA last year 0.88\par -Discussed that his risk for prostate cancer is low, given his negative CUONG and low PSA\par -Urinalysis reviewed demonstrating no evidence of microscopic hematuria or urinary tract infection\par -We will defer medications at this time as he is happy with his urinary symptoms\par -Follow-up in 1 year with PSA and CUONG

## 2021-07-21 NOTE — HISTORY OF PRESENT ILLNESS
[FreeTextEntry1] : Very pleasant 66-year-old gentleman who presents for follow-up of BPH and screening for prostate cancer.  Last year PSA was 0.88.  Urinalysis from May 2021 demonstrated no evidence of urinary tract infection nor blood in the urine.  He denies dysuria.  No flank pain or suprapubic pain.  Nocturia x2-3 per night.  His urinary symptoms are not bothersome to him.  No other complaints.

## 2021-08-16 ENCOUNTER — RX RENEWAL (OUTPATIENT)
Age: 66
End: 2021-08-16

## 2021-08-19 ENCOUNTER — NON-APPOINTMENT (OUTPATIENT)
Age: 66
End: 2021-08-19

## 2021-08-19 ENCOUNTER — APPOINTMENT (OUTPATIENT)
Dept: INTERNAL MEDICINE | Facility: CLINIC | Age: 66
End: 2021-08-19
Payer: MEDICAID

## 2021-08-19 VITALS
HEART RATE: 66 BPM | DIASTOLIC BLOOD PRESSURE: 82 MMHG | WEIGHT: 219 LBS | OXYGEN SATURATION: 95 % | SYSTOLIC BLOOD PRESSURE: 137 MMHG | TEMPERATURE: 97.3 F | HEIGHT: 70 IN | RESPIRATION RATE: 16 BRPM | BODY MASS INDEX: 31.35 KG/M2

## 2021-08-19 PROCEDURE — 99213 OFFICE O/P EST LOW 20 MIN: CPT

## 2021-08-19 NOTE — HEALTH RISK ASSESSMENT
[No] : In the past 12 months have you used drugs other than those required for medical reasons? No [No falls in past year] : Patient reported no falls in the past year [0] : 2) Feeling down, depressed, or hopeless: Not at all (0) [] : No [de-identified] : GI/URO/PULM/HEP [[id number]] : 0

## 2021-08-19 NOTE — ASSESSMENT
[FreeTextEntry1] : 66 year old male found to have stable Essential Hypertension, Hypercholesterolemia, Chronic Hepatitis B, Vitamin D Deficiency, NAFLD,with the current prescription regimen as recommended, diet and life style modifications, as counseled. Prior results reviewed, interpreted and discussed with the patient during today's examination, as appropriate. Follow up, treatment plan and tests, as ordered.\par \par Total time spent : 25 minutes\par Including:\par Preparation prior to visit - Reviewing prior record, results of tests and Consultation Reports as applicable\par Conducting an appropriate H & P during today's encounter\par Appropriate orders for tests, medications and procedures, as applicable\par Counseling patient \par Note completion\par

## 2021-08-19 NOTE — HISTORY OF PRESENT ILLNESS
[de-identified] : 66 year old  male patient with history of stable Essential Hypertension, Hypercholesterolemia, Chronic Hepatitis B, Vitamin D Deficiency, NAFLD, history as stated, presented for follow up examination. Patient is compliant with all medications. Denies shortness of breath, chest pain or abdominal pains at this time. ROS as stated.\par

## 2021-08-20 LAB
ALBUMIN SERPL ELPH-MCNC: 4.9 G/DL
ALP BLD-CCNC: 79 U/L
ALT SERPL-CCNC: 38 U/L
ANION GAP SERPL CALC-SCNC: 14 MMOL/L
AST SERPL-CCNC: 37 U/L
BASOPHILS # BLD AUTO: 0.08 K/UL
BASOPHILS NFR BLD AUTO: 0.8 %
BILIRUB SERPL-MCNC: 1.4 MG/DL
BUN SERPL-MCNC: 11 MG/DL
CALCIUM SERPL-MCNC: 9.9 MG/DL
CHLORIDE SERPL-SCNC: 105 MMOL/L
CHOLEST SERPL-MCNC: 156 MG/DL
CO2 SERPL-SCNC: 24 MMOL/L
CREAT SERPL-MCNC: 0.96 MG/DL
EOSINOPHIL # BLD AUTO: 0.47 K/UL
EOSINOPHIL NFR BLD AUTO: 4.8 %
ESTIMATED AVERAGE GLUCOSE: 105 MG/DL
GGT SERPL-CCNC: 39 U/L
GLUCOSE SERPL-MCNC: 80 MG/DL
HBA1C MFR BLD HPLC: 5.3 %
HCT VFR BLD CALC: 48.2 %
HDLC SERPL-MCNC: 47 MG/DL
HGB BLD-MCNC: 16.2 G/DL
IMM GRANULOCYTES NFR BLD AUTO: 0.3 %
LDLC SERPL CALC-MCNC: 93 MG/DL
LYMPHOCYTES # BLD AUTO: 3.11 K/UL
LYMPHOCYTES NFR BLD AUTO: 31.5 %
MAN DIFF?: NORMAL
MCHC RBC-ENTMCNC: 32 PG
MCHC RBC-ENTMCNC: 33.6 GM/DL
MCV RBC AUTO: 95.3 FL
MONOCYTES # BLD AUTO: 0.96 K/UL
MONOCYTES NFR BLD AUTO: 9.7 %
NEUTROPHILS # BLD AUTO: 5.23 K/UL
NEUTROPHILS NFR BLD AUTO: 52.9 %
NONHDLC SERPL-MCNC: 109 MG/DL
PLATELET # BLD AUTO: 189 K/UL
POTASSIUM SERPL-SCNC: 4.1 MMOL/L
PROT SERPL-MCNC: 7.6 G/DL
RBC # BLD: 5.06 M/UL
RBC # FLD: 12.4 %
SODIUM SERPL-SCNC: 143 MMOL/L
TRIGL SERPL-MCNC: 80 MG/DL
WBC # FLD AUTO: 9.88 K/UL

## 2021-09-04 ENCOUNTER — RX RENEWAL (OUTPATIENT)
Age: 66
End: 2021-09-04

## 2021-11-24 ENCOUNTER — APPOINTMENT (OUTPATIENT)
Dept: HEPATOLOGY | Facility: CLINIC | Age: 66
End: 2021-11-24
Payer: MEDICAID

## 2021-11-24 VITALS
RESPIRATION RATE: 16 BRPM | HEART RATE: 75 BPM | WEIGHT: 222 LBS | DIASTOLIC BLOOD PRESSURE: 83 MMHG | BODY MASS INDEX: 31.78 KG/M2 | HEIGHT: 70 IN | TEMPERATURE: 98 F | SYSTOLIC BLOOD PRESSURE: 144 MMHG | OXYGEN SATURATION: 100 %

## 2021-11-24 LAB
AFP-TM SERPL-MCNC: 1.8 NG/ML
ALBUMIN SERPL ELPH-MCNC: 4.9 G/DL
ALP BLD-CCNC: 78 U/L
ALT SERPL-CCNC: 33 U/L
ANION GAP SERPL CALC-SCNC: 15 MMOL/L
AST SERPL-CCNC: 31 U/L
BASOPHILS # BLD AUTO: 0.08 K/UL
BASOPHILS NFR BLD AUTO: 0.9 %
BILIRUB DIRECT SERPL-MCNC: 0.3 MG/DL
BILIRUB INDIRECT SERPL-MCNC: 0.9 MG/DL
BILIRUB SERPL-MCNC: 1.2 MG/DL
BUN SERPL-MCNC: 13 MG/DL
CALCIUM SERPL-MCNC: 10 MG/DL
CHLORIDE SERPL-SCNC: 102 MMOL/L
CO2 SERPL-SCNC: 24 MMOL/L
CREAT SERPL-MCNC: 0.94 MG/DL
EOSINOPHIL # BLD AUTO: 0.5 K/UL
EOSINOPHIL NFR BLD AUTO: 5.4 %
GLUCOSE SERPL-MCNC: 96 MG/DL
HCT VFR BLD CALC: 48.9 %
HGB BLD-MCNC: 15.8 G/DL
IMM GRANULOCYTES NFR BLD AUTO: 0.3 %
INR PPP: 1.05 RATIO
LYMPHOCYTES # BLD AUTO: 3.11 K/UL
LYMPHOCYTES NFR BLD AUTO: 33.8 %
MAN DIFF?: NORMAL
MCHC RBC-ENTMCNC: 30.6 PG
MCHC RBC-ENTMCNC: 32.3 GM/DL
MCV RBC AUTO: 94.8 FL
MONOCYTES # BLD AUTO: 0.8 K/UL
MONOCYTES NFR BLD AUTO: 8.7 %
NEUTROPHILS # BLD AUTO: 4.69 K/UL
NEUTROPHILS NFR BLD AUTO: 50.9 %
PLATELET # BLD AUTO: 188 K/UL
POTASSIUM SERPL-SCNC: 4.3 MMOL/L
PROT SERPL-MCNC: 7.7 G/DL
PT BLD: 12.4 SEC
RBC # BLD: 5.16 M/UL
RBC # FLD: 12.2 %
SODIUM SERPL-SCNC: 141 MMOL/L
WBC # FLD AUTO: 9.21 K/UL

## 2021-11-24 PROCEDURE — 99214 OFFICE O/P EST MOD 30 MIN: CPT

## 2021-11-26 LAB — HBV SURFACE AG SER QL: REACTIVE

## 2021-11-26 NOTE — HISTORY OF PRESENT ILLNESS
[___ Month(s) Ago] : [unfilled] month(s) ago [None] : ~he/she~ had no significant interval events [Alcohol Abuse] : alcohol abuse [IV Drug Use] : no IV drug use [Tattoo] : no tattoos [Body Piercing] : no body piercing [Hemodialysis] : no hemodialysis [Transfusion before 1992] : no transfusion before 1992 [Transplant before 1992] : no transplant before 1992 [Occupational Exposure] : no occupational exposure [Cocaine Use] : no cocaine use [de-identified] : 66 year-old obese (BMI 32) man from [place] with history of HTN, dyslipidemia, obesity, BPH, Hx of chronic alcohol use (quit 2019), and chronic Hep B (reportedly acquired at age 14, when developed jaundice) is being followed for chronic Hep B and fatty liver due to MetS plus alcohol. \par \par He used to drink alcohol excessively for many years and has been drinking  3-4 beers daily on weekends, and 6-7 beers per day on weekends for years. He reportedly quit drinking in 2019. However, now reports again 3-4x a week, 3-4 beers on a occasion.\par \par Notably, patient`s brother  of alcohol liver disease and liver cancer at age of 64. \par \par Prior liver workup: anti-HAV IgG positive, anti-HBs negative, anti-HBc positive, Anti-HCV negative, TEN 1:80, SMA , and AMA were negative, A1AT was normal, iron panel was normal.\par \par 2018 abdominal US reported fatty infiltration of the liver, normal spleen or no ascites. normal GB and bile ducts. \par \par FibroScan 3/2019 reported CAP of 242, and kPa of 5.8 (S1, F0-F1).\par \par He lost follow up, was following with Dr. Benitez and transferred care in 2021.\par He is here for follow up. Denies complaints.  Still has occasional alcohol use.

## 2021-11-26 NOTE — PHYSICAL EXAM
[General Appearance - Alert] : alert [General Appearance - In No Acute Distress] : in no acute distress [General Appearance - Well Nourished] : well nourished [General Appearance - Well Developed] : well developed [General Appearance - Well-Appearing] : healthy appearing [Sclera] : the sclera and conjunctiva were normal [Oropharynx] : the oropharynx was normal [Neck Appearance] : the appearance of the neck was normal [] : no respiratory distress [Respiration, Rhythm And Depth] : normal respiratory rhythm and effort [Exaggerated Use Of Accessory Muscles For Inspiration] : no accessory muscle use [Auscultation Breath Sounds / Voice Sounds] : lungs were clear to auscultation bilaterally [Heart Rate And Rhythm] : heart rate was normal and rhythm regular [Heart Sounds] : normal S1 and S2 [Edema] : there was no peripheral edema [Cervical Lymph Nodes Enlarged Posterior Bilaterally] : posterior cervical [Cervical Lymph Nodes Enlarged Anterior Bilaterally] : anterior cervical [Supraclavicular Lymph Nodes Enlarged Bilaterally] : supraclavicular [Axillary Lymph Nodes Enlarged Bilaterally] : axillary [Inguinal Lymph Nodes Enlarged Bilaterally] : inguinal [No CVA Tenderness] : no ~M costovertebral angle tenderness [No Spinal Tenderness] : no spinal tenderness [Abnormal Walk] : normal gait [Skin Color & Pigmentation] : normal skin color and pigmentation [Skin Turgor] : normal skin turgor [Oriented To Time, Place, And Person] : oriented to person, place, and time [Impaired Insight] : insight and judgment were intact [Affect] : the affect was normal [Mood] : the mood was normal [Obese] : obese [Normal] : normal [Soft, Nontender] : the abdomen was soft and nontender [No Mass] : no masses were palpated [No HSM] : no hepatosplenomegaly noted [None] : no CVA tenderness [Scleral Icterus] : No Scleral Icterus [Hepatojugular Reflux] : patient did not have a sustained hepatojugular reflux [Abdominal  Ascites] : no ascites [Splenomegaly] : no splenomegaly [Liver Palpable ___ Finger Breadths Below Costal Margin] : was not palpable below costal margin [Asterixis] : no asterixis observed [Jaundice] : No jaundice [FreeTextEntry4] : Limited b/o obesity [FreeTextEntry1] : Grossly intact [Dilated Collat. Veins] : no collateral vein dilation [Striae] : no striae [Firm] : not firm [Rigid] : not rigid [Rebound] : no rebound [Guarding] : no guarding [Mccormick's] : a negative Mccormick's sign [Liver Tender To Palpation] : not tender

## 2021-11-26 NOTE — REASON FOR VISIT
[Source: ______] : History obtained from [unfilled] [Follow-Up: _____] : a [unfilled] follow-up visit [FreeTextEntry1] : Hep B

## 2021-11-26 NOTE — REVIEW OF SYSTEMS
[Recent Weight Gain (___ Lbs)] : recent [unfilled] ~Ulb weight gain [Constipation] : constipation [Heartburn] : heartburn [Negative] : ENT [Fever] : no fever [Chills] : no chills [Feeling Tired] : not feeling tired [Recent Weight Loss (___ Lbs)] : no recent weight loss [Eye Pain] : no eye pain [Red Eyes] : eyes not red [Dry Eyes] : no dryness of the eyes [Chest Pain] : no chest pain [Palpitations] : no palpitations [Lower Ext Edema] : no extremity edema [Shortness Of Breath] : no shortness of breath [Wheezing] : no wheezing [Cough] : no cough [Abdominal Pain] : no abdominal pain [Vomiting] : no vomiting [Diarrhea] : no diarrhea [Melena] : no melena [Dysuria] : no dysuria [Joint Swelling] : no joint swelling [Limb Pain] : no limb pain [Limb Swelling] : no limb swelling [Itching] : no itching [Easy Bleeding] : no tendency for easy bleeding [Easy Bruising] : no tendency for easy bruising [FreeTextEntry7] : he denies nausea, hematemesis, rectal bleeding or jaundice

## 2021-11-29 LAB
HBV DNA # SERPL NAA+PROBE: 685 IU/ML
HEPB DNA PCR LOG: 2.84 LOG10IU/ML

## 2021-12-10 LAB — PHOSPHATIDYETHANOL (PETH), WHOLE BLOOD: 169 NG/ML

## 2021-12-14 ENCOUNTER — APPOINTMENT (OUTPATIENT)
Dept: HEPATOLOGY | Facility: CLINIC | Age: 66
End: 2021-12-14
Payer: MEDICAID

## 2021-12-14 PROCEDURE — 91200 LIVER ELASTOGRAPHY: CPT

## 2021-12-20 ENCOUNTER — RX RENEWAL (OUTPATIENT)
Age: 66
End: 2021-12-20

## 2022-02-18 ENCOUNTER — RX RENEWAL (OUTPATIENT)
Age: 67
End: 2022-02-18

## 2022-02-22 ENCOUNTER — APPOINTMENT (OUTPATIENT)
Dept: INTERNAL MEDICINE | Facility: CLINIC | Age: 67
End: 2022-02-22
Payer: MEDICAID

## 2022-02-22 ENCOUNTER — RX RENEWAL (OUTPATIENT)
Age: 67
End: 2022-02-22

## 2022-02-22 VITALS
BODY MASS INDEX: 31.35 KG/M2 | OXYGEN SATURATION: 97 % | DIASTOLIC BLOOD PRESSURE: 69 MMHG | WEIGHT: 219 LBS | SYSTOLIC BLOOD PRESSURE: 134 MMHG | HEART RATE: 53 BPM | HEIGHT: 70 IN | TEMPERATURE: 98 F | RESPIRATION RATE: 16 BRPM

## 2022-02-22 DIAGNOSIS — Z87.891 PERSONAL HISTORY OF NICOTINE DEPENDENCE: ICD-10-CM

## 2022-02-22 PROCEDURE — 99214 OFFICE O/P EST MOD 30 MIN: CPT

## 2022-02-22 NOTE — HEALTH RISK ASSESSMENT
[Former] : Former [No] : In the past 12 months have you used drugs other than those required for medical reasons? No [No falls in past year] : Patient reported no falls in the past year [0] : 2) Feeling down, depressed, or hopeless: Not at all (0) [de-identified] : HEP [[id number]] : 0

## 2022-02-22 NOTE — ASSESSMENT
I was notified by RN in the PP unit that patient was leaving the unit. Patient refused to signed AMA form, and was physiclaly leaving the building as I was on the phone with RN. Asked whether I had enough time to run up to talk to the patient, and was informed that patient was physically out of the door. Infant is in the NICU.  MD Ada MPH   [FreeTextEntry1] : 66 year old male found to have stable Essential Hypertension, Hypercholesterolemia, Chronic Hepatitis B, Vitamin D Deficiency, NAFLD, Cardiac Arrhythmia, with the current prescription regimen as recommended, diet and life style modifications, as counseled. Prior results reviewed, interpreted and discussed with the patient during today's examination, as appropriate. Follow up, treatment plan and tests, as ordered.\par \par Total time spent : 30 minutes\par Including:\par Preparation prior to visit - Reviewing prior record, results of tests and Consultation Reports as applicable\par Conducting an appropriate H & P during today's encounter\par Appropriate orders for tests, medications and procedures, as applicable\par Counseling patient \par Note completion\par

## 2022-02-22 NOTE — HISTORY OF PRESENT ILLNESS
[de-identified] : 66 year old  male patient with history of stable Essential Hypertension, Hypercholesterolemia, Chronic Hepatitis B, Vitamin D Deficiency, NAFLD, history as stated, presented for follow up examination. Patient is compliant with all medications. Denies shortness of breath, chest pain or abdominal pains at this time. ROS as stated.\par

## 2022-02-23 LAB
25(OH)D3 SERPL-MCNC: 43.3 NG/ML
ALBUMIN SERPL ELPH-MCNC: 4.8 G/DL
ALP BLD-CCNC: 73 U/L
ALT SERPL-CCNC: 27 U/L
ANION GAP SERPL CALC-SCNC: 14 MMOL/L
APPEARANCE: CLEAR
AST SERPL-CCNC: 29 U/L
BACTERIA: NEGATIVE
BASOPHILS # BLD AUTO: 0.07 K/UL
BASOPHILS NFR BLD AUTO: 0.8 %
BILIRUB SERPL-MCNC: 1.2 MG/DL
BILIRUBIN URINE: NEGATIVE
BLOOD URINE: NEGATIVE
BUN SERPL-MCNC: 10 MG/DL
CALCIUM SERPL-MCNC: 10 MG/DL
CHLORIDE SERPL-SCNC: 106 MMOL/L
CHOLEST SERPL-MCNC: 157 MG/DL
CO2 SERPL-SCNC: 22 MMOL/L
COLOR: NORMAL
CREAT SERPL-MCNC: 0.93 MG/DL
CREAT SPEC-SCNC: 81 MG/DL
EOSINOPHIL # BLD AUTO: 0.58 K/UL
EOSINOPHIL NFR BLD AUTO: 6.7 %
ESTIMATED AVERAGE GLUCOSE: 103 MG/DL
GGT SERPL-CCNC: 32 U/L
GLUCOSE QUALITATIVE U: NEGATIVE
GLUCOSE SERPL-MCNC: 93 MG/DL
HBA1C MFR BLD HPLC: 5.2 %
HCT VFR BLD CALC: 48.2 %
HDLC SERPL-MCNC: 47 MG/DL
HGB BLD-MCNC: 15.4 G/DL
HYALINE CASTS: 0 /LPF
IMM GRANULOCYTES NFR BLD AUTO: 0.3 %
KETONES URINE: NEGATIVE
LDLC SERPL CALC-MCNC: 94 MG/DL
LEUKOCYTE ESTERASE URINE: NEGATIVE
LYMPHOCYTES # BLD AUTO: 2.94 K/UL
LYMPHOCYTES NFR BLD AUTO: 33.8 %
MAN DIFF?: NORMAL
MCHC RBC-ENTMCNC: 31.2 PG
MCHC RBC-ENTMCNC: 32 GM/DL
MCV RBC AUTO: 97.8 FL
MICROALBUMIN 24H UR DL<=1MG/L-MCNC: <1.2 MG/DL
MICROALBUMIN/CREAT 24H UR-RTO: NORMAL MG/G
MICROSCOPIC-UA: NORMAL
MONOCYTES # BLD AUTO: 0.85 K/UL
MONOCYTES NFR BLD AUTO: 9.8 %
NEUTROPHILS # BLD AUTO: 4.22 K/UL
NEUTROPHILS NFR BLD AUTO: 48.6 %
NITRITE URINE: NEGATIVE
NONHDLC SERPL-MCNC: 109 MG/DL
PH URINE: 6.5
PLATELET # BLD AUTO: 177 K/UL
POTASSIUM SERPL-SCNC: 5.1 MMOL/L
PROT SERPL-MCNC: 7.4 G/DL
PROTEIN URINE: NEGATIVE
PSA FREE FLD-MCNC: 34 %
PSA FREE SERPL-MCNC: 0.26 NG/ML
PSA SERPL-MCNC: 0.78 NG/ML
RBC # BLD: 4.93 M/UL
RBC # FLD: 12.8 %
RED BLOOD CELLS URINE: 1 /HPF
SODIUM SERPL-SCNC: 143 MMOL/L
SPECIFIC GRAVITY URINE: 1.01
SQUAMOUS EPITHELIAL CELLS: 0 /HPF
TRIGL SERPL-MCNC: 75 MG/DL
TSH SERPL-ACNC: 2.74 UIU/ML
UROBILINOGEN URINE: NORMAL
WBC # FLD AUTO: 8.69 K/UL
WHITE BLOOD CELLS URINE: 0 /HPF

## 2022-02-25 LAB — HEMOCCULT STL QL IA: NEGATIVE

## 2022-04-20 ENCOUNTER — RX RENEWAL (OUTPATIENT)
Age: 67
End: 2022-04-20

## 2022-05-17 ENCOUNTER — RX RENEWAL (OUTPATIENT)
Age: 67
End: 2022-05-17

## 2022-05-25 ENCOUNTER — APPOINTMENT (OUTPATIENT)
Dept: HEPATOLOGY | Facility: CLINIC | Age: 67
End: 2022-05-25
Payer: MEDICARE

## 2022-05-25 VITALS
RESPIRATION RATE: 16 BRPM | WEIGHT: 217 LBS | HEART RATE: 57 BPM | HEIGHT: 70 IN | DIASTOLIC BLOOD PRESSURE: 75 MMHG | BODY MASS INDEX: 31.07 KG/M2 | SYSTOLIC BLOOD PRESSURE: 130 MMHG | OXYGEN SATURATION: 94 % | TEMPERATURE: 97.9 F

## 2022-05-25 DIAGNOSIS — Z78.9 OTHER SPECIFIED HEALTH STATUS: ICD-10-CM

## 2022-05-25 PROCEDURE — 99214 OFFICE O/P EST MOD 30 MIN: CPT

## 2022-05-30 LAB
AFP-TM SERPL-MCNC: <1.8 NG/ML
ALBUMIN SERPL ELPH-MCNC: 4.5 G/DL
ALP BLD-CCNC: 71 U/L
ALT SERPL-CCNC: 39 U/L
ANION GAP SERPL CALC-SCNC: 14 MMOL/L
APPEARANCE: CLEAR
AST SERPL-CCNC: 40 U/L
BACTERIA: NEGATIVE
BASOPHILS # BLD AUTO: 0.08 K/UL
BASOPHILS NFR BLD AUTO: 0.9 %
BILIRUB DIRECT SERPL-MCNC: 0.2 MG/DL
BILIRUB INDIRECT SERPL-MCNC: 0.8 MG/DL
BILIRUB SERPL-MCNC: 1 MG/DL
BILIRUBIN URINE: NEGATIVE
BLOOD URINE: NEGATIVE
BUN SERPL-MCNC: 11 MG/DL
CALCIUM SERPL-MCNC: 9.6 MG/DL
CHLORIDE SERPL-SCNC: 102 MMOL/L
CO2 SERPL-SCNC: 26 MMOL/L
COLOR: NORMAL
CREAT SERPL-MCNC: 0.96 MG/DL
EGFR: 88 ML/MIN/1.73M2
EOSINOPHIL # BLD AUTO: 0.52 K/UL
EOSINOPHIL NFR BLD AUTO: 6 %
FERRITIN SERPL-MCNC: 135 NG/ML
GLUCOSE QUALITATIVE U: NEGATIVE
GLUCOSE SERPL-MCNC: 88 MG/DL
HBV DNA # SERPL NAA+PROBE: 346 IU/ML
HBV E AB SER QL: REACTIVE
HBV E AG SER QL: NONREACTIVE
HBV SURFACE AB SER QL: NONREACTIVE
HBV SURFACE AG SER QL: REACTIVE
HCT VFR BLD CALC: 46.4 %
HEPB DNA PCR INT: DETECTED
HEPB DNA PCR LOG: 2.54 LOGIU/ML
HGB BLD-MCNC: 15.5 G/DL
HYALINE CASTS: 0 /LPF
IMM GRANULOCYTES NFR BLD AUTO: 0.2 %
INR PPP: 1.05 RATIO
IRON SATN MFR SERPL: 27 %
IRON SERPL-MCNC: 83 UG/DL
KETONES URINE: NEGATIVE
LEUKOCYTE ESTERASE URINE: NEGATIVE
LYMPHOCYTES # BLD AUTO: 2.17 K/UL
LYMPHOCYTES NFR BLD AUTO: 24.9 %
MAN DIFF?: NORMAL
MCHC RBC-ENTMCNC: 30.8 PG
MCHC RBC-ENTMCNC: 33.4 GM/DL
MCV RBC AUTO: 92.1 FL
MICROSCOPIC-UA: NORMAL
MONOCYTES # BLD AUTO: 0.82 K/UL
MONOCYTES NFR BLD AUTO: 9.4 %
NEUTROPHILS # BLD AUTO: 5.09 K/UL
NEUTROPHILS NFR BLD AUTO: 58.6 %
NITRITE URINE: NEGATIVE
PH URINE: 7
PLATELET # BLD AUTO: 174 K/UL
POTASSIUM SERPL-SCNC: 4.5 MMOL/L
PROT SERPL-MCNC: 7.1 G/DL
PROTEIN URINE: NEGATIVE
PT BLD: 12.2 SEC
RBC # BLD: 5.04 M/UL
RBC # FLD: 12.8 %
RED BLOOD CELLS URINE: 7 /HPF
SODIUM SERPL-SCNC: 142 MMOL/L
SPECIFIC GRAVITY URINE: 1.01
SQUAMOUS EPITHELIAL CELLS: 0 /HPF
TIBC SERPL-MCNC: 313 UG/DL
UIBC SERPL-MCNC: 229 UG/DL
UROBILINOGEN URINE: NORMAL
WBC # FLD AUTO: 8.7 K/UL
WHITE BLOOD CELLS URINE: 0 /HPF

## 2022-05-30 NOTE — ASSESSMENT
[FreeTextEntry1] : 67 year-old obese (BMI 32) man from [place] with history of dyslipidemia,  and excessive drinking is being seen for chronic hepatitis B and fatty liver. He has been following with Dr. Benitez, last seen in 2019, lost follow up and transferred care in 6/2021. \par \par He used to drink alcohol excessively for many years and has been drinking  3-4 beers daily on weekends, and 6-7 beers per day on weekends for years. He reportedly quit  in 2019. Patient has fatty liver likely due to the combination of metabolic risk factors and alcohol. \par Patient also has chronic hepatitis B, eAb positive, with low HBV viremia (< 2000IU/ml), normal ALT, and no focal liver mass on abdominal US (3/2021), treatment naive. \par He had FibroScan in 2019 showing S1 (242 dB/m) steatosis and no fibrosis (F0-F1 5.8kPA) and in 12/2021 showing S2-3 steatosis (290dB/m), and no fibrosis (4.3 kPa). \par \par Chronic Hepatitis B, eAb pos, treatment naive\par - I have explained to him the natural history of chronic hepatitis B.\par - Most recent ALT WNL, Hep B DNA low (< 2000 IU/ml), and no fibrosis on Fibroscan (12/2021), thus did not meet criteria for treatment\par - Will get LFTs and Hep B DNA today\par - Will get repeat FibroScan in 1 year (12/2022)\par - C/w q 6 months HCC surveillance, will get AFP (was WNL in 11/2021), last US abd 12/2021 showing hepatic steatosis, no focal mass, ordered repeat\par - Patient has been counseled on prevention of HBV transmission, including but not limited to: not sharing toothbrushes, razors, injection equipment, glucose testing equipment, covering open cuts and scratches, using barrier protection (if sexual partner not immune), testing household and sexual contacts and vaccinating if not immune, not donating blood, organs or sperm. \par \par Hepatic steatosis, likely due to metabolic risk factors and alcohol Hx\par - FibroScan S2-3 steatosis, no fibrosis (12/2021)\par - US abd showing steatosis, LFTs WNL\par - Patient has been counseled on life style interventions, including but not limited to: weight loss (3-5% loss of body weight might improve fat in the liver, while 7-10% needed for potential improvement of other components, including inflammation and scarring of the liver, called fibrosis); healthy diet, avoiding added sugars, sodas, avoiding saturated fats, limiting sodium, avoiding alcohol; and on the importance of regular exercise (> 150 min/week moderate intensity aerobic exercise with at least 2x/week muscle strengthening or exercise as tolerated). \par - Still reports occasional alcohol, but significantly cut back. Strongly advised on strict, complete alcohol abstinency, defers substance abuse team\par \par HCM\par - Hep A immune (2018)\par - HCV ab neg (2019)\par - HIV neg (2015)\par - COVID vaccine ?\par - colonoscopy recently, following w Dr. Cramer, reports hemorrhoids\par \par Back pain\par - Follow up with PCP\par \par RTC 6 months but patient to do blood work and US abd and call office to discuss results

## 2022-05-30 NOTE — REASON FOR VISIT
[Follow-Up: _____] : a [unfilled] follow-up visit [Source: ______] : History obtained from [unfilled] [FreeTextEntry1] : Hep B, hepatic steatosis

## 2022-05-30 NOTE — PHYSICAL EXAM
[General Appearance - Alert] : alert [General Appearance - In No Acute Distress] : in no acute distress [General Appearance - Well Nourished] : well nourished [General Appearance - Well Developed] : well developed [General Appearance - Well-Appearing] : healthy appearing [Sclera] : the sclera and conjunctiva were normal [Oropharynx] : the oropharynx was normal [Neck Appearance] : the appearance of the neck was normal [] : no respiratory distress [Respiration, Rhythm And Depth] : normal respiratory rhythm and effort [Exaggerated Use Of Accessory Muscles For Inspiration] : no accessory muscle use [Auscultation Breath Sounds / Voice Sounds] : lungs were clear to auscultation bilaterally [Heart Rate And Rhythm] : heart rate was normal and rhythm regular [Heart Sounds] : normal S1 and S2 [Edema] : there was no peripheral edema [Cervical Lymph Nodes Enlarged Posterior Bilaterally] : posterior cervical [Cervical Lymph Nodes Enlarged Anterior Bilaterally] : anterior cervical [Supraclavicular Lymph Nodes Enlarged Bilaterally] : supraclavicular [Axillary Lymph Nodes Enlarged Bilaterally] : axillary [Inguinal Lymph Nodes Enlarged Bilaterally] : inguinal [No CVA Tenderness] : no ~M costovertebral angle tenderness [No Spinal Tenderness] : no spinal tenderness [Abnormal Walk] : normal gait [Skin Color & Pigmentation] : normal skin color and pigmentation [Skin Turgor] : normal skin turgor [Oriented To Time, Place, And Person] : oriented to person, place, and time [Impaired Insight] : insight and judgment were intact [Affect] : the affect was normal [Mood] : the mood was normal [Obese] : obese [Normal] : normal [Soft, Nontender] : the abdomen was soft and nontender [No Mass] : no masses were palpated [No HSM] : no hepatosplenomegaly noted [None] : no CVA tenderness [Scleral Icterus] : No Scleral Icterus [Hepatojugular Reflux] : patient did not have a sustained hepatojugular reflux [Abdominal  Ascites] : no ascites [Splenomegaly] : no splenomegaly [Liver Palpable ___ Finger Breadths Below Costal Margin] : was not palpable below costal margin [Asterixis] : no asterixis observed [Jaundice] : No jaundice [Depression] : no depression [FreeTextEntry4] : Limited b/o obesity [FreeTextEntry1] : Grossly intact [Dilated Collat. Veins] : no collateral vein dilation [Striae] : no striae [Firm] : not firm [Rigid] : not rigid [Rebound] : no rebound [Guarding] : no guarding [Mccormick's] : a negative Mccormick's sign [Liver Tender To Palpation] : not tender

## 2022-05-30 NOTE — REVIEW OF SYSTEMS
[Constipation] : constipation [Heartburn] : heartburn [Negative] : ENT [Fever] : no fever [Chills] : no chills [Feeling Poorly] : not feeling poorly [Feeling Tired] : not feeling tired [Recent Weight Gain (___ Lbs)] : no recent weight gain [Recent Weight Loss (___ Lbs)] : no recent weight loss [Eye Pain] : no eye pain [Red Eyes] : eyes not red [Dry Eyes] : no dryness of the eyes [Chest Pain] : no chest pain [Palpitations] : no palpitations [Lower Ext Edema] : no extremity edema [Shortness Of Breath] : no shortness of breath [Wheezing] : no wheezing [Cough] : no cough [Abdominal Pain] : no abdominal pain [Vomiting] : no vomiting [Diarrhea] : no diarrhea [Melena] : no melena [As Noted in HPI] : as noted in HPI [Joint Swelling] : no joint swelling [Limb Pain] : no limb pain [Limb Swelling] : no limb swelling [Itching] : no itching [Easy Bleeding] : no tendency for easy bleeding [Easy Bruising] : no tendency for easy bruising [FreeTextEntry7] : he denies nausea, hematemesis, rectal bleeding or jaundice.  [FreeTextEntry9] : Back pain

## 2022-05-30 NOTE — HISTORY OF PRESENT ILLNESS
[___ Month(s) Ago] : [unfilled] month(s) ago [None] : ~he/she~ had no significant interval events [Alcohol Abuse] : alcohol abuse [IV Drug Use] : no IV drug use [Tattoo] : no tattoos [Body Piercing] : no body piercing [Hemodialysis] : no hemodialysis [Transfusion before 1992] : no transfusion before 1992 [Transplant before 1992] : no transplant before 1992 [Occupational Exposure] : no occupational exposure [Cocaine Use] : no cocaine use [de-identified] : 67 year-old obese (BMI 32) man from [place] with history of HTN, dyslipidemia, obesity, BPH, Hx of chronic alcohol use (quit 2019), and chronic Hep B (reportedly acquired at age 14, when developed jaundice) is being followed for chronic Hep B and fatty liver due to MetS plus alcohol. \par \par He used to drink alcohol excessively for many years and has been drinking  3-4 beers daily on weekends, and 6-7 beers per day on weekends for years. He reportedly quit drinking in 2019. However, now reports again 3-4x a week, 3-4 beers on a occasion.\par \par Notably, patient`s brother  of alcohol liver disease and liver cancer at age of 64. \par \par Prior liver workup: anti-HAV IgG positive, anti-HBs negative, anti-HBc positive, Anti-HCV negative, TEN 1:80, SMA , and AMA were negative, A1AT was normal, iron panel was normal.\par \par 2018 abdominal US reported fatty infiltration of the liver, normal spleen or no ascites. normal GB and bile ducts. \par \par FibroScan 3/2019 reported CAP of 242, and kPa of 5.8 (S1, F0-F1).\par \par He lost follow up, was following with Dr. Benitez and transferred care in 2021.\par \par He is here for follow up. He c/o lower back pain, and reports worse when driving longer period of time, not radiating, no signs/symptoms of radiculopathy.  He denies dysuria, but in mornings feels that cannot complete urination fully, has been experiencing for 3-4 months. \par Still has occasional alcohol use.

## 2022-06-04 LAB — PHOSPHATIDYETHANOL (PETH), WHOLE BLOOD: 94 NG/ML

## 2022-06-28 ENCOUNTER — APPOINTMENT (OUTPATIENT)
Dept: INTERNAL MEDICINE | Facility: CLINIC | Age: 67
End: 2022-06-28
Payer: MEDICARE

## 2022-06-28 ENCOUNTER — NON-APPOINTMENT (OUTPATIENT)
Age: 67
End: 2022-06-28

## 2022-06-28 VITALS
HEART RATE: 62 BPM | BODY MASS INDEX: 31.07 KG/M2 | SYSTOLIC BLOOD PRESSURE: 146 MMHG | TEMPERATURE: 98 F | RESPIRATION RATE: 16 BRPM | HEIGHT: 70 IN | OXYGEN SATURATION: 97 % | DIASTOLIC BLOOD PRESSURE: 89 MMHG | WEIGHT: 217 LBS

## 2022-06-28 PROCEDURE — 99214 OFFICE O/P EST MOD 30 MIN: CPT

## 2022-06-28 NOTE — ASSESSMENT
[FreeTextEntry1] : 67 year old male found to have stable Essential Hypertension, Hypercholesterolemia, Chronic Hepatitis B, Vitamin D Deficiency, NAFLD, Cardiac Arrhythmia, with the current prescription regimen as recommended, diet and life style modifications, as counseled. Prior results reviewed, interpreted and discussed with the patient during today's examination, as appropriate. Follow up, treatment plan and tests, as ordered.\par \par Total time spent : 30 minutes\par Including:\par Preparation prior to visit - Reviewing prior record, results of tests and Consultation Reports as applicable\par Conducting an appropriate H & P during today's encounter\par Appropriate orders for tests, medications and procedures, as applicable\par Counseling patient \par Note completion\par

## 2022-06-28 NOTE — HEALTH RISK ASSESSMENT
[Former] : Former [No] : In the past 12 months have you used drugs other than those required for medical reasons? No [No falls in past year] : Patient reported no falls in the past year [0] : 2) Feeling down, depressed, or hopeless: Not at all (0) [de-identified] : HEP [[id number]] : 0

## 2022-06-28 NOTE — HISTORY OF PRESENT ILLNESS
[Other: ______] : provided by ISRAEL [Interpreters_IDNumber] : [id number] [Interpreters_FullName] : Bruna [[id number]] : Belgian [de-identified] : 67 year old  male patient with history of stable Essential Hypertension, Hypercholesterolemia, Chronic Hepatitis B, Vitamin D Deficiency, NAFLD, history as stated, presented for follow up examination. Patient is compliant with all medications. Denies shortness of breath, chest pain or abdominal pains at this time. ROS as stated.\par

## 2022-07-26 ENCOUNTER — APPOINTMENT (OUTPATIENT)
Dept: UROLOGY | Facility: CLINIC | Age: 67
End: 2022-07-26

## 2022-07-26 VITALS
SYSTOLIC BLOOD PRESSURE: 139 MMHG | HEIGHT: 70 IN | BODY MASS INDEX: 31.07 KG/M2 | DIASTOLIC BLOOD PRESSURE: 78 MMHG | HEART RATE: 73 BPM | TEMPERATURE: 97.5 F | WEIGHT: 217 LBS

## 2022-07-26 PROCEDURE — 99214 OFFICE O/P EST MOD 30 MIN: CPT

## 2022-07-26 NOTE — ASSESSMENT
[FreeTextEntry1] : Very pleasant 67-year-old gentleman who presents for follow-up of BPH, screening for prostate cancer, new complaint of weak urinary stream\par -PSA 0.78\par -Urinalysis\par -Urine culture\par -Discussed the natural history of BPH, as well as typical symptoms of an enlarged prostate. Discussed potential complications that could arise from BPH, including but not limited to urinary tract infections, bladder stones, and renal impairment.\par -Trial of Flomax \par -I discussed the risks, benefits, alternatives, and possible side effects of alpha blocker therapy with the patient, including but not limited to dizziness, lightheadedness, headache, blurred vision, retrograde ejaculation, and priapism with the patient. I also discussed that the patient must inform his ophthalmologist that he has taken an alpha blocker prior to undergoing cataract or glaucoma surgery.\par -Follow-up in 1 month

## 2022-07-26 NOTE — HISTORY OF PRESENT ILLNESS
[FreeTextEntry1] : Very pleasant 67-year-old gentleman who presents for follow-up of BPH, screening for prostate cancer, new complaint of weak urinary stream.  He reports that this started over the last few months.  It is very bothersome to him.  He reports that it is worse during the morning.  He denies dysuria.  No nausea or vomiting.  No flank pain or suprapubic pain.  Recent PSA 0.78.

## 2022-07-27 LAB
APPEARANCE: CLEAR
BACTERIA: NEGATIVE
BILIRUBIN URINE: NEGATIVE
BLOOD URINE: NEGATIVE
COLOR: YELLOW
GLUCOSE QUALITATIVE U: NEGATIVE
HYALINE CASTS: 2 /LPF
KETONES URINE: NEGATIVE
LEUKOCYTE ESTERASE URINE: NEGATIVE
MICROSCOPIC-UA: NORMAL
NITRITE URINE: NEGATIVE
PH URINE: 6
PROTEIN URINE: NORMAL
RED BLOOD CELLS URINE: 5 /HPF
SPECIFIC GRAVITY URINE: 1.02
SQUAMOUS EPITHELIAL CELLS: 0 /HPF
UROBILINOGEN URINE: NORMAL
WHITE BLOOD CELLS URINE: 1 /HPF

## 2022-08-02 LAB — BACTERIA UR CULT: ABNORMAL

## 2022-08-09 ENCOUNTER — APPOINTMENT (OUTPATIENT)
Dept: UROLOGY | Facility: CLINIC | Age: 67
End: 2022-08-09

## 2022-08-09 ENCOUNTER — APPOINTMENT (OUTPATIENT)
Dept: INTERNAL MEDICINE | Facility: CLINIC | Age: 67
End: 2022-08-09

## 2022-08-09 VITALS
OXYGEN SATURATION: 97 % | HEIGHT: 70 IN | HEART RATE: 71 BPM | BODY MASS INDEX: 30.06 KG/M2 | WEIGHT: 210 LBS | TEMPERATURE: 97.7 F | SYSTOLIC BLOOD PRESSURE: 120 MMHG | DIASTOLIC BLOOD PRESSURE: 67 MMHG

## 2022-08-09 DIAGNOSIS — K59.00 CONSTIPATION, UNSPECIFIED: ICD-10-CM

## 2022-08-09 DIAGNOSIS — K57.30 DIVERTICULOSIS OF LARGE INTESTINE W/OUT PERFORATION OR ABSCESS W/OUT BLEEDING: ICD-10-CM

## 2022-08-09 DIAGNOSIS — K64.8 OTHER HEMORRHOIDS: ICD-10-CM

## 2022-08-09 DIAGNOSIS — N39.0 URINARY TRACT INFECTION, SITE NOT SPECIFIED: ICD-10-CM

## 2022-08-09 PROCEDURE — T1013: CPT

## 2022-08-09 PROCEDURE — 99214 OFFICE O/P EST MOD 30 MIN: CPT

## 2022-08-09 PROCEDURE — 99213 OFFICE O/P EST LOW 20 MIN: CPT

## 2022-08-09 RX ORDER — POLYETHYLENE GLYCOL 3350 17 G/17G
17 POWDER, FOR SOLUTION ORAL
Qty: 1 | Refills: 0 | Status: COMPLETED | COMMUNITY
Start: 2021-04-19 | End: 2022-08-09

## 2022-08-09 RX ORDER — BISACODYL 5 MG/1
5 TABLET, COATED ORAL
Qty: 4 | Refills: 0 | Status: COMPLETED | COMMUNITY
Start: 2021-04-19 | End: 2022-08-09

## 2022-08-09 NOTE — HISTORY OF PRESENT ILLNESS
[FreeTextEntry1] : Very pleasant 67-year-old gentleman presents for follow-up of BPH with urinary obstruction, weak urinary stream, new finding of microscopic hematuria.  He was recently found to have 5 red blood cells per high-powered field on urinalysis.  He was also found to have a urinary tract infection.  He completed a course of Cipro and feels much better.  He recently underwent a CT scan as well which demonstrated bladder wall thickening but no kidney stones, hydronephrosis, renal masses.

## 2022-08-09 NOTE — ASSESSMENT
[FreeTextEntry1] : 1Fatty Liver: The patient denies any jaundice or pruritus. The patient denies any alcohol use. The patient denies taking large doses of nonsteroidal anti-inflammatory drugs or acetaminophen. The findings are suggestive of fatty liver. The patient and I had a long discussion regarding the risks of fatty liver progressing to cirrhosis. The patient was told of the possible increased risk of developing liver failure, cirrhosis, ascites, GI bleeding secondary to varices, hepatic encephalopathy, bleeding tendencies and liver cancer. The patient was told of the importance of follow-up. The patient was advised to follow up every 6 months for blood work and imaging studies. The patient agreed and will follow up. The patient was advised to lose weight. I recommend a trial of vitamin E supplementation for the fatty liver. If the liver enzymes remain elevated, the patient may require a trial of Pioglitazone for the fatty liver. I recommend avoid alcohol and hepato-toxic agents. The patient was also advised to avoid NSAIDs, Acetaminophen and any other hepatotoxic drugs. The patient was also advised not to share needles, razors, scissors, nail clippers, etc.. The patient is to continue close follow-up in our office for blood work and exams. If the liver enzymes remain elevated, the patient may require a CT guided liver biopsy to assess the liver parenchyma and for possible treatment. We had a long discussion regarding the risks and benefits of the procedure. The patient was told of the risks of bleeding, perforation, infections, emergency surgery and missing lesions. The patient agreed and will follow-up to reassess the symptoms Patient has been counseled on life style interventions, including but not limited to: weight loss (3-5% loss of body weight might improve fat in the liver, while 7-10% needed for potential improvement of other components, including inflammation and scarring of the liver, called fibrosis); healthy diet, avoiding added sugars, sodas, avoiding saturated fats, limiting sodium, avoiding alcohol; and on the importance of regular exercise (> 150 min/week moderate intensity aerobic exercise with at least 2x/week muscle strengthening or exercise as tolerated). \par - I explained to patient the natural Hx of NAFLD. \par  2  constipation  resolved continue  high fiber  Dietary fiber — found mainly in fruits, vegetables, whole grains and legumes — is probably best known for its ability to prevent or relieve constipation. But foods containing fiber can provide other health benefits as well, such as helping to maintain a healthy weight and lowering your risk of diabetes and heart disease.\par \par Selecting tasty foods that provide fiber isn't difficult. Find out how much dietary fiber you need, the foods that contain it, and how to add them to meals and snacks.\par \par Dietary fiber, also known as roughage or bulk, includes the parts of plant foods your body can't digest or absorb. Unlike other food components, such as fats, proteins or carbohydrates — which your body breaks down and absorbs — fiber isn't digested by your body. Instead, it passes relatively intact through your stomach, small intestine and colon and out of your body.\par \par Fiber is commonly classified as soluble, which dissolves in water, or insoluble, which doesn't dissolve.\par •Soluble fiber. This type of fiber dissolves in water to form a gel-like material. It can help lower blood cholesterol and glucose levels. Soluble fiber is found in oats, peas, beans, apples, citrus fruits, carrots, barley and psyllium.\par •Insoluble fiber. This type of fiber promotes the movement of material through your digestive system and increases stool bulk, so it can be of benefit to those who struggle with constipation or irregular stools. Whole-wheat flour, wheat bran, nuts, beans and vegetables, such as cauliflower, green beans and potatoes, are good sources of insoluble fiber.\par \par Most plant-based foods, such as oatmeal and beans, contain both soluble and insoluble fiber. However, the amount of each type varies in different plant foods. To receive the greatest health benefit, eat a wide variety of high-fiber foods.\par \par A high-fiber diet has many benefits, which include:\par •Normalizes bowel movements. Dietary fiber increases the weight and size of your stool and softens it. A bulky stool is easier to pass, decreasing your chance of constipation. If you have loose, watery stools, fiber may help to solidify the stool because it absorbs water and adds bulk to stool.\par •Helps maintain bowel health. A high-fiber diet may lower your risk of developing hemorrhoids and small pouches in your colon (diverticular disease). Some fiber is fermented in the colon. Researchers are looking at how this may play a role in preventing diseases of the colon.\par •Lowers cholesterol levels. Soluble fiber found in beans, oats, flaxseed and oat bran may help lower total blood cholesterol levels by lowering low-density lipoprotein, or "bad," cholesterol levels. Studies also have shown that high-fiber foods may have other heart-health benefits, such as reducing blood pressure and inflammation.\par •Helps control blood sugar levels. In people with diabetes, fiber — particularly soluble fiber — can slow the absorption of sugar and help improve blood sugar levels. A healthy diet that includes insoluble fiber may also reduce the risk of developing type 2 diabetes.\par •Aids in achieving healthy weight. High-fiber foods tend to be more filling than low-fiber foods, so you're likely to eat less and stay satisfied longer. And high-fiber foods tend to take longer to eat and to be less "energy dense," which means they have fewer calories for the same volume of food.\par \par Another benefit attributed to dietary fiber is prevention of colorectal cancer. However, the evidence that fiber reduces colorectal cancer is mixed.\par \par The [employer], which provides science-based advice on matters of medicine and health, gives the following daily fiber recommendations for adults:\par \par \par \par Age 50 or younger\par \par Age 51 or older\par \par \par [employer] \par \par Men 38 grams 30 grams \par Women 25 grams 21 grams \par \par If you aren't getting enough fiber each day, you may need to boost your intake. Good choices include:\par •Whole-grain products\par •Fruits\par •Vegetables\par •Beans, peas and other legumes\par •Nuts and seeds\par \par Refined or processed foods — such as canned fruits and vegetables, pulp-free juices, white breads and pastas, and non-whole-grain cereals — are lower in fiber. The grain-refining process removes the outer coat (bran) from the grain, which lowers its fiber content. Enriched foods have some of the B vitamins and iron back after processing, but not the fiber.\par \par Whole foods rather than fiber supplements are generally better. Fiber supplements — such as Metamucil, Citrucel and FiberCon — don't provide the variety of fibers, vitamins, minerals and other beneficial nutrients that foods do.\par \par Another way to get more fiber is to eat foods, such as cereal, granola bars, yogurt, and ice cream, with fiber added. The added fiber usually is labeled as "inulin" or "chicory root." Some people complain of gassiness after eating foods with added fiber.\par \par However, some people may still need a fiber supplement if dietary changes aren't sufficient or if they have certain medical conditions, such as constipation, diarrhea or irritable bowel syndrome. Check with your doctor before taking fiber supplements.\par \par Need ideas for adding more fiber to your meals and snacks? Try these suggestions:\par •Jump-start your day. For breakfast choose a high-fiber breakfast cereal — 5 or more grams of fiber a serving. Opt for cereals with "whole grain," "bran" or "fiber" in the name. Or add a few tablespoons of unprocessed wheat bran to your favorite cereal.\par •Switch to whole grains. Consume at least half of all grains as whole grains. Look for breads that list whole wheat, whole-wheat flour or another whole grain as the first ingredient on the label and have least 2 grams of dietary fiber a serving. [place] with brown rice, wild rice, barley, whole-wheat pasta and bulgur wheat.\par •Bulk up baked goods. Substitute whole-grain flour for half or all of the white flour when baking. Try adding crushed bran cereal, unprocessed wheat bran or uncooked oatmeal to muffins, cakes and cookies.\par •Lean on legumes. Beans, peas and lentils are excellent sources of fiber. Add kidney beans to canned soup or a green salad. Or make nachos with refried black beans, lots of fresh veggies, whole-wheat tortilla chips and salsa.\par •Eat more fruit and vegetables. Fruits and vegetables are rich in fiber, as well as vitamins and minerals. Try to eat five or more servings daily.\par •Make snacks count. Fresh fruits, raw vegetables, low-fat popcorn and whole-grain crackers are all good choices. An occasional handful of nuts or dried fruits also is a healthy, high-fiber snack — although be aware that nuts and dried fruits are high in calories.\par \par High-fiber foods are good for your health. But adding too much fiber too quickly can promote intestinal gas, abdominal bloating and cramping. Increase fiber in your diet gradually over a period of a few weeks. This allows the natural bacteria in your digestive system to adjust to the change.\par \par Also, drink plenty of water. Fiber works best when it absorbs water, making your stool soft and bulky\par 3  diverticulosis  diverticulosis high fiber diet and watch for signs of inflammation .  abd pain change in bowel movement and size and shape of stools.  A person with diverticulosis may have diverticulitis, or diverticular bleeding.\par \par Diverticulitis — Inflammation of a diverticulum (diverticulitis) occurs when there is thinning and breakdown of the diverticular wall. This may be caused by increased pressure within the colon or by hardened particles of stool, which can become lodged within the diverticulum.\par \par The symptoms of diverticulitis depend upon the degree of inflammation present. The most common symptom is pain in the left lower abdomen. Other symptoms can include nausea and vomiting, constipation, diarrhea, and urinary symptoms such as pain or burning when urinating or the frequent need to urinate.\par \par Diverticulitis is divided into simple and complicated forms.\par \par ?Simple diverticulitis, which accounts for 75 percent of cases, is not associated with complications and typically responds to medical treatment without surgery.\par \par \par ?Complicated diverticulitis occurs in 25 percent of cases and usually requires surgery. Complications associated with diverticulitis can include the following:\par \par \par •Abscess – a localized collection of pus\par \par •Fistula – an abnormal tract between two areas that are not normally connected (eg, bowel and bladder)\par \par •Obstruction – a blockage of the colon\par \par •Peritonitis – infection involving the space around the abdominal organ\par \par •Sepsis – overwhelming body-wide infection that can lead to failure of multiple organs\par \par \par Diverticular bleeding — Diverticular bleeding occurs when a small artery located within a diverticulum is eroded and bleeds into the colon.\par \par Diverticular bleeding usually causes painless bleeding from the rectum. In approximately 50 percent of cases, the person will see maroon or bright red blood with bowel movements.\par \par Is bleeding with a bowel movement normal? — It is not normal to see blood in a bowel movement; this can be a sign of several conditions, most of which are not serious (eg, hemorrhoids) but some of which are serious and require immediate treatment. Anyone who sees blood after a bowel movement should consult with their healthcare provider to determine if further testing or evaluation is neededPeople with diverticulosis who do not have symptoms do not require treatment. However, most clinicians recommend increasing fiber in the diet, which can help to bulk the stools and possibly prevent the development of new diverticula, diverticulitis, or diverticular bleeding. Fiber is not proven to prevent these conditions in all patients but may help to control recurrent episodes in some.\par \par Increase fiber — Fruits and vegetables are a good source of fiber (table 1). The fiber content of packaged foods can be calculated by reading the nutrition label (figure 2). (See "Patient education: High-fiber diet (Beyond the Basics)".)\par \par Seeds and nuts — Patients with diverticular disease have historically been advised to avoid whole pieces of fiber (such as seeds, corn, and nuts) because of concern that these foods could cause an episode of diverticulitis. However, this belief is completely unproven. We do not suggest that patients with diverticulosis avoid seeds, corn, or nuts.\par \par Diverticulitis — Treatment of diverticulitis depends upon how severe your symptoms are.\par \par Home treatment — If you have mild symptoms of diverticulitis (mild abdominal pain, usually left lower abdomen), you can be treated at home with a clear liquid diet and oral antibiotics. However, if you develop one or more of the following signs or symptoms, you should seek immediate medical attention:\par \par ?Temperature >100.1°F (38°C)\par \par ?Worsening or severe abdominal pain\par \par ?An inability to tolerate fluids\par \par \par Hospital treatment — If you have moderate to severe symptoms, you may be hospitalized for treatment. During this time, you are not allowed to eat or drink; antibiotics and fluids are given into a vein.\par \par If you develop an abscess of the colon, you may require drainage of the abscess (usually performed by placing a drainage tube across the abdominal wall) or by surgically opening the affected area.\par \par Surgery — If you develop a generalized infection in the abdomen (peritonitis), you will usually require an emergency operation. A two-part operation may be necessary in some cases.\par \par ?The first operation involves removal of the diseased colon and creation of a colostomy. A colostomy is an opening between the colon and the skin, where a bag is attached to collect waste from the intestine. The lower end of the colon is temporarily sewed closed to allow it to heal (figure 3).\par \par \par ?Approximately three to six months later, a second operation is performed to reconnect the two parts of the colon and close the opening in the skin. You are then able to empty your bowels through the rectum. Sometimes patients require up to a year to recover from the first operation, depending on how sick they were.\par \par \par In non-emergency situations, the diseased area of the colon can be removed and the two ends of the colon can be reconnected in one operation, without the need for a colostomy.\par \par Surgery versus medical therapy — An operation to remove the diseased area of the colon may be necessary if you do not improve with medical therapy. After an episode of uncomplicated diverticulitis, elective surgery is generally not required as the risk of another attack or requiring emergency surgery is low. However, patients with persistent symptoms attributable to diverticulitis, a history of complicated diverticulitis, or a compromised immune system should be evaluated for possible surgery to prevent another attack. In such patients, another attack has been associated with a higher risk of complications or death. Of course, the decision will also depend in part upon your other medical conditions and ability to undergo surgery.\par \par In many cases, an elective operation can be performed laparoscopically, using small incisions, rather than the typical vertical (up and down) abdominal incision. Laparoscopic surgery usually allows you to recover more quickly and shortens the hospital stay.\par \par After diverticulitis resolves — After an episode of diverticulitis resolves, if you have not had a recent colonoscopy, the entire length of the colon should be evaluated to determine the extent of disease and to rule out the presence of abnormal lesions such as polyps or cancer. Recommended tests include colonoscopy, barium enema and sigmoidoscopy, or CT colonography. (See "Patient education: Screening for colorectal cancer (Beyond the Basics)".)\par \par Diverticular bleeding — Most cases of diverticular bleeding resolve on their own. However, some people will need further testing or treatment to stop bleeding, which may include a colonoscopy, angiography (a treatment that blocks off the bleeding artery), bleeding scan, or surgery.\par \par \par DIVERTICULAR DISEASE PROGNOSIS\par \par Diverticulosis — Over time, diverticulosis may cause no problems or it may cause episodes of bleeding and/or diverticulitis. Approximately 15 to 25 percent of people with diverticulosis will develop diverticulitis, while 5 to 15 percent will develop diverticular bleeding.\par \par Diverticulitis — Approximately 85 percent of people with uncomplicated diverticulitis will respond to medical treatment, while approximately 15 percent of patients will need an operation. After successful treatment for a first attack of diverticulitis, one-third of patients will remain asymptomatic, one-third will have episodic cramps without diverticulitis, and one-third will go on to have a second attack of diverticulitis.\par \par The prognosis tends to remain similar following a second attack of diverticulitis. Only 10 percent of people remain symptom-free after a second attack. Subsequent attacks tend to be of similar severity, not increasing in severity as previously believed.\par 4.  lactose intolerance   continue lactaid   and avoidance.\par 5 .  hiatal hernia-    A hiatal hernia occurs when the upper part of your stomach bulges through the large muscle  your abdomen and chest (diaphragm).\par \par Your diaphragm has a small opening (hiatus) through which your food tube (esophagus) passes before connecting to your stomach. In a hiatal hernia, the stomach pushes up through that opening and into your chest.\par \par \par A small hiatal hernia usually doesn't cause problems. You may never know you have one unless your doctor discovers it when checking for another condition.\par \par But a large hiatal hernia can allow food and acid to back up into your esophagus, leading to heartburn. Self-care measures or medications can usually relieve these symptoms. A very large hiatal hernia might require surgery.\par \par \par \par \par Symptoms\par \par Most small hiatal hernias cause no signs or symptoms. But larger hiatal hernias can cause:\par •Heartburn\par •Regurgitation of food or liquids into the mouth\par •Backflow of stomach acid into the esophagus (acid reflux)\par •Difficulty swallowing\par •Chest or abdominal pain\par •Shortness of breath\par •Vomiting of blood or passing of black stools, which may indicate gastrointestinal bleeding\par \par When to see a doctor\par \par See your doctor if you have any persistent signs or symptoms that worry you.\par \par Request an Appointment at [facility]\par \par Causes\par \par A hiatal hernia occurs when weakened muscle tissue allows your stomach to bulge up through your diaphragm. It's not always clear why this happens. But a hiatal hernia might be caused by:\par •Age-related changes in your diaphragm\par •Injury to the area, for example, after trauma or certain types of surgery\par •Being born with an unusually large hiatus\par •Persistent and intense pressure on the surrounding muscles, such as while coughing, vomiting, straining during a bowel movement, exercising or lifting heavy objects\par \par Risk factors\par \par Hiatal hernia is most common in people who are:\par •Age 50 or older\par •Obese\par

## 2022-08-09 NOTE — HISTORY OF PRESENT ILLNESS
[Heartburn] : denies heartburn [Nausea] : denies nausea [Vomiting] : denies vomiting [Diarrhea] : denies diarrhea [Constipation] : denies constipation [Yellow Skin Or Eyes (Jaundice)] : denies jaundice [Abdominal Pain] : denies abdominal pain [Abdominal Swelling] : denies abdominal swelling [Rectal Pain] : denies rectal pain [Wt Gain ___ Lbs] : no recent weight gain [Wt Loss ___ Lbs] : recent [unfilled] ~Upound(s) weight loss [GERD] : no gastroesophageal reflux disease [Hiatus Hernia] : hiatus hernia [Pancreatitis] : no pancreatitis [Cholelithiasis] : no cholelithiasis [Kidney Stone] : no kidney stone [Inflammatory Bowel Disease] : no inflammatory bowel disease [Irritable Bowel Syndrome] : no irritable bowel syndrome [Diverticulitis] : no diverticulitis [Alcohol Abuse] : no alcohol abuse [Malignancy] : no malignancy [Abdominal Surgery] : no abdominal surgery [Appendectomy] : no appendectomy [Cholecystectomy] : no cholecystectomy [Good Compliance] : good compliance with treatment [de-identified] : Pt is a 67 yr old man who has fatty liver, chronic hep B and is followed by hepatologist  . He doesn’t meet criteria for treatment.  He doesn’t have rectal bleeding  The patient denies any large doses of nonsteroidal anti-inflammatory drugs or acetaminophen. The patient denies sharing needles, razors, nail clippers, nail files, scissors, et cetera. The patient denies any EtOH abuse now, cocaine use or intravenous drug use. The patient denies any blood transfusions, sexual indiscretions, tattoos or piercing. The patient admits to having prior surgery. The patient denies any constipation or diarrhea. The patient has 2 bowel movements a day. The patient denies a change in bowel habits. The patient denies a change in caliber of stool. The patient denies having mucus discharge with the bowel movements. The patient denies any bright red blood per rectum, melena or hematemesis. The patient denies any rectal pain or rectal pruritus. The patient denies any weight loss or anorexia. She denies any fevers or chills.\par  he has  diverticulosis and melanosis.   He is not anemic    and no black stools

## 2022-08-09 NOTE — ASSESSMENT
[FreeTextEntry1] : Very pleasant 67-year-old gentleman who presents for follow-up of BPH, weak urinary stream, new finding of microscopic hematuria\par -CT images from [facility] radiology reviewed demonstrating no kidney stones, hydronephrosis, renal masses, but it does demonstrate bladder wall thickening\par -We discussed potential etiologies of bladder wall thickening\par -Urine cytology\par -Repeat urine culture\par -Urinalysis demonstrates 5 red blood cells per high-power field\par -Follow-up for cystoscopy
No

## 2022-08-09 NOTE — REASON FOR VISIT
[Follow-Up: _____] : a [unfilled] follow-up visit [[employer] ] : provided by [employer]   [Time Spent: ____ minutes] : Total time spent using  services: [unfilled] minutes. The patient's primary language is not English thus required  services. [Interpreters_IDNumber] : [id number] [Interpreters_FullName] : Vu Marquez [[id number]] : [place]

## 2022-08-09 NOTE — PHYSICAL EXAM
[General Appearance - Alert] : alert [General Appearance - In No Acute Distress] : in no acute distress [Sclera] : the sclera and conjunctiva were normal [PERRL With Normal Accommodation] : pupils were equal in size, round, and reactive to light [Oropharynx] : the oropharynx was normal [Auscultation Breath Sounds / Voice Sounds] : lungs were clear to auscultation bilaterally [Apical Impulse] : the apical impulse was normal [Heart Rate And Rhythm] : heart rate was normal and rhythm regular [Heart Sounds] : normal S1 and S2 [Heart Sounds Gallop] : no gallops [Full Pulse] : the pedal pulses are present [Edema] : there was no peripheral edema [Normal] : normal [Soft, Nontender] : the abdomen was soft and nontender [No Mass] : no masses were palpated [No HSM] : no hepatosplenomegaly noted [None] : no CVA tenderness [Cervical Lymph Nodes Enlarged Posterior Bilaterally] : posterior cervical [Cervical Lymph Nodes Enlarged Anterior Bilaterally] : anterior cervical [No CVA Tenderness] : no ~M costovertebral angle tenderness [Abnormal Walk] : normal gait [Nail Clubbing] : no clubbing  or cyanosis of the fingernails [Musculoskeletal - Swelling] : no joint swelling seen [Motor Tone] : muscle strength and tone were normal [Skin Color & Pigmentation] : normal skin color and pigmentation [Skin Turgor] : normal skin turgor [] : no rash [Deep Tendon Reflexes (DTR)] : deep tendon reflexes were 2+ and symmetric [Sensation] : the sensory exam was normal to light touch and pinprick [No Focal Deficits] : no focal deficits [Oriented To Time, Place, And Person] : oriented to person, place, and time [Impaired Insight] : insight and judgment were intact [Affect] : the affect was normal

## 2022-08-11 LAB
BACTERIA UR CULT: NORMAL
URINE CYTOLOGY: NORMAL

## 2022-08-30 ENCOUNTER — APPOINTMENT (OUTPATIENT)
Dept: UROLOGY | Facility: CLINIC | Age: 67
End: 2022-08-30

## 2022-08-30 PROCEDURE — 52000 CYSTOURETHROSCOPY: CPT

## 2022-09-06 ENCOUNTER — APPOINTMENT (OUTPATIENT)
Dept: UROLOGY | Facility: CLINIC | Age: 67
End: 2022-09-06

## 2022-09-27 ENCOUNTER — APPOINTMENT (OUTPATIENT)
Dept: INTERNAL MEDICINE | Facility: CLINIC | Age: 67
End: 2022-09-27

## 2022-09-27 ENCOUNTER — RX RENEWAL (OUTPATIENT)
Age: 67
End: 2022-09-27

## 2022-09-27 VITALS
RESPIRATION RATE: 15 BRPM | HEIGHT: 70 IN | OXYGEN SATURATION: 96 % | WEIGHT: 214 LBS | TEMPERATURE: 97.3 F | HEART RATE: 60 BPM | BODY MASS INDEX: 30.64 KG/M2 | DIASTOLIC BLOOD PRESSURE: 94 MMHG | SYSTOLIC BLOOD PRESSURE: 153 MMHG

## 2022-09-27 PROCEDURE — 99214 OFFICE O/P EST MOD 30 MIN: CPT | Mod: 25

## 2022-09-27 PROCEDURE — 90472 IMMUNIZATION ADMIN EACH ADD: CPT

## 2022-09-27 PROCEDURE — 90677 PCV20 VACCINE IM: CPT

## 2022-09-27 PROCEDURE — 90662 IIV NO PRSV INCREASED AG IM: CPT

## 2022-09-27 PROCEDURE — G0008: CPT

## 2022-09-27 NOTE — HISTORY OF PRESENT ILLNESS
[de-identified] : 67 year old  male patient with history of stable Essential Hypertension, Hypercholesterolemia, Chronic Hepatitis B, Vitamin D Deficiency, NAFLD, history as stated, presented for follow up examination. Patient is compliant with all medications. Denies shortness of breath, chest pain or abdominal pains at this time. ROS as stated.\par

## 2022-09-27 NOTE — INTERPRETER SERVICES
[Other: ______] : provided by [place] [Interpreters_IDNumber] : [id number] [Interpreters_FullName] : Akin [TWNoteComboBox1] : [place]an

## 2022-09-27 NOTE — HEALTH RISK ASSESSMENT
[Former] : Former [No] : In the past 12 months have you used drugs other than those required for medical reasons? No [No falls in past year] : Patient reported no falls in the past year [0] : 2) Feeling down, depressed, or hopeless: Not at all (0) [de-identified] : URO/GI [[id number]] : 0

## 2022-10-25 ENCOUNTER — RX CHANGE (OUTPATIENT)
Age: 67
End: 2022-10-25

## 2022-10-25 RX ORDER — TAMSULOSIN HYDROCHLORIDE 0.4 MG/1
0.4 CAPSULE ORAL
Qty: 30 | Refills: 1 | Status: DISCONTINUED | COMMUNITY
Start: 2022-07-26 | End: 2022-10-25

## 2022-12-27 ENCOUNTER — APPOINTMENT (OUTPATIENT)
Dept: INTERNAL MEDICINE | Facility: CLINIC | Age: 67
End: 2022-12-27

## 2022-12-27 VITALS
WEIGHT: 216 LBS | SYSTOLIC BLOOD PRESSURE: 146 MMHG | BODY MASS INDEX: 30.92 KG/M2 | OXYGEN SATURATION: 96 % | HEIGHT: 70 IN | RESPIRATION RATE: 16 BRPM | HEART RATE: 65 BPM | DIASTOLIC BLOOD PRESSURE: 80 MMHG | TEMPERATURE: 98 F

## 2022-12-27 PROCEDURE — 99214 OFFICE O/P EST MOD 30 MIN: CPT

## 2022-12-27 NOTE — HEALTH RISK ASSESSMENT
[Former] : Former [No] : In the past 12 months have you used drugs other than those required for medical reasons? No [No falls in past year] : Patient reported no falls in the past year [0] : 2) Feeling down, depressed, or hopeless: Not at all (0) [de-identified] : None [[id number]] : 0

## 2022-12-27 NOTE — ASSESSMENT
Preparing for Your Surgery      Name:  Sandra Jara   MRN:  [id number]   :  1962   Today's Date:  2020       Arriving for surgery:  Surgery date:  20  Arrival time:  9:00 am    Restrictions due to COVID 19:  No visitors are allowed at this time.    [employer] parking is available for anyone with mobility limitations or disabilities.  ([facility]  24 hours/ 7 days a week; [facility]  7 am- 3:30 pm, Mon- Fri)    Please come to:       [facility] [address]  [address] [place]  [address]     -    Please proceed to the Surgery Lounge on the 3rd floor. [phone]?     - ?If you are in need of directions, wheelchair or escort please stop at the Information Desk in the lobby.  Inform the information person that you are here for surgery; a wheelchair and escort will be provided to the Surgery Lounge .?     What can I eat or drink?  -  You may eat and drink normally for up to 8 hours before your surgery. (Until 3:00 am)  -  You may have clear liquids until 2 hours before surgery. (Until 9:00 am)    Examples of clear liquids:  Water  Clear broth  Juices (apple, white grape, white cranberry  and cider) without pulp  Noncarbonated, powder based beverages  (lemonade and Michele-Aid)  Sodas (Sprite, 7-Up, ginger ale and seltzer)  Coffee or tea (without milk or cream)  Gatorade    -  No Alcohol for at least 24 hours before surgery     Which medicines can I take?    Hold Aspirin for 7 days before surgery.   Hold Multivitamins for 7 days before surgery.  Hold Supplements for 7 days before surgery.  Hold Ibuprofen (Advil, Motrin) for 1 day before surgery--unless otherwise directed by surgeon.  Hold Naproxen (Aleve) for 4 days before surgery.    -  PLEASE TAKE these medications the day of surgery:      tiZANidine (ZANAFLEX) 4 MG tablet      traMADol (ULTRAM) 50 MG tablet    How do I prepare myself?  - Please take 2 showers before surgery using Scrubcare or Hibiclens  [FreeTextEntry1] : 67 year old male found to have stable Essential Hypertension, Hypercholesterolemia, Chronic Hepatitis B, Vitamin D Deficiency, NAFLD, Cardiac Arrhythmia, with the current prescription regimen as recommended, diet and life style modifications, as counseled. Prior results reviewed, interpreted and discussed with the patient during today's examination, as appropriate. Follow up, treatment plan and tests, as ordered.\par \par Total time spent : 30 minutes\par Including:\par Preparation prior to visit - Reviewing prior record, results of tests and Consultation Reports as applicable\par Conducting an appropriate H & P during today's encounter\par Appropriate orders for tests, medications and procedures, as applicable\par Counseling patient \par Note completion\par  soap.    Use this soap only from the neck to your toes.     Leave the soap on your skin for one minute--then rinse thoroughly.      You may use your own shampoo and conditioner; no other hair products.   - Please remove all jewelry and body piercings.  - No lotions, deodorants or fragrance.  - No makeup or fingernail polish.   - Bring your ID and insurance card.    - All patients are required to have a Covid-19 test within 4 days of surgery/procedure.      -Patients will be contacted by the [facility] scheduling team within 1 week of surgery to make an appointment.      - Patients may call the Scheduling team at [phone] if they have not been scheduled within 4 days of  surgery.      ALL PATIENTS GOING HOME THE SAME DAY OF SURGERY ARE REQUIRED TO HAVE A RESPONSIBLE ADULT TO DRIVE AND BE IN ATTENDANCE WITH THEM FOR 24 HOURS FOLLOWING SURGERY     Questions or Concerns:    - For any questions regarding the day of surgery or your hospital stay, please contact the Pre Admission Nursing Office at [phone].       - If you have health changes between today and your surgery please call your surgeon.       For questions after surgery please call your surgeons office.

## 2022-12-27 NOTE — HISTORY OF PRESENT ILLNESS
[de-identified] : 67 year old  male patient with history of stable Essential Hypertension, Hypercholesterolemia, Chronic Hepatitis B, Vitamin D Deficiency, NAFLD, history as stated, presented for follow up examination. Patient is compliant with all medications. Denies shortness of breath, chest pain or abdominal pains at this time. ROS as stated.\par

## 2023-02-28 ENCOUNTER — APPOINTMENT (OUTPATIENT)
Dept: UROLOGY | Facility: CLINIC | Age: 68
End: 2023-02-28
Payer: MEDICARE

## 2023-02-28 VITALS
HEIGHT: 70 IN | OXYGEN SATURATION: 97 % | BODY MASS INDEX: 31.5 KG/M2 | DIASTOLIC BLOOD PRESSURE: 79 MMHG | WEIGHT: 220 LBS | TEMPERATURE: 97.8 F | HEART RATE: 88 BPM | SYSTOLIC BLOOD PRESSURE: 126 MMHG

## 2023-02-28 PROCEDURE — 99214 OFFICE O/P EST MOD 30 MIN: CPT

## 2023-02-28 NOTE — ASSESSMENT
[FreeTextEntry1] : Very pleasant 67-year-old gentleman who presents for follow-up of BPH, weak urinary stream, microscopic hematuria and urinary tract infection approximately 6 months ago\par -Urinalysis\par -Urine culture\par -CT demonstrates bladder wall thickening but no kidney stones, hydronephrosis, no renal masses\par -Cystoscopy negative in the past\par -Continue tamsulosin–refill sent to the pharmacy\par -Follow-up in 6 months if urine studies are unremarkable

## 2023-02-28 NOTE — HISTORY OF PRESENT ILLNESS
[FreeTextEntry1] : Very pleasant 67-year-old gentleman presents for follow-up of BPH with urinary obstruction, weak urinary stream, microscopic hematuria, history of UTI.  Approximately 6 months ago he was found to have a urinary tract infection and completed a course of antibiotics.  He was found to have 5 red blood cells per high-powered field on urinalysis at that time.  He underwent a CT scan at that time which demonstrated bladder wall thickening but no kidney stones, hydronephrosis, nor renal masses.  He underwent a cystoscopy which demonstrated no urothelial lesions.  He presents today for follow-up.

## 2023-03-01 LAB
APPEARANCE: CLEAR
BACTERIA: NEGATIVE
BILIRUBIN URINE: NEGATIVE
BLOOD URINE: NEGATIVE
COLOR: NORMAL
GLUCOSE QUALITATIVE U: NEGATIVE
HYALINE CASTS: 0 /LPF
KETONES URINE: NEGATIVE
LEUKOCYTE ESTERASE URINE: NEGATIVE
MICROSCOPIC-UA: NORMAL
NITRITE URINE: NEGATIVE
PH URINE: 6.5
PROTEIN URINE: NORMAL
RED BLOOD CELLS URINE: 1 /HPF
SPECIFIC GRAVITY URINE: 1.02
SQUAMOUS EPITHELIAL CELLS: 0 /HPF
UROBILINOGEN URINE: NORMAL
WHITE BLOOD CELLS URINE: 0 /HPF

## 2023-03-02 LAB — BACTERIA UR CULT: NORMAL

## 2023-03-21 ENCOUNTER — APPOINTMENT (OUTPATIENT)
Dept: INTERNAL MEDICINE | Facility: CLINIC | Age: 68
End: 2023-03-21
Payer: MEDICARE

## 2023-03-21 VITALS
TEMPERATURE: 98 F | HEART RATE: 60 BPM | BODY MASS INDEX: 31.5 KG/M2 | OXYGEN SATURATION: 97 % | HEIGHT: 70 IN | RESPIRATION RATE: 15 BRPM | WEIGHT: 220 LBS | SYSTOLIC BLOOD PRESSURE: 144 MMHG | DIASTOLIC BLOOD PRESSURE: 83 MMHG

## 2023-03-21 PROCEDURE — 99214 OFFICE O/P EST MOD 30 MIN: CPT

## 2023-03-21 RX ORDER — CIPROFLOXACIN HYDROCHLORIDE 500 MG/1
500 TABLET, FILM COATED ORAL TWICE DAILY
Qty: 14 | Refills: 0 | Status: DISCONTINUED | COMMUNITY
Start: 2022-08-01 | End: 2023-03-21

## 2023-03-21 NOTE — ASSESSMENT
[FreeTextEntry1] : 67 year old male found to have stable Essential Hypertension, Hypercholesterolemia, Chronic Hepatitis B, Vitamin D Deficiency, NAFLD, Cardiac Arrhythmia, Obesity, with the current prescription regimen as recommended, diet and life style modifications, as counseled. Prior results reviewed, interpreted and discussed with the patient during today's examination, as appropriate. Follow up, treatment plan and tests, as ordered.\par \par Total time spent : 30 minutes\par Including:\par Preparation prior to visit - Reviewing prior record, results of tests and Consultation Reports as applicable\par Conducting an appropriate H & P during today's encounter\par Appropriate orders for tests, medications and procedures, as applicable\par Counseling patient \par Note completion\par \par \par \par \par

## 2023-03-21 NOTE — HISTORY OF PRESENT ILLNESS
[de-identified] : 67 year old  male patient with history of stable Essential Hypertension, Hypercholesterolemia, Chronic Hepatitis B, Vitamin D Deficiency, NAFLD, history as stated, presented for follow up examination. Patient is compliant with all medications. Denies shortness of breath, chest pain or abdominal pains at this time. ROS as stated.\par

## 2023-03-21 NOTE — HEALTH RISK ASSESSMENT
[No] : In the past 12 months have you used drugs other than those required for medical reasons? No [No falls in past year] : Patient reported no falls in the past year [0] : 2) Feeling down, depressed, or hopeless: Not at all (0) [Former] : Former [de-identified] : Patient was recently evaluated by URO, findings and recommendations reviewed with the patient during today's examination. [[id number]] : 0

## 2023-03-22 LAB
25(OH)D3 SERPL-MCNC: 41.4 NG/ML
ALBUMIN SERPL ELPH-MCNC: 4.6 G/DL
ALP BLD-CCNC: 82 U/L
ALT SERPL-CCNC: 20 U/L
ANION GAP SERPL CALC-SCNC: 11 MMOL/L
APPEARANCE: CLEAR
AST SERPL-CCNC: 25 U/L
BACTERIA: NEGATIVE
BASOPHILS # BLD AUTO: 0.06 K/UL
BASOPHILS NFR BLD AUTO: 0.8 %
BILIRUB SERPL-MCNC: 1.4 MG/DL
BILIRUBIN URINE: NEGATIVE
BLOOD URINE: NEGATIVE
BUN SERPL-MCNC: 11 MG/DL
CALCIUM SERPL-MCNC: 9.8 MG/DL
CHLORIDE SERPL-SCNC: 105 MMOL/L
CHOLEST SERPL-MCNC: 224 MG/DL
CO2 SERPL-SCNC: 25 MMOL/L
COLOR: NORMAL
CREAT SERPL-MCNC: 1 MG/DL
CREAT SPEC-SCNC: 82 MG/DL
EGFR: 84 ML/MIN/1.73M2
EOSINOPHIL # BLD AUTO: 0.4 K/UL
EOSINOPHIL NFR BLD AUTO: 5.2 %
ESTIMATED AVERAGE GLUCOSE: 108 MG/DL
GGT SERPL-CCNC: 28 U/L
GLUCOSE QUALITATIVE U: NEGATIVE
GLUCOSE SERPL-MCNC: 98 MG/DL
HBA1C MFR BLD HPLC: 5.4 %
HCT VFR BLD CALC: 47.8 %
HDLC SERPL-MCNC: 49 MG/DL
HGB BLD-MCNC: 15.5 G/DL
HYALINE CASTS: 0 /LPF
IMM GRANULOCYTES NFR BLD AUTO: 0.4 %
KETONES URINE: NEGATIVE
LDLC SERPL CALC-MCNC: 152 MG/DL
LEUKOCYTE ESTERASE URINE: NEGATIVE
LYMPHOCYTES # BLD AUTO: 2.27 K/UL
LYMPHOCYTES NFR BLD AUTO: 29.5 %
MAN DIFF?: NORMAL
MCHC RBC-ENTMCNC: 30.8 PG
MCHC RBC-ENTMCNC: 32.4 GM/DL
MCV RBC AUTO: 95 FL
MICROALBUMIN 24H UR DL<=1MG/L-MCNC: <1.2 MG/DL
MICROALBUMIN/CREAT 24H UR-RTO: NORMAL MG/G
MICROSCOPIC-UA: NORMAL
MONOCYTES # BLD AUTO: 0.72 K/UL
MONOCYTES NFR BLD AUTO: 9.4 %
NEUTROPHILS # BLD AUTO: 4.21 K/UL
NEUTROPHILS NFR BLD AUTO: 54.7 %
NITRITE URINE: NEGATIVE
NONHDLC SERPL-MCNC: 175 MG/DL
PH URINE: 6.5
PLATELET # BLD AUTO: 177 K/UL
POTASSIUM SERPL-SCNC: 5.1 MMOL/L
PROT SERPL-MCNC: 7.3 G/DL
PROTEIN URINE: NEGATIVE
PSA FREE FLD-MCNC: 33 %
PSA FREE SERPL-MCNC: 0.27 NG/ML
PSA SERPL-MCNC: 0.82 NG/ML
RBC # BLD: 5.03 M/UL
RBC # FLD: 13.1 %
RED BLOOD CELLS URINE: 1 /HPF
SODIUM SERPL-SCNC: 141 MMOL/L
SPECIFIC GRAVITY URINE: 1.01
SQUAMOUS EPITHELIAL CELLS: 0 /HPF
TRIGL SERPL-MCNC: 116 MG/DL
TSH SERPL-ACNC: 2.17 UIU/ML
UROBILINOGEN URINE: NORMAL
WBC # FLD AUTO: 7.69 K/UL
WHITE BLOOD CELLS URINE: 0 /HPF

## 2023-03-24 LAB — HEMOCCULT STL QL IA: NEGATIVE

## 2023-08-09 ENCOUNTER — APPOINTMENT (OUTPATIENT)
Dept: INTERNAL MEDICINE | Facility: CLINIC | Age: 68
End: 2023-08-09
Payer: MEDICARE

## 2023-08-09 VITALS
WEIGHT: 217 LBS | TEMPERATURE: 98.1 F | HEIGHT: 70 IN | SYSTOLIC BLOOD PRESSURE: 127 MMHG | HEART RATE: 59 BPM | OXYGEN SATURATION: 97 % | BODY MASS INDEX: 31.07 KG/M2 | DIASTOLIC BLOOD PRESSURE: 78 MMHG

## 2023-08-09 DIAGNOSIS — R17 UNSPECIFIED JAUNDICE: ICD-10-CM

## 2023-08-09 PROCEDURE — 99214 OFFICE O/P EST MOD 30 MIN: CPT | Mod: 25

## 2023-08-09 NOTE — PHYSICAL EXAM
[Alert] : alert [Normal Voice/Communication] : normal voice/communication [Healthy Appearing] : healthy appearing [Sclera] : the sclera and conjunctiva were normal [Normal Lips/Gums] : the lips and gums were normal [Normal Appearance] : the appearance of the neck was normal [Heart Rate And Rhythm] : heart rate was normal and rhythm regular [Normal S1, S2] : normal S1 and S2 [None] : no edema [Obese] : obese [Soft, Nontender] : the abdomen was soft and nontender [No Mass] : no masses were palpated [No HSM] : no hepatosplenomegaly noted [Cervical Lymph Nodes Enlarged Posterior Bilaterally] : no posterior cervical lymphadenopathy [Cervical Lymph Nodes Enlarged Anterior Bilaterally] : no anterior cervical lymphadenopathy [No CVA Tenderness] : no CVA  tenderness [Abnormal Walk] : normal gait [No Clubbing, Cyanosis] : no clubbing or cyanosis of the fingernails [Normal Scalp] : inspection of the scalp showed no abnormalities [Thin Hair] : thin hair [Complexion Medium] : medium complexion [Skin Lesions 1] : Skin lesion: [Motor Exam] : the motor exam was normal [Normal] : oriented to person, place, and time

## 2023-08-09 NOTE — ASSESSMENT
[FreeTextEntry1] : 1  fatty liver  Fatty Liver: The patient denies any jaundice or pruritus. The patient denies any alcohol use. The patient denies taking large doses of nonsteroidal anti-inflammatory drugs or acetaminophen. The findings are suggestive of fatty liver. The patient and I had a long discussion regarding the risks of fatty liver progressing to cirrhosis. The patient was told of the possible increased risk of developing liver failure, cirrhosis, ascites, GI bleeding secondary to varices, hepatic encephalopathy, bleeding tendencies and liver cancer. The patient was told of the importance of follow-up. The patient was advised to follow up every 6 months for blood work and imaging studies. The patient agreed and will follow up. The patient was advised to lose weight. I recommend a trial of vitamin E supplementation for the fatty liver. If the liver enzymes remain elevated, the patient may require a trial of Pioglitazone for the fatty liver. I recommend avoid alcohol and hepato-toxic agents. The patient was also advised to avoid NSAIDs, Acetaminophen and any other hepatotoxic drugs. The patient was also advised not to share needles, razors, scissors, nail clippers, etc.. The patient is to continue close follow-up in our office for blood work and exams. If the liver enzymes remain elevated, the patient may require a CT guided liver biopsy to assess the liver parenchyma and for possible treatment. We had a long discussion regarding the risks and benefits of the procedure. The patient was told of the risks of bleeding, perforation, infections, emergency surgery and missing lesions. The patient agreed and will follow-up to reassess the symptoms Patient has been counseled on life style interventions, including but not limited to: weight loss (3-5% loss of body weight might improve fat in the liver, while 7-10% needed for potential improvement of other components, including inflammation and scarring of the liver, called fibrosis); healthy diet, avoiding added sugars, sodas, avoiding saturated fats, limiting sodium, avoiding alcohol; and on the importance of regular exercise (> 150 min/week moderate intensity aerobic exercise with at least 2x/week muscle strengthening or exercise as tolerated).  - I explained to patient the natural Hx of NAFLD.  2  chronic hep  b  no stigmata  found of liver disease will obtain us of abd  and  hep b  vviral  lad.    3.alcohol use   drinks  1-2 beers a month  9/12/2018 abdominal US reported fatty infiltration of the liver, normal  previously  chronic hepatitis B, eAb positive, with low HBV viremia (< 2000IU/ml), normal ALT, and no focal liver Most recent ALT WNL, Hep B DNA low (< 2000 IU/ml), and no fibrosis on Fibroscan (12/2021), thus did not meet criteria for treatmentmass on abdominal US (3/2021), treatment naive.  He had FibroScan in 2019 showing S1 (242 dB/m) steatosis and no fibrosis (F0-F1 5.8kPA) and in 12/2021 showing S2-3 steatosis (290dB/m), and no fibrosis (4.3 kPa).spleen or no ascites. normal GB and bile ducts.    FibroScan 3/2019 reported CAP of 242, and kPa of 5.8 (S1, F0-F1). - C/w q 6 months HCC surveillance, will get AFP (was WNL in 11/2021), last US abd 12/2021 showing hepatic steatosis, no focal mass, ordered repeat  - Patient has been counseled on prevention of HBV transmission, including but not limited to: not sharing toothbrushes, razors, injection equipment, glucose testing equipment, covering open cuts and scratches, using barrier protection (if sexual partner not immune), testing household and sexual contacts and vaccinating if not immune, not donating blood, organs or sperm. Patient has been counseled on life style interventions, including but not limited to: weight loss (3-5% loss of body weight might improve fat in the liver, while 7-10% needed for potential improvement of other components, including inflammation and scarring of the liver, called fibrosis); healthy diet, avoiding added sugars, sodas, avoiding saturated fats, limiting sodium, avoiding alcohol; and on the importance of regular exercise (> 150 min/week moderate intensity aerobic exercise with at least 2x/week muscle strengthening or exercise as tolerated).  3/22/23  total br  1.4  ast  25 alt  20 ak 82 4  bmi 31   Weight loss, exercise, and diet control were discussed and are highly encouraged. Treatment options were given such as, aqua therapy, and contacting a nutritionist. Recommended to use the elliptical, stationary bike, less use of treadmill. Mindful eating was explained to the patient Obesity is associated with worsening asthma, shortness of breath, and potential for cardiac disease, diabetes, and other underlying medical conditions.  pt should eat 60-70  gms of protein a day or 20-30 gms per meal This is fish chicken eggs lean meat such as chicken turkey pork and beef .  - Pt should not eat more than a 200 calorie snack  and make sure they are protein and fiber rich. he should eat 7 serving of protein, 12 servings of carbohydrates and 3 servings of non starchy vegetables and 4 servings of fat Dont eat unless you are physically hungry and never eat in front of a TV go to the kitchen table.  he should not eat more than a 1500 calories per day.

## 2023-08-09 NOTE — HISTORY OF PRESENT ILLNESS
[de-identified] : 9/16/16 [FreeTextEntry1] : 5/7/21 diverticulosis  hemorrhoids  transverse colon polypFinal Diagnosis  Transverse colon; biopsy: - Colonic mucosa with an increased population of acute and chronic inflammatory cells with focal cryptitis and healing surface erosion - Melanosis

## 2023-08-10 LAB
AFP-TM SERPL-MCNC: <1.8 NG/ML
ALBUMIN SERPL ELPH-MCNC: 4.7 G/DL
ALP BLD-CCNC: 78 U/L
ALT SERPL-CCNC: 19 U/L
AST SERPL-CCNC: 27 U/L
BILIRUB DIRECT SERPL-MCNC: 0.4 MG/DL
BILIRUB INDIRECT SERPL-MCNC: 1.3 MG/DL
BILIRUB SERPL-MCNC: 1.7 MG/DL
HBV DNA # SERPL NAA+PROBE: 119 IU/ML
HCV AB SER QL: NONREACTIVE
HCV S/CO RATIO: 0.1 S/CO
HEPB DNA PCR INT: DETECTED
HEPB DNA PCR LOG: 2.08 LOGIU/ML
PROT SERPL-MCNC: 7.5 G/DL

## 2023-08-14 ENCOUNTER — APPOINTMENT (OUTPATIENT)
Dept: ULTRASOUND IMAGING | Facility: HOSPITAL | Age: 68
End: 2023-08-14
Payer: MEDICARE

## 2023-08-14 ENCOUNTER — OUTPATIENT (OUTPATIENT)
Dept: OUTPATIENT SERVICES | Facility: HOSPITAL | Age: 68
LOS: 1 days | End: 2023-08-14
Payer: MEDICARE

## 2023-08-14 DIAGNOSIS — E66.9 OBESITY, UNSPECIFIED: ICD-10-CM

## 2023-08-14 DIAGNOSIS — B18.1 CHRONIC VIRAL HEPATITIS B WITHOUT DELTA-AGENT: ICD-10-CM

## 2023-08-14 DIAGNOSIS — K76.0 FATTY (CHANGE OF) LIVER, NOT ELSEWHERE CLASSIFIED: ICD-10-CM

## 2023-08-14 PROCEDURE — 76700 US EXAM ABDOM COMPLETE: CPT | Mod: 26

## 2023-08-14 PROCEDURE — 76700 US EXAM ABDOM COMPLETE: CPT

## 2023-08-15 LAB
FULL GENE SEQUENCE RESULT: NORMAL
INTERPRETATION PGDFRB: NORMAL
Lab: NORMAL
REF LAB TEST METHOD: NORMAL
REVIEWED BY: NORMAL
TA REPEAT RESULT: NORMAL
TEST PERFORMANCE INFO SPEC: NORMAL

## 2023-08-29 ENCOUNTER — APPOINTMENT (OUTPATIENT)
Dept: UROLOGY | Facility: CLINIC | Age: 68
End: 2023-08-29
Payer: MEDICARE

## 2023-08-29 VITALS
OXYGEN SATURATION: 96 % | HEIGHT: 70 IN | BODY MASS INDEX: 30.78 KG/M2 | SYSTOLIC BLOOD PRESSURE: 145 MMHG | TEMPERATURE: 97.8 F | HEART RATE: 62 BPM | DIASTOLIC BLOOD PRESSURE: 85 MMHG | WEIGHT: 215 LBS

## 2023-08-29 DIAGNOSIS — R31.29 OTHER MICROSCOPIC HEMATURIA: ICD-10-CM

## 2023-08-29 PROCEDURE — 99214 OFFICE O/P EST MOD 30 MIN: CPT

## 2023-08-29 NOTE — ASSESSMENT
[FreeTextEntry1] : Very pleasant 68-year-old gentleman who presents for follow-up of BPH, screening for prostate cancer, history of weak urinary stream -Continue tamsulosin–refill sent to the pharmacy -PSA 0.82 -Creatinine 1.0 -A1c 5.4% -Follow-up in 6 months with repeat PSA

## 2023-08-29 NOTE — HISTORY OF PRESENT ILLNESS
[FreeTextEntry1] : Very pleasant 68-year-old gentleman who presents for follow-up of BPH, weak urinary stream, screening for prostate cancer.  He reports nocturia 2 times per night.  At times this will be 3 times if he drinks a lot before going to bed.  He reports a strong urinary stream on tamsulosin.  He denies dysuria.  No hematuria.  No flank pain or suprapubic pain.  No other complaints.

## 2023-09-19 ENCOUNTER — APPOINTMENT (OUTPATIENT)
Dept: INTERNAL MEDICINE | Facility: CLINIC | Age: 68
End: 2023-09-19
Payer: MEDICARE

## 2023-09-19 VITALS
TEMPERATURE: 98 F | BODY MASS INDEX: 31.21 KG/M2 | WEIGHT: 218 LBS | HEIGHT: 70 IN | DIASTOLIC BLOOD PRESSURE: 83 MMHG | HEART RATE: 64 BPM | OXYGEN SATURATION: 95 % | RESPIRATION RATE: 16 BRPM | SYSTOLIC BLOOD PRESSURE: 151 MMHG

## 2023-09-19 DIAGNOSIS — E55.9 VITAMIN D DEFICIENCY, UNSPECIFIED: ICD-10-CM

## 2023-09-19 DIAGNOSIS — E66.9 OBESITY, UNSPECIFIED: ICD-10-CM

## 2023-09-19 PROCEDURE — 90662 IIV NO PRSV INCREASED AG IM: CPT

## 2023-09-19 PROCEDURE — 99214 OFFICE O/P EST MOD 30 MIN: CPT | Mod: 25

## 2023-09-19 PROCEDURE — G0008: CPT

## 2023-09-20 LAB
ALBUMIN SERPL ELPH-MCNC: 4.6 G/DL
ALP BLD-CCNC: 76 U/L
ALT SERPL-CCNC: 21 U/L
ANION GAP SERPL CALC-SCNC: 14 MMOL/L
AST SERPL-CCNC: 25 U/L
BASOPHILS # BLD AUTO: 0.07 K/UL
BASOPHILS NFR BLD AUTO: 0.9 %
BILIRUB SERPL-MCNC: 1.2 MG/DL
BUN SERPL-MCNC: 10 MG/DL
CALCIUM SERPL-MCNC: 9.3 MG/DL
CHLORIDE SERPL-SCNC: 104 MMOL/L
CHOLEST SERPL-MCNC: 151 MG/DL
CO2 SERPL-SCNC: 22 MMOL/L
CREAT SERPL-MCNC: 0.83 MG/DL
EGFR: 97 ML/MIN/1.73M2
EOSINOPHIL # BLD AUTO: 0.46 K/UL
EOSINOPHIL NFR BLD AUTO: 5.6 %
ESTIMATED AVERAGE GLUCOSE: 108 MG/DL
GGT SERPL-CCNC: 30 U/L
GLUCOSE SERPL-MCNC: 94 MG/DL
HBA1C MFR BLD HPLC: 5.4 %
HCT VFR BLD CALC: 45.5 %
HDLC SERPL-MCNC: 48 MG/DL
HGB BLD-MCNC: 15.5 G/DL
IMM GRANULOCYTES NFR BLD AUTO: 0.2 %
LDLC SERPL CALC-MCNC: 89 MG/DL
LYMPHOCYTES # BLD AUTO: 2.48 K/UL
LYMPHOCYTES NFR BLD AUTO: 30.1 %
MAN DIFF?: NORMAL
MCHC RBC-ENTMCNC: 31.6 PG
MCHC RBC-ENTMCNC: 34.1 GM/DL
MCV RBC AUTO: 92.7 FL
MONOCYTES # BLD AUTO: 0.79 K/UL
MONOCYTES NFR BLD AUTO: 9.6 %
NEUTROPHILS # BLD AUTO: 4.41 K/UL
NEUTROPHILS NFR BLD AUTO: 53.6 %
NONHDLC SERPL-MCNC: 103 MG/DL
PLATELET # BLD AUTO: 187 K/UL
POTASSIUM SERPL-SCNC: 4.5 MMOL/L
PROT SERPL-MCNC: 7.2 G/DL
RBC # BLD: 4.91 M/UL
RBC # FLD: 12.6 %
SODIUM SERPL-SCNC: 139 MMOL/L
TRIGL SERPL-MCNC: 68 MG/DL
WBC # FLD AUTO: 8.23 K/UL

## 2023-12-21 ENCOUNTER — APPOINTMENT (OUTPATIENT)
Dept: INTERNAL MEDICINE | Facility: CLINIC | Age: 68
End: 2023-12-21
Payer: MEDICARE

## 2023-12-21 VITALS
TEMPERATURE: 97.8 F | HEIGHT: 70 IN | SYSTOLIC BLOOD PRESSURE: 123 MMHG | WEIGHT: 218 LBS | OXYGEN SATURATION: 98 % | HEART RATE: 62 BPM | BODY MASS INDEX: 31.21 KG/M2 | DIASTOLIC BLOOD PRESSURE: 77 MMHG

## 2023-12-21 DIAGNOSIS — E80.4 GILBERT SYNDROME: ICD-10-CM

## 2023-12-21 PROCEDURE — 99214 OFFICE O/P EST MOD 30 MIN: CPT

## 2023-12-21 NOTE — ASSESSMENT
[FreeTextEntry1] : 1 suraj   Gilbert syndrome is an inherited disorder of the liver that results in an overabundance of a substance known as bilirubin. While some people with Gilbert syndrome develop yellowing of the skin or eyes, most people have no symptoms at all. Gilbert syndrome is not dangerous and does not cause long-term problems People with Gilbert syndrome have an inherited abnormality that causes reduced production of an enzyme involved in processing bilirubin.   Every affected person has two copies of the abnormal gene responsible for Gilbert syndrome. Because over half of the people in the general population have at least one abnormal copy of the gene, inheriting two abnormal copies (one from the mother and one from the father) is not uncommon. Interestingly, people who have only one copy may have slightly higher levels of unconjugated bilirubin but do not have Gilbert syndrome. For reasons that are not entirely understood, not all people who have two copies of the abnormal gene develop blood bilirubin levels that are high enough for the diagnosis of Gilbert syndrome.  GILBERT SYNDROME SYMPTOMS Most patients with Gilbert syndrome have no symptoms. The disorder is frequently diagnosed incidentally when a lab test done for another reason (such as a life insurance examination) shows an abnormally high level of unconjugated bilirubin.  When the level of unconjugated bilirubin rises beyond a certain point, the bilirubin pigment begins to discolor the whites of the eyes (making them appear light yellow). With even higher levels, the skin may also become yellow (jaundice). This potentially alarming appearance makes many people with Gilbert syndrome see their health care provider.  Levels of bilirubin can fluctuate in people with Gilbert syndrome. They may be highest during an infection (such as the flu), following periods of fasting, and during menstrual periods in some women. In addition, newborns with Gilbert syndrome may have higher levels of bilirubin and more persistent jaundice than newborns without. (See "Patient education: Jaundice in  infants (Beyond the Basics)".)  Gilbert syndrome may also become apparent when an affected person takes certain drugs that require the enzyme involved in bilirubin processing in the liver. A cancer drug called irinotecan is one example.ot necessary to treat Gilbert syndrome.No specific treatment is required for people with Gilbert syndrome. However, there is an increased risk of side effects from certain drugs that are broken down by the liver (such as acetaminophen and the chemotherapy drug, irinotecan).  2. felicitas livr  Fatty Liver: The patient denies any jaundice or pruritus. The patient denies any alcohol use. The patient denies taking large doses of nonsteroidal anti-inflammatory drugs or acetaminophen. The findings are suggestive of fatty liver. The patient and I had a long discussion regarding the risks of fatty liver progressing to cirrhosis. The patient was told of the possible increased risk of developing liver failure, cirrhosis, ascites, GI bleeding secondary to varices, hepatic encephalopathy, bleeding tendencies and liver cancer. The patient was told of the importance of follow-up. The patient was advised to follow up every 6 months for blood work and imaging studies. The patient agreed and will follow up. The patient was advised to lose weight. I recommend a trial of vitamin E supplementation for the fatty liver. If the liver enzymes remain elevated, the patient may require a trial of Pioglitazone for the fatty liver. I recommend avoid alcohol and hepato-toxic agents. The patient was also advised to avoid NSAIDs, Acetaminophen and any other hepatotoxic drugs. The patient was also advised not to share needles, razors, scissors, nail clippers, etc.. The patient is to continue close follow-up in our office for blood work and exams. If the liver enzymes remain elevated, the patient may require a CT guided liver biopsy to assess the liver parenchyma and for possible treatment. We had a long discussion regarding the risks and benefits of the procedure. The patient was told of the risks of bleeding, perforation, infections, emergency surgery and missing lesions. The patient agreed and will follow-up to reassess the symptoms Patient has been counseled on life style interventions, including but not limited to: weight loss (3-5% loss of body weight might improve fat in the liver, while 7-10% needed for potential improvement of other components, including inflammation and scarring of the liver, called fibrosis); healthy diet, avoiding added sugars, sodas, avoiding saturated fats, limiting sodium, avoiding alcohol; and on the importance of regular exercise (> 150 min/week moderate intensity aerobic exercise with at least 2x/week muscle strengthening or exercise as tolerated).  - I explained to patient the natural Hx of NAFLD.  3. chronic hep b  us of liver  no lesion steatosis   fu  pcr   4 lactose intolerance  doing well Lactose intolerance is a condition that makes it hard for your body to digest milk and foods made with milk (called dairy products). If you have lactose intolerance and you eat dairy products, you can get diarrhea, belly pain, and gas. Lactose intolerance can affect anyone. But it is most common among , , and black people. In people who do not have lactose intolerance, the body makes a protein called an "enzyme" that breaks down lactose, the main form of sugar found in milk. In people who do have lactose intolerance, the body either does not make enough of the enzyme, or the enzyme does not work as well as it should. Also, some infections, such as you might get with food poisoning, can damage the enzyme. But if that happens, the problem usually goes away within a few weeks. Luckily, people with lactose intolerance can take an enzyme supplement to help with their problem. What are the symptoms of lactose intolerance?The symptoms happen only after you eat dairy foods. They can include: ?Cramps or belly pain (usually around or below the belly button) ?Bloating (feeling like your belly is full of air) ?Gas ?Diarrhea (often it is bulky, foamy, and watery) ?Vomiting (this happens mostly in teens) Is there a test for lactose intolerance?Yes, there are 2 ways to test for lactose intolerance. One is a breathing test, and one is a blood test. The breathing test is more common. Your doctor or nurse will tell you how to prepare for your test. You will not be able to eat or drink anything for several hours before the test. Plus, you might have to change your medicines or stop smoking for a while before the test. ?Lactose hydrogen breath test  For this test, you drink a liquid that has lactose in it. Then you breathe into a special machine every 30 minutes. The machine measures how much hydrogen you breathe out. People who have lactose intolerance breathe out more hydrogen than normal. ?Lactose tolerance test  For this test, you drink a liquid that has lactose in it. The doctor or nurse will take blood samples from you when the test starts, and again 1 and 2 hours later. If your blood has low levels of sugar after you drink the lactose, it means you probably have lactose intolerance.  If you think you might have lactose intolerance, tell your doctor or nurse. He or she can ask you questions to make sure that there are no other problems. How is lactose intolerance treated?Treatment differs depending on how severe the problem is. But in general, treatment can include: ?Eating less dairy food ?Finding non-dairy sources of nutrients (such as calcium and vitamin D) and protein ?Taking an enzyme supplement that will help you break down dairy foods How do I reduce the amount of dairy foods I eat?You can start by cutting down but not stopping foods you know contain dairy. Dairy foods should be consumed with meals. Dairy foods include milk, cream, ice cream, yogurt, cheese, and butter. This table shows how much lactose is in some common dairy foods . Your doctor or nurse might suggest that you talk to a nutritionist to learn which foods have lactose. The nutritionist can also make sure that you get enough calcium and vitamin D in your diet. If you are really sensitive to dairy foods or lactose, you will also need to read the labels on everything you eat. Milk or lactose is sometimes added to foods you might not suspect, such as cereal, instant soups, and salad dressings. Check the ingredient list of foods for anything that might suggest lactose. Look for these words: ?Milk, "milk byproducts," "dry milk powder," and "dry milk solids" ?Lactose ?Whey (whey is milk that has gone sour) Although some medicines are made with lactose, most people who are lactose intolerant can handle the very small amount in medicines. Which enzyme supplement should I use?There are many enzyme supplements to choose from, including Lactaid (tablets or liquid), Lactrase, LactAce, Dairy Ease, and Lactrol. You should take the supplement right before you start eating. If you forget, you can take it during the meal, but it might not work as well. The important thing to know is that each product works a bit differently for each person. Plus, none of them can break down every last bit of lactose, so some people still have symptoms even with an enzyme supplement. Should I take calcium or vitamin D supplements?That depends on whether you completely avoid dairy foods. If you do, your doctor or nurse might recommend calcium supplements. He or she might also check your vitamin D levels to decide whether you should take supplements. Is lactose intolerance basically a food allergy?No. There are people who are allergic to milk and dairy foods. But the symptoms of a dairy allergy are often different from those of lactose intolerance. In the case of an allergy, the body reacts to the protein in milk, rather than to the sugar. Plus, allergies involve the body's infection-fighting system, called the immune system. Lactose intolerance does not. 5  hiatal hernia presently he is not having any symptoms we discussed long term use of ppi and h2 blockers will not take unless needed discussed Rule of 2's; pt should avoid eating too much; too fast; too spicy; too lousy; less than two hours before bed  -Things to avoid including overeating, spicy foods, tight clothing, eating within three hours of bed, this list is not all inclusive.  -For treatment of reflux, possible options discussed including diet control, H2 blockers, PPIs, as well as coating motility agents discussed as treatment options. Timing of meals and proximity of last meal to sleep were discussed. If symptoms persist, a formal gastrointestinal evaluation is needed.  Hiatal Hernia: The patient was advised to avoid late-night meals and dietary indiscretions. The patient was advised to avoid fried and fatty foods. The patient was advised to abide by an anti-GERD diet. The patient was given a pamphlet for anti-GERD. The patient and I reviewed the anti-GERD diet at length. I recommend continue on a trial of Pepcid 40 mg twice a day for 3 months for the symptoms.

## 2023-12-21 NOTE — PHYSICAL EXAM
[Alert] : alert [Normal Voice/Communication] : normal voice/communication [Sclera] : the sclera and conjunctiva were normal [Normal Lips/Gums] : the lips and gums were normal [Oropharynx] : the oropharynx was normal [Normal Appearance] : the appearance of the neck was normal [Heart Rate And Rhythm] : heart rate was normal and rhythm regular [Normal S1, S2] : normal S1 and S2 [None] : no edema [Soft, Nontender] : the abdomen was soft and nontender [No Mass] : no masses were palpated [Cervical Lymph Nodes Enlarged Posterior Bilaterally] : no posterior cervical lymphadenopathy [Cervical Lymph Nodes Enlarged Anterior Bilaterally] : no anterior cervical lymphadenopathy [No CVA Tenderness] : no CVA  tenderness [Abnormal Walk] : normal gait [No Clubbing, Cyanosis] : no clubbing or cyanosis of the fingernails [Involuntary Movements] : no involuntary movements were seen [No Joint Swelling] : no joint swelling seen [Motor Tone] : muscle strength and tone were normal [Normal Color / Pigmentation] : normal skin color and pigmentation [] : no rash [Normal Turgor] : normal skin turgor [No Focal Deficits] : no focal deficits [Motor Exam] : the motor exam was normal [Normal] : oriented to person, place, and time

## 2023-12-24 LAB
HBV DNA # SERPL NAA+PROBE: 193 IU/ML
HEPB DNA PCR INT: DETECTED
HEPB DNA PCR LOG: 2.29 LOGIU/ML

## 2024-01-29 ENCOUNTER — RX RENEWAL (OUTPATIENT)
Age: 69
End: 2024-01-29

## 2024-02-06 ENCOUNTER — APPOINTMENT (OUTPATIENT)
Dept: UROLOGY | Facility: CLINIC | Age: 69
End: 2024-02-06
Payer: MEDICARE

## 2024-02-06 VITALS
TEMPERATURE: 98.8 F | OXYGEN SATURATION: 98 % | HEIGHT: 70 IN | BODY MASS INDEX: 31.21 KG/M2 | HEART RATE: 64 BPM | SYSTOLIC BLOOD PRESSURE: 132 MMHG | WEIGHT: 218 LBS | DIASTOLIC BLOOD PRESSURE: 79 MMHG

## 2024-02-06 DIAGNOSIS — R39.12 POOR URINARY STREAM: ICD-10-CM

## 2024-02-06 PROCEDURE — 99214 OFFICE O/P EST MOD 30 MIN: CPT

## 2024-02-06 PROCEDURE — G2211 COMPLEX E/M VISIT ADD ON: CPT

## 2024-02-06 RX ORDER — TAMSULOSIN HYDROCHLORIDE 0.4 MG/1
0.4 CAPSULE ORAL
Qty: 90 | Refills: 1 | Status: ACTIVE | COMMUNITY
Start: 2022-10-25 | End: 1900-01-01

## 2024-02-06 NOTE — HISTORY OF PRESENT ILLNESS
[FreeTextEntry1] : Very pleasant 68-year-old gentleman who presents for follow-up of BPH, weak urinary stream, screen for prostate cancer.  He feels well.  He denies dysuria.  No hematuria.  No problems over the last 6 months.  He continues to take tamsulosin and reports a significant improvement in his urinary symptoms on the medication.  No other complaints.
30 y/o female with pmhx of L4-L5 rebeca laminectomy, discectomy (4/2022), prior cholecystectomy 2020 presents to the ER with 1 day history of chest and abdominal pain described as burning starting from epigastrum and radiating up. Admitted for further evaluation.     Abdominal pain  - Described as a burning epigastric pain radiating to chest. Differential includes PUD/gastritis vs biliary colic given mild dilatation of CBD and elevated LFTs (bili higher than previously)  - Lipase wnl   - UA: positive urobilinogen   - US abd- Minimally dilated common bile duct  - MRCP: S/p cholecystectomy. Normal biliary tree without  choledocholithiasis.  - Elevated LFTs, downtrending   - S/p IV PPI transitioned to PO PPI   - Abdominal pain improved, stable for dc w/ outpt PCP f/u     Chest pain  - Burning pain radiating from epigastric area and also pleuritic. Trop < 6, EKG NSR. CTA nondiagnostic study given respiratory motion artifact, however Ddimer negative.   - Likely related to above, possible PUD. continued PPI   - Resolved     Back pain  - Pt s/p lami and discectomy. on gabapentin prn for chronic pain, continued    On 6/10/22 this case was reviewed with Dr. Lott. The patient is medically stable and optimized for discharge. All medications were reviewed and prescriptions were sent to mutually agreed upon pharmacy.

## 2024-02-06 NOTE — ASSESSMENT
[FreeTextEntry1] : Very pleasant 68-year-old gentleman who presents for follow-up of BPH with urinary obstruction, weak urinary stream, screening for prostate cancer -PSA 0.82 -Creatinine 0.83 -A1c 5.4% -Continue tamsulosin-refill sent to the pharmacy -Follow-up in 6 months  Patient is being seen today for evaluation and management of a chronic and longitudinal ongoing condition and I am of the primary treating physician

## 2024-02-27 ENCOUNTER — RX RENEWAL (OUTPATIENT)
Age: 69
End: 2024-02-27

## 2024-02-27 RX ORDER — FAMOTIDINE 20 MG/1
20 TABLET, FILM COATED ORAL
Qty: 180 | Refills: 3 | Status: ACTIVE | COMMUNITY
Start: 2019-08-20 | End: 1900-01-01

## 2024-03-07 RX ORDER — ATORVASTATIN CALCIUM 20 MG/1
20 TABLET, FILM COATED ORAL
Qty: 90 | Refills: 3 | Status: ACTIVE | COMMUNITY
Start: 2021-03-23 | End: 1900-01-01

## 2024-03-19 ENCOUNTER — NON-APPOINTMENT (OUTPATIENT)
Age: 69
End: 2024-03-19

## 2024-03-19 ENCOUNTER — APPOINTMENT (OUTPATIENT)
Dept: INTERNAL MEDICINE | Facility: CLINIC | Age: 69
End: 2024-03-19
Payer: MEDICARE

## 2024-03-19 VITALS
TEMPERATURE: 98 F | HEIGHT: 70 IN | BODY MASS INDEX: 31.92 KG/M2 | WEIGHT: 223 LBS | DIASTOLIC BLOOD PRESSURE: 81 MMHG | RESPIRATION RATE: 16 BRPM | OXYGEN SATURATION: 98 % | SYSTOLIC BLOOD PRESSURE: 132 MMHG | HEART RATE: 58 BPM

## 2024-03-19 DIAGNOSIS — Z12.2 ENCOUNTER FOR SCREENING FOR MALIGNANT NEOPLASM OF RESPIRATORY ORGANS: ICD-10-CM

## 2024-03-19 PROCEDURE — 93000 ELECTROCARDIOGRAM COMPLETE: CPT

## 2024-03-19 PROCEDURE — 99214 OFFICE O/P EST MOD 30 MIN: CPT | Mod: 25

## 2024-03-19 RX ORDER — AMLODIPINE BESYLATE 5 MG/1
5 TABLET ORAL
Qty: 90 | Refills: 3 | Status: ACTIVE | COMMUNITY
Start: 2021-03-23 | End: 1900-01-01

## 2024-03-22 NOTE — PHYSICAL EXAM
[General Appearance - Well Developed] : well developed [Normal Appearance] : normal appearance [Well Groomed] : well groomed [General Appearance - Well Nourished] : well nourished [No Deformities] : no deformities [General Appearance - In No Acute Distress] : no acute distress [Normal Conjunctiva] : the conjunctiva exhibited no abnormalities [Eyelids - No Xanthelasma] : the eyelids demonstrated no xanthelasmas [Normal Oral Mucosa] : normal oral mucosa [No Oral Pallor] : no oral pallor [No Oral Cyanosis] : no oral cyanosis [Normal Jugular Venous A Waves Present] : normal jugular venous A waves present [Normal Jugular Venous V Waves Present] : normal jugular venous V waves present [No Jugular Venous Ryan A Waves] : no jugular venous ryan A waves [Heart Rate And Rhythm] : heart rate and rhythm were normal [Heart Sounds] : normal S1 and S2 [Murmurs] : no murmurs present [Respiration, Rhythm And Depth] : normal respiratory rhythm and effort [Exaggerated Use Of Accessory Muscles For Inspiration] : no accessory muscle use [Auscultation Breath Sounds / Voice Sounds] : lungs were clear to auscultation bilaterally [Abdomen Soft] : soft [Abdomen Tenderness] : non-tender [Abdomen Mass (___ Cm)] : no abdominal mass palpated not applicable [Abnormal Walk] : normal gait [Gait - Sufficient For Exercise Testing] : the gait was sufficient for exercise testing [Nail Clubbing] : no clubbing of the fingernails [Cyanosis, Localized] : no localized cyanosis [Petechial Hemorrhages (___cm)] : no petechial hemorrhages [Skin Color & Pigmentation] : normal skin color and pigmentation [] : no rash [No Venous Stasis] : no venous stasis [Skin Lesions] : no skin lesions [No Skin Ulcers] : no skin ulcer [No Xanthoma] : no  xanthoma was observed [Oriented To Time, Place, And Person] : oriented to person, place, and time [Affect] : the affect was normal [Mood] : the mood was normal [No Anxiety] : not feeling anxious

## 2024-04-12 ENCOUNTER — APPOINTMENT (OUTPATIENT)
Dept: PULMONOLOGY | Facility: CLINIC | Age: 69
End: 2024-04-12
Payer: MEDICARE

## 2024-04-12 VITALS
OXYGEN SATURATION: 95 % | TEMPERATURE: 98 F | HEIGHT: 70 IN | BODY MASS INDEX: 32.35 KG/M2 | SYSTOLIC BLOOD PRESSURE: 133 MMHG | DIASTOLIC BLOOD PRESSURE: 79 MMHG | HEART RATE: 63 BPM | WEIGHT: 226 LBS

## 2024-04-12 PROCEDURE — 99204 OFFICE O/P NEW MOD 45 MIN: CPT

## 2024-04-12 PROCEDURE — G2211 COMPLEX E/M VISIT ADD ON: CPT

## 2024-04-12 NOTE — PHYSICAL EXAM
[No Acute Distress] : no acute distress [Normal Oropharynx] : normal oropharynx [II] : Mallampati Class: II [Normal Appearance] : normal appearance [Normal Rate/Rhythm] : normal rate/rhythm [Normal S1, S2] : normal s1, s2 [No Resp Distress] : no resp distress [Clear to Auscultation Bilaterally] : clear to auscultation bilaterally [No Abnormalities] : no abnormalities [Normal Gait] : normal gait [No Clubbing] : no clubbing [No Edema] : no edema [Normal Color/ Pigmentation] : normal color/ pigmentation [No Focal Deficits] : no focal deficits [Oriented x3] : oriented x3 [Normal Affect] : normal affect

## 2024-04-12 NOTE — CONSULT LETTER
[Dear  ___] : Dear  [unfilled], [Consult Letter:] : I had the pleasure of evaluating your patient, [unfilled]. [Please see my note below.] : Please see my note below. [Consult Closing:] : Thank you very much for allowing me to participate in the care of this patient.  If you have any questions, please do not hesitate to contact me. [Sincerely,] : Sincerely, [FreeTextEntry3] : Pedro Ventura,  Pulmonary & Critical Care Medicine [facility]

## 2024-04-12 NOTE — ASSESSMENT
"    10/21/2020         RE: Lisset MITCHELL Chi  [address] [facility] [address]        Dear Colleague,    Thank you for referring your patient, Lisset MITCHELL Chi, to the [facility] [place]. Please see a copy of my visit note below.          [facility] FOLLOW-UP VISIT 10/21/2020    Lisset MITCHELL Chi's chief complaint for this visit includes:  Chief Complaint   Patient presents with     Follow Up     Follow up for back, neck, shoulder pain     PCP: Adriana Oconnor    Referring Provider:  Kristi Rodriguez NP  [address] [place] [address]    /76   Pulse 70   Ht 1.575 m (5' 2\")   LMP 12/25/2013   BMI 23.96 kg/m    Data Unavailable       Interval History:     Follow up reason: Follow up for back, neck, shoulder and tailbone pain     Date of injury: No injury     Date last seen: Virtual visit 9/9/20    Following Therapeutic Plan: No     Pain: Unchanged    Function: Unchanged    Interval History: Discontinued taking Diclofenac      Medical History:    Any recent changes to your medical history? No    Any new medication prescribed since last visit? No    Review of Systems:    Do you have fever, chills, weight loss? No    Do you have any vision problems? No    Do you have any chest pain or edema? No    Do you have any shortness of breath or wheezing?  No    Do you have stomach problems? No    Do you have any urinary track issues? No    Do you have any numbness or focal weakness? No    Do you have diabetes? No    Do you have problems with bleeding or clotting? No    Do you have an rashes or other skin lesions? No      HISTORY OF PRESENT ILLNESS  Ms. Gates is a pleasant 48 year old year old female who presents to clinic today with ongoing low back pain and neck pain  Lisset explains that she has had some improvement from medications, but continues to have pain in these areas  Location: neck and low back  Quality:  achy pain    Severity: 7/10 at " [FreeTextEntry1] : ~age~yo man and former smoker w/ PMH HTN, NAFLD, emphysema, chronic HBV referred by PMD for mgmt of emphysema and lung CA screening.   Data Reviewed: LDCT (PACS, 7/16/21) - mild emphysema; LUNG RADS 1 Prior PMD charts (Dr. Montes) Medication reconciliation  Impression: #Emphysema #Former smoker  Plan: - full PFT at next available RT appointment to establish baseline lung function - re-establish LDCT Lung CA screening as he is overdue for annual f/u (last 7/2021) - hold off empiric inhaled therapy at this time - encouraged continued exercise and exertional activity to maintain cardiopulmonary fitness - all questions answered worst    Duration: past year  Timing: occurs intermittently  Context: occurs while exercising and lifting  Modifying factors:  resting and non-use makes it better, movement and use makes it worse  Associated signs & symptoms: neck pain radiates into right upper back and shoulder  Low back pain radiates down both legs    MEDICAL HISTORY  Patient Active Problem List   Diagnosis     Irritable bowel syndrome     Premature ovarian failure     CARDIOVASCULAR SCREENING; LDL GOAL LESS THAN 160     Mild persistent asthma without complication     Trochanteric bursitis of both hips     Right knee pain     Adjustment disorder with depressed mood       Current Outpatient Medications   Medication Sig Dispense Refill     albuterol (PROAIR HFA/PROVENTIL HFA/VENTOLIN HFA) 108 (90 Base) MCG/ACT inhaler Inhale 2 puffs into the lungs every 6 hours 18 g 3     Calcium Carbonate-Vitamin D (CALCIUM + D PO) Take 600 mg by mouth.       FEXOFENADINE  MG OR TABS 1 TABLET DAILY        FLOVENT  MCG/ACT inhaler TAKE 2 PUFFS BY MOUTH TWICE A DAY 36 Inhaler 0     fluticasone (FLONASE) 50 MCG/ACT nasal spray Spray 2 sprays into both nostrils daily       MULTIPLE VITAMIN PO        tiZANidine (ZANAFLEX) 4 MG tablet Take 1-2 tablets (4-8 mg) by mouth nightly as needed 90 tablet 1     Turmeric Curcumin 500 MG CAPS Take 500 mg by mouth daily       VITAMIN D, CHOLECALCIFEROL, PO Take by mouth daily       diclofenac (VOLTAREN) 75 MG EC tablet Take 1 tablet (75 mg) by mouth 2 times daily as needed (Patient not taking: Reported on 10/21/2020) 40 tablet 1     diclofenac (VOLTAREN) 75 MG EC tablet Take 1 tablet (75 mg) by mouth 2 times daily (Patient not taking: Reported on 10/21/2020) 40 tablet 1       Allergies   Allergen Reactions     No Known Allergies        Family History   Problem Relation Age of Onset     Thyroid Disease Mother      Diabetes Mother         type2     Eye Disorder Mother         cataracts     Gastrointestinal Disease Mother          hep a/has to have her throat stretched     Respiratory Mother         sleep apnea     Heart Disease Father 60     C.A.D. Paternal Grandfather 30        age 30, then again in his 80's     Diabetes Maternal Grandfather      Cancer Maternal Grandfather      Thyroid Disease Maternal Grandmother      Gynecology Maternal Grandmother         on bcp to keep period regular, a small uterus     Heart Disease Maternal Grandmother         tachycardia     Allergies Daughter         milk     Asthma Daughter      Social History     Socioeconomic History     Marital status:      Spouse name: Not on file     Number of children: 2     Years of education: Not on file     Highest education level: Not on file   Occupational History     Employer: [employer]   Social Needs     Financial resource strain: Not on file     Food insecurity     Worry: Not on file     Inability: Not on file     Transportation needs     Medical: Not on file     Non-medical: Not on file   Tobacco Use     Smoking status: Never Smoker     Smokeless tobacco: Never Used   Substance and Sexual Activity     Alcohol use: Yes     Comment: social     Drug use: No     Sexual activity: Yes     Partners: Male     Birth control/protection: Surgical     Comment:  had vasectomy   Lifestyle     Physical activity     Days per week: Not on file     Minutes per session: Not on file     Stress: Not on file   Relationships     Social connections     Talks on phone: Not on file     Gets together: Not on file     Attends Shinto service: Not on file     Active member of club or organization: Not on file     Attends meetings of clubs or organizations: Not on file     Relationship status: Not on file     Intimate partner violence     Fear of current or ex partner: Not on file     Emotionally abused: Not on file     Physically abused: Not on file     Forced sexual activity: Not on file   Other Topics Concern      Service No     Blood Transfusions  "Not Asked     Caffeine Concern No     Occupational Exposure No     Hobby Hazards Not Asked     Sleep Concern No     Stress Concern Yes     Weight Concern Yes     Comment: gains easily     Special Diet No     Back Care Yes     Exercise Yes     Comment: irregularly     Bike Helmet Not Asked     Comment: doesn't bike     Seat Belt No     Self-Exams No     Parent/sibling w/ CABG, MI or angioplasty before 65F 55M? No   Social History Narrative    Caffeine intake/servings daily - 0-1    Calcium intake/servings daily - 2-3    Exercise 2-3  times weekly - describe aerobics    Sunscreen used - Yes    Seatbelts used - Yes    Guns stored in the home - No    Self Breast Exam - Yes    Pap test up to date -  Yes    Eye exam up to date -  Yes    Dental exam up to date -  Yes    DEXA scan up to date -  Not Applicable    Flex Sig/Colonoscopy up to date -  Not Applicable    Mammography up to date -  Not Applicable    Immunizations reviewed and up to date - Yes    Abuse: Current or Past (Physical, Sexual or Emotional) - None current, past    Do you feel safe in your environment - Yes    Do you cope well with stress - Yes    Do you suffer from insomnia - No    Last updated by: Estefani Moraes MA 5/5/2010               Additional medical/Social/Surgical histories reviewed in [facility] and updated as appropriate.     REVIEW OF SYSTEMS (10/21/2020)  10 point ROS of systems including Constitutional, Eyes, Respiratory, Cardiovascular, Gastroenterology, Genitourinary, Integumentary, Musculoskeletal, Psychiatric, Allergic/Immunologic were all negative except for pertinent positives noted in my HPI.     PHYSICAL EXAM  Vitals:    10/21/20 0834   BP: 108/76   Pulse: 70   Height: 1.575 m (5' 2\")     Vital Signs: /76   Pulse 70   Ht 1.575 m (5' 2\")   LMP 12/25/2013   BMI 23.96 kg/m   Patient declined being weighed. Body mass index is 23.96 kg/m .    General  - normal appearance, in no obvious distress  HEENT  - conjunctivae not injected, moist " mucous membranes, normocephalic/atraumatic head, ears normal appearance, no lesions, mouth normal appearance, no scars, normal dentition and teeth present  CV  - normal peripheral perfusion  Pulm  - normal respiratory pattern, non-labored    Musculoskeletal - CERVICAL SPINE  - stance and posture: normal gait without limp, no obvious leg length discrepancy, normal heel and toe walk, normal balance, slightly forward shoulders, head balanced normally on trunk  - inspection: normal bone and joint alignment, no obvious kyphosis  - palpation: no paravertebral or bony tenderness, except at base of neck and trapezius muscles  - ROM: normal and painless flexion, extension, left and right sidebending and rotation with some discomfort  - strength: upper extremities 5/5 in all planes  - special tests:  (+) spurlings    Neuro  - biceps, triceps, supinator DTRs 2+ bilaterally, no sensory or motor deficit, grossly normal coordination, normal muscle tone  Skin  - no ecchymosis, erythema, warmth, or induration, no obvious rash  Psych  - interactive, appropriate, normal mood and affect  Lumbar; has pain with flexion and extension, positive SLR  ASSESSMENT & PLAN  49 yo female with lumbar disc herniation, radicular pain and cervical disc herniations, not improved  Reviewed MRI lumbar: has disc herniation/budlge at L4/5  Ordered CAMMY  Reviewed cervical MRI; shows disc herniations  Order CAMMY  Cont. tizanadine PRN  Ibuprofen PRN  Medrol pack   F/u after ESIs    Appropriate PPE was utilized for prevention of spread of Covid-19.  Floyd Larsen MD, [facility]        Again, thank you for allowing me to participate in the care of your patient.        Sincerely,        Floyd Larsen MD

## 2024-04-12 NOTE — REASON FOR VISIT
[Initial] : an initial visit [Emphysema] : emphysema [[employer] ] : provided by [employer]   [TextBox_13] : Dr. Rashel Montes [Interpreters_IDNumber] : [id number] [Interpreters_FullName] : Jarrod [TWNoteComboBox1] : [place]

## 2024-04-12 NOTE — HISTORY OF PRESENT ILLNESS
[Lung Cancer Screening] : Patient underwent lung cancer screening [1] : 1 [Former] : former [Never] : never [TextBox_4] : ~age~yo man and former smoker w/ PMH HTN, NAFLD, emphysema, chronic HBV referred by PMD for mgmt of emphysema and lung CA screening.   Was previously enrolled in [facility] LDCT program. Lost to follow-up. Last CT 7/2021 (LUNG RADS 1). Feels well today. Denies any specific respiratory complaints. Said he was recommended to establish care by his primary care doctor. Denies dyspnea at rest with exertion. Able to perform ADLs and exertional activity without limitation. Sometimes has cough first thing in the morning -- can be dry or productive. Does not cough throughout the day usually. Describes sleep as adequate and without issue.   SH: Works as  Quit smoking around 2009/2010, previously smoked 1-1.5 packs daily for most of his adult life  [TextBox_11] : 1.5 [TextBox_13] : 30 [YearQuit] : 2010 [TextBox_12] : 06/16 [TextBox_27] : 06/17 [TextBox_42] : 07/21 [TextBox_52] : 6

## 2024-04-15 ENCOUNTER — NON-APPOINTMENT (OUTPATIENT)
Age: 69
End: 2024-04-15

## 2024-04-15 VITALS — HEIGHT: 70 IN | WEIGHT: 226 LBS | BODY MASS INDEX: 32.35 KG/M2

## 2024-04-15 DIAGNOSIS — Z87.891 PERSONAL HISTORY OF NICOTINE DEPENDENCE: ICD-10-CM

## 2024-04-15 NOTE — HISTORY OF PRESENT ILLNESS
[Former] : Former [TextBox_13] : Referred by Dr. Pedro Ventura He is a former smoker with a 45 pack year smoking history (1.5PPD x 30 years).  He quit in 2010 [YearQuit] : 2010 [PacksperYear] : 45

## 2024-04-17 ENCOUNTER — APPOINTMENT (OUTPATIENT)
Dept: PULMONOLOGY | Facility: CLINIC | Age: 69
End: 2024-04-17
Payer: MEDICARE

## 2024-04-17 VITALS
BODY MASS INDEX: 32.21 KG/M2 | HEIGHT: 70 IN | DIASTOLIC BLOOD PRESSURE: 85 MMHG | HEART RATE: 61 BPM | SYSTOLIC BLOOD PRESSURE: 127 MMHG | WEIGHT: 225 LBS | OXYGEN SATURATION: 97 %

## 2024-04-17 PROCEDURE — 94729 DIFFUSING CAPACITY: CPT

## 2024-04-17 PROCEDURE — 94726 PLETHYSMOGRAPHY LUNG VOLUMES: CPT

## 2024-04-17 PROCEDURE — 94010 BREATHING CAPACITY TEST: CPT

## 2024-04-25 ENCOUNTER — OUTPATIENT (OUTPATIENT)
Dept: OUTPATIENT SERVICES | Facility: HOSPITAL | Age: 69
LOS: 1 days | End: 2024-04-25
Payer: COMMERCIAL

## 2024-04-25 ENCOUNTER — RESULT REVIEW (OUTPATIENT)
Age: 69
End: 2024-04-25

## 2024-04-25 ENCOUNTER — APPOINTMENT (OUTPATIENT)
Dept: CT IMAGING | Facility: IMAGING CENTER | Age: 69
End: 2024-04-25
Payer: MEDICARE

## 2024-04-25 DIAGNOSIS — Z87.891 PERSONAL HISTORY OF NICOTINE DEPENDENCE: ICD-10-CM

## 2024-04-25 PROCEDURE — 71271 CT THORAX LUNG CANCER SCR C-: CPT | Mod: 26

## 2024-04-25 PROCEDURE — 71271 CT THORAX LUNG CANCER SCR C-: CPT

## 2024-05-13 ENCOUNTER — NON-APPOINTMENT (OUTPATIENT)
Age: 69
End: 2024-05-13

## 2024-05-13 ENCOUNTER — APPOINTMENT (OUTPATIENT)
Dept: INTERNAL MEDICINE | Facility: CLINIC | Age: 69
End: 2024-05-13
Payer: MEDICARE

## 2024-05-13 VITALS
TEMPERATURE: 96.8 F | BODY MASS INDEX: 32.07 KG/M2 | DIASTOLIC BLOOD PRESSURE: 83 MMHG | SYSTOLIC BLOOD PRESSURE: 134 MMHG | OXYGEN SATURATION: 97 % | WEIGHT: 224 LBS | HEART RATE: 66 BPM | HEIGHT: 70 IN

## 2024-05-13 VITALS — RESPIRATION RATE: 17 BRPM

## 2024-05-13 DIAGNOSIS — E73.9 LACTOSE INTOLERANCE, UNSPECIFIED: ICD-10-CM

## 2024-05-13 DIAGNOSIS — K44.9 DIAPHRAGMATIC HERNIA W/OUT OBSTRUCTION OR GANGRENE: ICD-10-CM

## 2024-05-13 DIAGNOSIS — J43.9 EMPHYSEMA, UNSPECIFIED: ICD-10-CM

## 2024-05-13 DIAGNOSIS — I49.3 VENTRICULAR PREMATURE DEPOLARIZATION: ICD-10-CM

## 2024-05-13 PROCEDURE — 99214 OFFICE O/P EST MOD 30 MIN: CPT | Mod: 25

## 2024-05-13 RX ORDER — LACTASE 9000 UNIT
9000 TABLET,CHEWABLE ORAL
Qty: 120 | Refills: 6 | Status: ACTIVE | COMMUNITY
Start: 2024-05-13 | End: 1900-01-01

## 2024-05-13 NOTE — ASSESSMENT
[FreeTextEntry1] :  1 fatty liver Fatty Liver: The patient denies any jaundice or pruritus. The patient denies any alcohol use. The patient denies taking large doses of nonsteroidal anti-inflammatory drugs or acetaminophen. The findings are suggestive of fatty liver. The patient and I had a long discussion regarding the risks of fatty liver progressing to cirrhosis. The patient was told of the possible increased risk of developing liver failure, cirrhosis, ascites, GI bleeding secondary to varices, hepatic encephalopathy, bleeding tendencies and liver cancer. The patient was told of the importance of follow-up. The patient was advised to follow up every 6 months for blood work and imaging studies. The patient agreed and will follow up. The patient was advised to lose weight. I recommend a trial of vitamin E supplementation for the fatty liver. If the liver enzymes remain elevated, the patient may require a trial of Pioglitazone for the fatty liver. I recommend avoid alcohol and hepato-toxic agents. The patient was also advised to avoid NSAIDs, Acetaminophen and any other hepatotoxic drugs. The patient was also advised not to share needles, razors, scissors, nail clippers, etc.. The patient is to continue close follow-up in our office for blood work and exams. If the liver enzymes remain elevated, the patient may require a CT guided liver biopsy to assess the liver parenchyma and for possible treatment. We had a long discussion regarding the risks and benefits of the procedure. The patient was told of the risks of bleeding, perforation, infections, emergency surgery and missing lesions. The patient agreed and will follow-up to reassess the symptoms Patient has been counseled on life style interventions, including but not limited to: weight loss (3-5% loss of body weight might improve fat in the liver, while 7-10% needed for potential improvement of other components, including inflammation and scarring of the liver, called fibrosis); healthy diet, avoiding added sugars, sodas, avoiding saturated fats, limiting sodium, avoiding alcohol; and on the importance of regular exercise (> 150 min/week moderate intensity aerobic exercise with at least 2x/week muscle strengthening or exercise as tolerated).  - I explained to patient the natural Hx of NAFLD.  2 chronic hep b  viral load is low  will repeat us liver and fibroscan   3. cardiac arrhythmia  ekg   refer to see Dr Fleming  in fu  ekg  rate 64/min pr186 qt 418/423 rsr V1  I didnt capture the pvcs and arrhythmia  4.  lactose intolerance  Lactose intolerance is a condition that makes it hard for your body to digest milk and foods made with milk (called dairy products). If you have lactose intolerance and you eat dairy products, you can get diarrhea, belly pain, and gas. Lactose intolerance can affect anyone. But it is most common among , , and black people. In people who do not have lactose intolerance, the body makes a protein called an "enzyme" that breaks down lactose, the main form of sugar found in milk. In people who do have lactose intolerance, the body either does not make enough of the enzyme, or the enzyme does not work as well as it should. Also, some infections, such as you might get with food poisoning, can damage the enzyme. But if that happens, the problem usually goes away within a few weeks. Luckily, people with lactose intolerance can take an enzyme supplement to help with their problem. What are the symptoms of lactose intolerance?The symptoms happen only after you eat dairy foods. They can include: ?Cramps or belly pain (usually around or below the belly button) ?Bloating (feeling like your belly is full of air) ?Gas ?Diarrhea (often it is bulky, foamy, and watery) ?Vomiting (this happens mostly in teens) Is there a test for lactose intolerance?Yes, there are 2 ways to test for lactose intolerance. One is a breathing test, and one is a blood test. The breathing test is more common. Your doctor or nurse will tell you how to prepare for your test. You will not be able to eat or drink anything for several hours before the test. Plus, you might have to change your medicines or stop smoking for a while before the test. ?Lactose hydrogen breath test  For this test, you drink a liquid that has lactose in it. Then you breathe into a special machine every 30 minutes. The machine measures how much hydrogen you breathe out. People who have lactose intolerance breathe out more hydrogen than normal. ?Lactose tolerance test  For this test, you drink a liquid that has lactose in it. The doctor or nurse will take blood samples from you when the test starts, and again 1 and 2 hours later. If your blood has low levels of sugar after you drink the lactose, it means you probably have lactose intolerance.  If you think you might have lactose intolerance, tell your doctor or nurse. He or she can ask you questions to make sure that there are no other problems. How is lactose intolerance treated?Treatment differs depending on how severe the problem is. But in general, treatment can include: ?Eating less dairy food ?Finding non-dairy sources of nutrients (such as calcium and vitamin D) and protein ?Taking an enzyme supplement that will help you break down dairy foods How do I reduce the amount of dairy foods I eat?You can start by cutting down but not stopping foods you know contain dairy. Dairy foods should be consumed with meals. Dairy foods include milk, cream, ice cream, yogurt, cheese, and butter. This table shows how much lactose is in some common dairy foods . Your doctor or nurse might suggest that you talk to a nutritionist to learn which foods have lactose. The nutritionist can also make sure that you get enough calcium and vitamin D in your diet. If you are really sensitive to dairy foods or lactose, you will also need to read the labels on everything you eat. Milk or lactose is sometimes added to foods you might not suspect, such as cereal, instant soups, and salad dressings. Check the ingredient list of foods for anything that might suggest lactose. Look for these words: ?Milk, "milk byproducts," "dry milk powder," and "dry milk solids" ?Lactose ?Whey (whey is milk that has gone sour) Although some medicines are made with lactose, most people who are lactose intolerant can handle the very small amount in medicines. Which enzyme supplement should I use?There are many enzyme supplements to choose from, including Lactaid (tablets or liquid), Lactrase, LactAce, Dairy Ease, and Lactrol. You should take the supplement right before you start eating. If you forget, you can take it during the meal, but it might not work as well. The important thing to know is that each product works a bit differently for each person. Plus, none of them can break down every last bit of lactose, so some people still have symptoms even with an enzyme supplement. Should I take calcium or vitamin D supplements?That depends on whether you completely avoid dairy foods. If you do, your doctor or nurse might recommend calcium supplements. He or she might also check your vitamin D levels to decide whether you should take supplements. Is lactose intolerance basically a food allergy?No. There are people who are allergic to milk and dairy foods. But the symptoms of a dairy allergy are often different from those of lactose intolerance. In the case of an allergy, the body reacts to the protein in milk, rather than to the sugar. Plus, allergies involve the body's infection-fighting system, called the immune system. Lactose intolerance does not.  5  gerd  discussed Rule of 2's; pt should avoid eating too much; too fast; too spicy; too lousy; less than two hours before bed  -Things to avoid including overeating, spicy foods, tight clothing, eating within three hours of bed, this list is not all inclusive.  -For treatment of reflux, possible options discussed including diet control, H2 blockers, PPIs, as well as coating motility agents discussed as treatment options. Timing of meals and proximity of last meal to sleep were discussed. If symptoms persist, a formal gastrointestinal evaluation is needed.

## 2024-05-13 NOTE — PHYSICAL EXAM
[Alert] : alert [Sclera] : the sclera and conjunctiva were normal [Normal Lips/Gums] : the lips and gums were normal [Normal Appearance] : the appearance of the neck was normal [Oropharynx] : the oropharynx was normal [Rate ___] : at [unfilled] breaths per minute [Normal Rhythm/Effort] : normal respiratory rhythm and effort [Clear Bilaterally] : the lungs were clear to auscultation bilaterally [Normal to Percussion] : the lungs were normal to percussion [5th Left ICS - MCL] : palpated at the 5th LICS in the midclavicular line [Normal Rate] : normal [Heart Rate ___] : [unfilled] bpm [Regularly Irregular] : regularly irregular [II] : a grade 2 [No Pitting Edema] : no pitting edema present [2+] : left 2+ [No Abnormalities] : the abdominal aorta was not enlarged and no bruit was heard [None] : no edema [Soft, Nontender] : the abdomen was soft and nontender [No Mass] : no masses were palpated [No HSM] : no hepatosplenomegaly noted [Cervical Lymph Nodes Enlarged Posterior Bilaterally] : no posterior cervical lymphadenopathy [Cervical Lymph Nodes Enlarged Anterior Bilaterally] : no anterior cervical lymphadenopathy [No CVA Tenderness] : no CVA  tenderness [Normal Station and Gait] : the gait and station were normal [Normal Motor Tone] : the muscle tone was normal [Involuntary Movements] : no involuntary movements were seen [Normal Color / Pigmentation] : normal skin color and pigmentation [Normal Turgor] : normal skin turgor [Motor Exam] : the motor exam was normal [Normal] : oriented to person, place, and time [Rt] : no varicose veins of the right leg [Lt] : no varicose veins of the left leg [Right Carotid Bruit] : no bruit heard over the right carotid [Left Carotid Bruit] : no bruit heard over the left carotid [Right Femoral Bruit] : no bruit heard over the right femoral artery [Left Femoral Bruit] : no bruit heard over the left femoral artery [Bruit] : no bruit heard

## 2024-05-13 NOTE — REASON FOR VISIT
[Follow-up] : a follow-up of an existing diagnosis [[employer] ] : provided by [employer]   [Time Spent: ____ minutes] : Total time spent using  services: [unfilled] minutes. The patient's primary language is not English thus required  services. [Interpreters_IDNumber] : [id number] [Interpreters_FullName] : Adriel  [TWNoteComboBox1] : [place]

## 2024-05-13 NOTE — HISTORY OF PRESENT ILLNESS
[de-identified] : 2016 [FreeTextEntry1] : 5/21 colon polyp diverticulosis nad hemorrhoids  [de-identified] : FibroScan/ Vibration Controlled Transient Elastography (VCTE) Report    YANNICK MORRELL  Apr 1 1955  [address] [facility] [address] [place], [place] [id number]    PROBE SIZE: XL  INDICATION: HBV  REFERRING PHYSICIAN: SHERWIN MCINTYRE MD      PROCEDURE:  Patient was NPO for 4 hours prior to procedure. After providing oral explanations of the procedure to the patient, patient was placed in supine position with right arm in maximum abduction to allow optimal exposure of right lateral abdomen. Patient abdomen was briefly assessed to identify to the terminus of the xyphoid process. The ideal target testing spot was located mid-line and lateral to this point. To acquire proper signals, the skin to liver capsule distance was accessed with the FibroScan probe. Patient was instructed to breathe normally and to abstain from sudden movements during the procedure. Ten shear waves were produced and measurements of the speed of each wave evaluated. Patient tolerated the procedure well and was discharged without incident.    FINDINGS:      Median shear wave speed of 1.19 meters/second.      This corresponds to a median Liver Stiffness Score of 4.3 kPa.      IQR 8 %       dB/m    RESULTS:    FIBROSIS:  F0-F1 - None to Mild    STEATOSIS:  S2 -S3 - steatosis      The recommendation regarding fibrosis stage and/ or steatosis grade is based on current published scientific literature and the provided patient's diagnosis. Any further medical or surgical intervention should be made by considering the overall medical condition of the patient.  Signatures      Electronically signed by : ANDREAS ROB NP; Dec 14 2021  3:38PM EST (Author) Electronically signed by : SHERWIN MCINTYRE MD; Dec 16 2021  8:16AM EST (Author)

## 2024-06-16 NOTE — ASSESSMENT
[FreeTextEntry1] : 68 year old male found to have stable Essential Hypertension, Hypercholesterolemia, Chronic Hepatitis B, Vitamin D Deficiency, NAFLD, Cardiac Arrhythmia, Obesity, BPH with Urinary Obstruction, with the current prescription regimen as recommended, diet and life style modifications, as counseled. Prior results reviewed, interpreted and discussed with the patient during today's examination, as appropriate. Follow up, treatment plan and tests, as ordered.  EKG: Sinus Rhythm @ 69 BPM. Known LAD, incomplete RBBB, LAHB, no new acute ST-T changes noted.  Total time spent : 30 minutes Including: Preparation prior to visit - Reviewing prior record, results of tests and Consultation Reports as applicable Conducting an appropriate H & P during today's encounter Appropriate orders for tests, medications and procedures, as applicable Counseling patient  Note completion

## 2024-06-16 NOTE — HISTORY OF PRESENT ILLNESS
[de-identified] : 68 year old  male patient with history of stable Essential Hypertension, Hypercholesterolemia, Chronic Hepatitis B, Vitamin D Deficiency, NAFLD, BPH with Urinary Obstruction, history as stated, presented for follow up examination. Patient is compliant with all medications. ROS as stated.

## 2024-06-16 NOTE — HEALTH RISK ASSESSMENT
[No] : In the past 12 months have you used drugs other than those required for medical reasons? No [No falls in past year] : Patient reported no falls in the past year [0] : 2) Feeling down, depressed, or hopeless: Not at all (0) [Former] : Former [de-identified] : Patient was recently evaluated by URO/GI, findings and recommendations reviewed with the patient during today's examination. [[id number]] : 0

## 2024-06-18 ENCOUNTER — APPOINTMENT (OUTPATIENT)
Dept: INTERNAL MEDICINE | Facility: CLINIC | Age: 69
End: 2024-06-18
Payer: MEDICARE

## 2024-06-18 ENCOUNTER — NON-APPOINTMENT (OUTPATIENT)
Age: 69
End: 2024-06-18

## 2024-06-18 VITALS
HEIGHT: 70 IN | DIASTOLIC BLOOD PRESSURE: 89 MMHG | TEMPERATURE: 98 F | RESPIRATION RATE: 18 BRPM | SYSTOLIC BLOOD PRESSURE: 149 MMHG | HEART RATE: 70 BPM | OXYGEN SATURATION: 95 % | BODY MASS INDEX: 31.92 KG/M2 | WEIGHT: 223 LBS

## 2024-06-18 DIAGNOSIS — N40.1 BENIGN PROSTATIC HYPERPLASIA WITH LOWER URINARY TRACT SYMPMS: ICD-10-CM

## 2024-06-18 DIAGNOSIS — E78.00 PURE HYPERCHOLESTEROLEMIA, UNSPECIFIED: ICD-10-CM

## 2024-06-18 DIAGNOSIS — B18.1 CHRONIC VIRAL HEPATITIS B W/OUT DELTA-AGENT: ICD-10-CM

## 2024-06-18 DIAGNOSIS — N13.8 BENIGN PROSTATIC HYPERPLASIA WITH LOWER URINARY TRACT SYMPMS: ICD-10-CM

## 2024-06-18 DIAGNOSIS — K76.0 FATTY (CHANGE OF) LIVER, NOT ELSEWHERE CLASSIFIED: ICD-10-CM

## 2024-06-18 DIAGNOSIS — I10 ESSENTIAL (PRIMARY) HYPERTENSION: ICD-10-CM

## 2024-06-18 PROCEDURE — 99214 OFFICE O/P EST MOD 30 MIN: CPT | Mod: 25

## 2024-06-18 NOTE — ASSESSMENT
[FreeTextEntry1] : 69 year old male found to have stable Essential Hypertension, Hypercholesterolemia, Chronic Hepatitis B, Vitamin D Deficiency, NAFLD, Cardiac Arrhythmia, Obesity, BPH with Urinary Obstruction, with the current prescription regimen as recommended, diet and life style modifications, as counseled. Prior results reviewed, interpreted and discussed with the patient during today's examination, as appropriate. Follow up, treatment plan and tests, as ordered.  Total time spent : 30 minutes Including: Preparation prior to visit - Reviewing prior record, results of tests and Consultation Reports as applicable Conducting an appropriate H & P during today's encounter Appropriate orders for tests, medications and procedures, as applicable Counseling patient  Note completion

## 2024-06-18 NOTE — HEALTH RISK ASSESSMENT
[No] : In the past 12 months have you used drugs other than those required for medical reasons? No [No falls in past year] : Patient reported no falls in the past year [0] : 2) Feeling down, depressed, or hopeless: Not at all (0) [Former] : Former [de-identified] : GI/PULM [[id number]] : 0

## 2024-06-18 NOTE — HISTORY OF PRESENT ILLNESS
[de-identified] : 69 year old  male patient with history of stable Essential Hypertension, Hypercholesterolemia, Chronic Hepatitis B, Vitamin D Deficiency, NAFLD, BPH with Urinary Obstruction, history as stated, presented for follow up examination. Patient is compliant with all medications. ROS as stated.

## 2024-06-18 NOTE — REVIEW OF SYSTEMS
[facility]  [address] [place] [address]  Phone: [phone]  Fax: [phone]    Visit Date: 1/6/2023    Subjective:   Nishant Link is a 61 y.o. female referred by Dr. Sera Gibson for a pancreatic mass. Patient was seen by her PCP at the beginning of December with complaint's of LUQ abdominal pain that had been intermittent for some time but had become more constant. Patient stated the pain started in her LUQ and radiated to her back. Patient also endorsed reflux, Nausea and a bout of emesis. Pain is described as a constant dull ache. Pain increases with movement. Describes stool as pale in color. Imaging studies performed revealed evidence of liver metastases, lymph node metastases, lung metastases, left adrenal gland and spleen metastases. Biopsies of a retroperitoneal lymph node and liver lesion showed no evidence of malignancy. Patient is followed by Dr. Sera Gibson.     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    Depression     Diabetes mellitus ([address]     Hx of radiation therapy     melanoma right eye    Hypertension     Insulin pump in place     Prolonged emergence from general anesthesia     slow to wake up     Past Surgical History:   Procedure Laterality Date    CARPAL TUNNEL RELEASE Bilateral 12/2017    CHOLECYSTECTOMY      CT BIOPSY ABDOMEN RETROPERITONEUM  12/27/2022    CT BIOPSY ABDOMEN RETROPERITONEUM 12/27/2022 [facility] CT SCAN    EYE SURGERY Right     biopsy    HYSTERECTOMY (CERVIX STATUS UNKNOWN)      LIPOMA RESECTION      LIVER BIOPSY Right     SHOULDER ARTHROSCOPY Right 08/31/2018    RIGHT SHOULDER ARTHROSCOPE, SUBACROMIAL DECOMPRESSION, DISTALCLAVICLE EXCISION, CAPSULAR RELEASE, MANIPULATION UNDER ANESTHESIA, DEBRIDEMENT    SHOULDER SURGERY      UPPER GASTROINTESTINAL ENDOSCOPY  10/22/2014    US GUIDED LIVER BIOPSY PERCUTANEOUS  12/16/2022    US GUIDED LIVER BIOPSY PERCUTANEOUS 12/16/2022 [facility] ULTRASOUND Social History     Tobacco Use    Smoking status: Former     Packs/day: 1.00     Years: 10.00     Pack years: 10.00     Types: Cigarettes     Quit date: 1980     Years since quittin.1    Smokeless tobacco: Never   Vaping Use    Vaping Use: Never used   Substance Use Topics    Alcohol use: No    Drug use: No     Family History   Problem Relation Age of Onset    High Blood Pressure Mother     Breast Cancer Mother         breast w mets to bone    Diabetes Father     Stroke Father     Cancer Father         bladder       Allergies: Patient has no known allergies.   Current Outpatient Medications   Medication Sig Dispense Refill    gabapentin (NEURONTIN) 300 MG capsule Gabapentin Oral        active      lisinopril (PRINIVIL;ZESTRIL) 5 MG tablet 5 mg      omeprazole (PRILOSEC) 20 MG delayed release capsule 20 mg      HUMALOG 100 UNIT/ML SOLN injection vial       insulin lispro (HUMALOG) 100 UNIT/ML injection vial Insulin Lispro Subcutaneous        active      insulin lispro (HUMALOG) 100 UNIT/ML injection vial Insulin Lispro Subcutaneous        active      gabapentin (NEURONTIN) 300 MG capsule Gabapentin Oral        active      gabapentin (NEURONTIN) 300 MG capsule TAKE TWO CAPSULES BY MOUTH AT BEDTIME 180 capsule 0    busPIRone (BUSPAR) 5 MG tablet Take 1-2 tablets by mouth 3 times daily as needed (anxiety) 90 tablet 0    ondansetron (ZOFRAN-ODT) 4 MG disintegrating tablet Take 1 tablet by mouth 3 times daily as needed for Nausea or Vomiting 21 tablet 0    rOPINIRole (REQUIP) 1 MG tablet Take 1 tablet by mouth nightly 90 tablet 0    Continuous Blood Gluc Sensor (DEXCOM G6 SENSOR) MISC USE AS DIRECTED AND CHANGE EVERY 10 DAYS 3 each 2    insulin lispro (HUMALOG) 100 UNIT/ML injection vial Total daily dose 30 units, use in insulin pump 40 mL 1    Insulin Disposable Pump (OMNIPOD 5 G6 INTRO, GEN 5,) KIT Use as directed by MD 1 kit 0    Insulin Disposable Pump (OMNIPOD 5 G6 POD, GEN 5,) MISC Change pods q 2-3 days 30 each 1    Continuous Blood Gluc Transmit (DEXCOM G6 TRANSMITTER) MISC Change transmitter every 3 months 1 each 1    Insulin Syringe-Needle U-100 ([employer] INSULIN SYRINGE) 31G X 5/16\" 0.5 ML MISC 100 each by Does not apply route. 100 each 6     No current facility-administered medications for this visit. Review of Systems: See HPI. All other systems reviewed and are negative. Objective:     Vitals:    01/06/23 1501   BP: (!) 201/77   Site: Left Upper Arm   Pulse: 94   Temp: 97.2 °F (36.2 °C)   TempSrc: Temporal   Weight: 108 lb (49 kg)   Height: 5' (1.524 m)       Physical Exam:   General:  Alert, oriented x 3, cooperative, no distress, appears stated age. Skin: Skin color, texture, turgor normal.    Lymph nodes: Cervical, supraclavicular, and axillary nodes normal.   HENT:  Normocephalic, without obvious abnormality. Moist mucus membranes. No icterus. Lungs: No respiratory distress. Heart:  Regular rate and rhythm. No murmur. Abdomen: Soft, non-tender. No masses,  No organomegaly. Extremities: Extremities normal, atraumatic, no cyanosis or edema. Neurologic: Grossly intact. Psychiatric: Appropriate mood and thought process         Labs:  No results found for: PSA, CEA, , BF7616,   Lab Results   Component Value Date    WBC 9.8 12/05/2022    HGB 8.3 (L) 12/05/2022    HCT 26.3 (L) 12/05/2022    MCV 76.4 (L) 12/05/2022     12/16/2022     Lab Results   Component Value Date     12/05/2022    K 4.4 12/05/2022     12/05/2022    CO2 21 12/05/2022    BUN 22 (H) 12/05/2022    CREATININE 1.2 12/05/2022    GLUCOSE 181 (H) 12/05/2022    CALCIUM 9.2 12/05/2022    PROT 7.6 09/19/2022    LABALBU 4.5 09/19/2022    BILITOT 0.3 09/19/2022    ALKPHOS 173 (H) 09/19/2022    AST 20 09/19/2022    ALT 14 09/19/2022    LABGLOM 50 (A) 12/05/2022    GFRAA 39 (A) 09/19/2022    AGRATIO 1.5 09/19/2022       CT ABD/Pelvis 12/6/22:   1.  Mass involving the tail the pancreas and splenic hilus with retroperitoneal lymphadenopathy and splenic vein tumor thrombus, concordant with pancreatic neoplasm  2. Metastatic disease of the lung, liver, left adrenal gland and spleen    Pathology 12/27/22 FINAL DIAGNOSIS:   Retroperitoneal lymph node:   - Benign adipose tissue with scattered inflammatory cells. - Lymph node tissue is not identified. Pathology 12/16/22 FINAL DIAGNOSIS:   Core biopsy, right liver lesion:   Fragments of hepatic parenchyma with focal steatosis and focal features   of steatohepatitis. No evidence of malignancy. Assessment/Plan:      Diagnosis Orders   1. Liver mass  CBC with Auto Differential    Comprehensive Metabolic Panel    CANCER ANTIGEN 19-9    CEA    CHROMOGRANIN A      2. Pancreatic mass  AFL - Love Ortiz MD, Gastroenterology (ERCP & EUS), [facility]    CBC with Auto Differential    Comprehensive Metabolic Panel    CANCER ANTIGEN 19-9    CEA    CHROMOGRANIN A      3. Splenic mass  CBC with Auto Differential    Comprehensive Metabolic Panel    CANCER ANTIGEN 19-9    CEA    CHROMOGRANIN A      4. Retroperitoneal lymphadenopathy  CBC with Auto Differential    Comprehensive Metabolic Panel    CANCER ANTIGEN 19-9    CEA    CHROMOGRANIN A        Patient being followed by Dr. Michael Akers at [facility]. Outside records, labs (CBC, CMP), path from liver, path from retroperitoneum, and imaging reports reviewed. Appears to have a mass at pancreatic tail and spleen with splenic vein tumor thrombus and retroperitoneal lymphadenopathy, likely pancreatic cancer. CT is suggestive of metastasis to lung, liver, and left adrenal gland; however liver biopsy did not confirm malignancy and attempted retroperitoneal lymph node resulted in sample of benign adipose tissue with lymph node not identified. Discussed about liver mass excision for tissue diagnosis.   I had a discussion with the patient and family regarding the risks, benefits, and alternatives of the procedure, including, but not limited to: bleeding, infection, pancreatic leak, poor wound healing or cosmetic result, hernia, fistula, need for reoperation or additional procedures, damage to other structures, such as bowel, stomach, liver, and neurovascular structures. Need for, and risks of general anesthesia discussed. Postoperative typical recovery was discussed. The likelihood of open operation was discussed as well. Prep, preoperative testing, typical hospital stay, and perioperative expectations were addressed, as well as typical expectations regarding pain control and time away from work and daily activities. All questions were answered to patient's satisfaction. They understand that even in technically successfully operations and even in healthy patients, complications can occur, including possibility of death. Discussed need for optimization of nutrition and physical activity in preparation for surgery. Recommended she increase protein intake, and offered referral for prehab. Patient understands higher risk of perioperative morbidity and mortality given: diabetes     Printed diagrams with written information provided to patient. I reviewed imaging personally - liver lesions are not very suspicious for me. Thus I would prefer pt to get EUS attempt at biopsy of pancreatic mass first. Discussed with Dr. Vee Bloomington and he agrees with it. Tumor markers ordered. All the questions of the patient are answered to her apparent satisfaction. Patient had multiple relevant questions and answered all of them. Patient verbalized understanding of the management plan. Patient is encouraged to call the office with any questions or concerns.      Lavon Sparrow MD  Surgery Attending [TextEntry] : CARDIOVASCULAR: Negative RESPIRATORY: Negative GASTROINTESTINAL: Negative NEUROLOGICAL: Negative

## 2024-06-20 LAB
25(OH)D3 SERPL-MCNC: 63.2 NG/ML
ALBUMIN SERPL ELPH-MCNC: 4.5 G/DL
ALP BLD-CCNC: 72 U/L
ALT SERPL-CCNC: 24 U/L
ANION GAP SERPL CALC-SCNC: 14 MMOL/L
APPEARANCE: CLEAR
AST SERPL-CCNC: 28 U/L
BACTERIA: NEGATIVE /HPF
BASOPHILS # BLD AUTO: 0.06 K/UL
BASOPHILS NFR BLD AUTO: 0.7 %
BILIRUB SERPL-MCNC: 1.3 MG/DL
BILIRUBIN URINE: NEGATIVE
BLOOD URINE: NEGATIVE
BUN SERPL-MCNC: 11 MG/DL
CALCIUM SERPL-MCNC: 9.8 MG/DL
CAST: 0 /LPF
CHLORIDE SERPL-SCNC: 105 MMOL/L
CHOLEST SERPL-MCNC: 136 MG/DL
CO2 SERPL-SCNC: 22 MMOL/L
COLOR: YELLOW
CREAT SERPL-MCNC: 0.95 MG/DL
CREAT SPEC-SCNC: 97 MG/DL
EGFR: 88 ML/MIN/1.73M2
EOSINOPHIL # BLD AUTO: 0.38 K/UL
EOSINOPHIL NFR BLD AUTO: 4.7 %
EPITHELIAL CELLS: 0 /HPF
ESTIMATED AVERAGE GLUCOSE: 111 MG/DL
GGT SERPL-CCNC: 28 U/L
GLUCOSE QUALITATIVE U: NEGATIVE MG/DL
GLUCOSE SERPL-MCNC: 95 MG/DL
HBA1C MFR BLD HPLC: 5.5 %
HCT VFR BLD CALC: 48.4 %
HDLC SERPL-MCNC: 46 MG/DL
HGB BLD-MCNC: 15.6 G/DL
IMM GRANULOCYTES NFR BLD AUTO: 0.4 %
KETONES URINE: NEGATIVE MG/DL
LDLC SERPL CALC-MCNC: 75 MG/DL
LEUKOCYTE ESTERASE URINE: NEGATIVE
LYMPHOCYTES # BLD AUTO: 2.33 K/UL
LYMPHOCYTES NFR BLD AUTO: 29.1 %
MAN DIFF?: NORMAL
MCHC RBC-ENTMCNC: 31.3 PG
MCHC RBC-ENTMCNC: 32.2 GM/DL
MCV RBC AUTO: 97 FL
MICROALBUMIN 24H UR DL<=1MG/L-MCNC: <1.2 MG/DL
MICROALBUMIN/CREAT 24H UR-RTO: NORMAL MG/G
MICROSCOPIC-UA: NORMAL
MONOCYTES # BLD AUTO: 0.8 K/UL
MONOCYTES NFR BLD AUTO: 10 %
NEUTROPHILS # BLD AUTO: 4.42 K/UL
NEUTROPHILS NFR BLD AUTO: 55.1 %
NITRITE URINE: NEGATIVE
NONHDLC SERPL-MCNC: 90 MG/DL
PH URINE: 6.5
PLATELET # BLD AUTO: 240 K/UL
POTASSIUM SERPL-SCNC: 4.9 MMOL/L
PROT SERPL-MCNC: 7.4 G/DL
PROTEIN URINE: NEGATIVE MG/DL
PSA FREE FLD-MCNC: 35 %
PSA FREE SERPL-MCNC: 0.28 NG/ML
PSA SERPL-MCNC: 0.81 NG/ML
RBC # BLD: 4.99 M/UL
RBC # FLD: 13.2 %
RED BLOOD CELLS URINE: 1 /HPF
SODIUM SERPL-SCNC: 141 MMOL/L
SPECIFIC GRAVITY URINE: 1.01
TRIGL SERPL-MCNC: 75 MG/DL
TSH SERPL-ACNC: 1.8 UIU/ML
UROBILINOGEN URINE: 1 MG/DL
WBC # FLD AUTO: 8.02 K/UL
WHITE BLOOD CELLS URINE: 0 /HPF

## 2024-06-21 ENCOUNTER — APPOINTMENT (OUTPATIENT)
Dept: CARDIOLOGY | Facility: CLINIC | Age: 69
End: 2024-06-21
Payer: MEDICARE

## 2024-06-21 ENCOUNTER — NON-APPOINTMENT (OUTPATIENT)
Age: 69
End: 2024-06-21

## 2024-06-21 VITALS
TEMPERATURE: 98.1 F | BODY MASS INDEX: 32 KG/M2 | WEIGHT: 223 LBS | DIASTOLIC BLOOD PRESSURE: 79 MMHG | SYSTOLIC BLOOD PRESSURE: 136 MMHG | HEART RATE: 70 BPM | OXYGEN SATURATION: 95 %

## 2024-06-21 DIAGNOSIS — I49.9 CARDIAC ARRHYTHMIA, UNSPECIFIED: ICD-10-CM

## 2024-06-21 PROCEDURE — 99204 OFFICE O/P NEW MOD 45 MIN: CPT | Mod: 25

## 2024-06-21 PROCEDURE — 93000 ELECTROCARDIOGRAM COMPLETE: CPT

## 2024-06-22 LAB — HEMOCCULT STL QL IA: POSITIVE

## 2024-06-24 PROBLEM — I49.9 CARDIAC ARRHYTHMIA: Status: ACTIVE | Noted: 2021-03-23

## 2024-06-24 NOTE — ASSESSMENT
[FreeTextEntry1] : Cardiology Studies ECG 6/21/24 NSR RSR' V1  ------------------------------------------------------------------------ 69M former smoker, w hepB,  HLD, HTN, referred by PCP for arrhythmia noted on exam not captured on ECG   #?Arrythmia - Pt denies sx. ECG NSR.  - ECG done  - TTE to r/o structural abn  - Holter 48H  #HTN - acceptable  - cont amlodipine   HLD -controlled  - cont statin   RTC 1 mo

## 2024-06-24 NOTE — REASON FOR VISIT
[Symptom and Test Evaluation] : symptom and test evaluation [Time Spent: ____ minutes] : Total time spent using  services: [unfilled] minutes. The patient's primary language is not English thus required  services. [Interpreters_IDNumber] : [id number] [TWNoteComboBox1] : Norwegian

## 2024-06-24 NOTE — HISTORY OF PRESENT ILLNESS
[FreeTextEntry1] : 69M former smoker, w hepB,  HLD, HTN, referred by PCP, pt does not know why. Per referral pt had "PVCs" on exam but not captured by ECG. Pt reports he never felt any palpitations. Pt is active, able to climb 2flights of stairs without issues.  Patient denies chest pain, palpitations, syncope, shortness of breath, LE edema, PND/orthopnea. No nausea/vomiting, abdominal discomfort, bleeding/bruising, headaches.  Family History: Mother - passed in 80s, CVA  Father - passed away from heart attack age 88 Siblings - brother passed from liver cancer, brother passed car accident, ret of brothers no issues   No SCD in relatives <50 years of age  Social History: Tobacco - 25yrs ago, 15 yrs 2ppd  EtOH - 3-4beers/week  Illicits - denies

## 2024-07-15 ENCOUNTER — RESULT REVIEW (OUTPATIENT)
Age: 69
End: 2024-07-15

## 2024-07-15 ENCOUNTER — OUTPATIENT (OUTPATIENT)
Dept: OUTPATIENT SERVICES | Facility: HOSPITAL | Age: 69
LOS: 1 days | End: 2024-07-15
Payer: MEDICARE

## 2024-07-15 DIAGNOSIS — I49.9 CARDIAC ARRHYTHMIA, UNSPECIFIED: ICD-10-CM

## 2024-07-15 PROCEDURE — 93306 TTE W/DOPPLER COMPLETE: CPT | Mod: 26

## 2024-07-15 PROCEDURE — 93306 TTE W/DOPPLER COMPLETE: CPT

## 2024-07-19 ENCOUNTER — APPOINTMENT (OUTPATIENT)
Dept: CARDIOLOGY | Facility: CLINIC | Age: 69
End: 2024-07-19
Payer: MEDICARE

## 2024-07-19 VITALS
DIASTOLIC BLOOD PRESSURE: 74 MMHG | SYSTOLIC BLOOD PRESSURE: 158 MMHG | BODY MASS INDEX: 32 KG/M2 | OXYGEN SATURATION: 97 % | WEIGHT: 223 LBS | TEMPERATURE: 97.6 F | HEART RATE: 73 BPM

## 2024-07-19 VITALS — DIASTOLIC BLOOD PRESSURE: 94 MMHG | SYSTOLIC BLOOD PRESSURE: 154 MMHG

## 2024-07-19 DIAGNOSIS — I49.9 CARDIAC ARRHYTHMIA, UNSPECIFIED: ICD-10-CM

## 2024-07-19 PROCEDURE — 93224 XTRNL ECG REC UP TO 48 HRS: CPT

## 2024-08-05 ENCOUNTER — NON-APPOINTMENT (OUTPATIENT)
Age: 69
End: 2024-08-05

## 2024-08-06 ENCOUNTER — APPOINTMENT (OUTPATIENT)
Dept: UROLOGY | Facility: CLINIC | Age: 69
End: 2024-08-06

## 2024-08-06 PROCEDURE — 99214 OFFICE O/P EST MOD 30 MIN: CPT

## 2024-08-06 PROCEDURE — G2211 COMPLEX E/M VISIT ADD ON: CPT

## 2024-08-06 NOTE — ASSESSMENT
[FreeTextEntry1] : Kamala jacobs 69-year-old gentleman who presents for follow-up of BPH with urinary obstruction, screening for prostate cancer -A1c 5.5% -PSA 0.81 -UA 1 RBC/hpf -We discussed that his risk for prostate cancer is low -Continue tamsulosin-refill sent to the pharmacy -Follow-up in 6 months or sooner if necessary  Patient is being seen today for evaluation and management of a chronic and longitudinal ongoing condition and I am of the primary treating physician

## 2024-08-06 NOTE — HISTORY OF PRESENT ILLNESS
[FreeTextEntry1] : Very pleasant 69-year-old gentleman who presents for follow-up of BPH with urinary traction, screening for prostate cancer.  He feels well.  He reports that tamsulosin continues to significantly improve his urinary symptoms.  Nocturia x 2.  He is happy with his symptoms on the medication.  He was to continue to take this.

## 2024-08-09 ENCOUNTER — APPOINTMENT (OUTPATIENT)
Dept: CARDIOLOGY | Facility: CLINIC | Age: 69
End: 2024-08-09

## 2024-08-09 PROCEDURE — 99214 OFFICE O/P EST MOD 30 MIN: CPT

## 2024-08-10 NOTE — ASSESSMENT
[FreeTextEntry1] : Cardiology Studies ECG 6/21/24 NSR RSR' V1  TTE 7/15/24 "1. Left ventricular cavity is normal in size. Left ventricular wall thickness is normal. Left ventricular systolic function is normal with an ejection fraction of 60 % by Breen's method of disks.  2. The left ventricular diastolic function is indeterminate.  3. Normal right ventricular cavity size and normal right ventricular systolic function.  4. Trileaflet aortic valve. No aortic valve stenosis.  5. Trace aortic regurgitation.  6. Structurally normal mitral valve with normal leaflet excursion.  7. No mitral regurgitation.  8. No pericardial effusion seen.  9. No prior echocardiogram is available for comparison."  Holter 7/20/24/ - 7/22/24 ~11% PVCs  ------------------------------------------------------------------------ 69M former smoker, w hepB,  HLD, HTN, referred by PCP for arrhythmia, with Holter showing ~11%PVC burden  #PVCs- ~11% PVC burden on Holter/ TTE shows structurally normal heart.  - f/u cMRI  - f/u w EP  - cont Toprol 12.5mg po qday, uptitrate as tolerated  #HTN - acceptable  - cont amlodipine  - now on Toprol 12.5mg po qday   HLD -controlled  - cont statin   RTC 3 mo or sooner as clinically indicated

## 2024-08-10 NOTE — REASON FOR VISIT
[Arrhythmia/ECG Abnorrmalities] : arrhythmia/ECG abnormalities [[employer] ] : provided by [employer]   [Interpreters_IDNumber] : [id number] [Interpreters_FullName] : cherie [TWNoteComboBox1] : Russian

## 2024-08-10 NOTE — HISTORY OF PRESENT ILLNESS
[FreeTextEntry1] : 69M former smoker, w hepB,  HLD presenting for f/u. Holter showed ~10% PVC burden.  Pt reports feeling well. Has no complaints. Has cMRI scheduled and has appt w EP coming up. Has started Toprol, no issues.   Clinic visit 7/21/24 - Holter and TTE ordered. Started on Toprol.   Family History: Mother - passed in 80s, CVA  Father - passed away from heart attack age 88 Siblings - brother passed from liver cancer, brother passed car accident, ret of brothers no issues   No SCD in relatives <50 years of age  Social History: Tobacco - 25yrs ago, 15 yrs 2ppd  EtOH - 3-4beers/week  Illicits - denies

## 2024-08-10 NOTE — REASON FOR VISIT
[Arrhythmia/ECG Abnorrmalities] : arrhythmia/ECG abnormalities [[employer] ] : provided by [employer]   [Interpreters_IDNumber] : [id number] [Interpreters_FullName] : cherie [TWNoteComboBox1] : Australian

## 2024-08-20 NOTE — PHYSICAL EXAM
Statement Selected [No Acute Distress] : no acute distress [Normal Oropharynx] : normal oropharynx [II] : Mallampati Class: II [Normal Appearance] : normal appearance [Normal Rate/Rhythm] : normal rate/rhythm [Normal S1, S2] : normal s1, s2 [No Resp Distress] : no resp distress [Clear to Auscultation Bilaterally] : clear to auscultation bilaterally [No Abnormalities] : no abnormalities [Normal Gait] : normal gait [No Clubbing] : no clubbing [No Edema] : no edema [Normal Color/ Pigmentation] : normal color/ pigmentation [No Focal Deficits] : no focal deficits [Oriented x3] : oriented x3 [Normal Affect] : normal affect

## 2024-08-21 ENCOUNTER — APPOINTMENT (OUTPATIENT)
Dept: PULMONOLOGY | Facility: CLINIC | Age: 69
End: 2024-08-21
Payer: MEDICARE

## 2024-08-21 ENCOUNTER — NON-APPOINTMENT (OUTPATIENT)
Age: 69
End: 2024-08-21

## 2024-08-21 ENCOUNTER — APPOINTMENT (OUTPATIENT)
Dept: ELECTROPHYSIOLOGY | Facility: CLINIC | Age: 69
End: 2024-08-21
Payer: MEDICARE

## 2024-08-21 VITALS
WEIGHT: 224 LBS | DIASTOLIC BLOOD PRESSURE: 66 MMHG | OXYGEN SATURATION: 96 % | SYSTOLIC BLOOD PRESSURE: 127 MMHG | HEART RATE: 70 BPM | TEMPERATURE: 98.2 F | BODY MASS INDEX: 32.07 KG/M2 | HEIGHT: 70 IN | RESPIRATION RATE: 15 BRPM

## 2024-08-21 VITALS
TEMPERATURE: 98.3 F | RESPIRATION RATE: 15 BRPM | OXYGEN SATURATION: 95 % | BODY MASS INDEX: 32.21 KG/M2 | HEART RATE: 61 BPM | HEIGHT: 70 IN | DIASTOLIC BLOOD PRESSURE: 72 MMHG | SYSTOLIC BLOOD PRESSURE: 130 MMHG | WEIGHT: 225 LBS

## 2024-08-21 DIAGNOSIS — Z87.891 PERSONAL HISTORY OF NICOTINE DEPENDENCE: ICD-10-CM

## 2024-08-21 DIAGNOSIS — R91.8 OTHER NONSPECIFIC ABNORMAL FINDING OF LUNG FIELD: ICD-10-CM

## 2024-08-21 DIAGNOSIS — J43.9 EMPHYSEMA, UNSPECIFIED: ICD-10-CM

## 2024-08-21 DIAGNOSIS — I49.3 VENTRICULAR PREMATURE DEPOLARIZATION: ICD-10-CM

## 2024-08-21 PROCEDURE — 99214 OFFICE O/P EST MOD 30 MIN: CPT | Mod: 25

## 2024-08-21 PROCEDURE — G2211 COMPLEX E/M VISIT ADD ON: CPT

## 2024-08-21 PROCEDURE — 99214 OFFICE O/P EST MOD 30 MIN: CPT

## 2024-08-21 PROCEDURE — 93000 ELECTROCARDIOGRAM COMPLETE: CPT

## 2024-08-21 RX ORDER — UMECLIDINIUM BROMIDE AND VILANTEROL TRIFENATATE 62.5; 25 UG/1; UG/1
62.5-25 POWDER RESPIRATORY (INHALATION) DAILY
Qty: 1 | Refills: 5 | Status: ACTIVE | COMMUNITY
Start: 2024-08-21 | End: 1900-01-01

## 2024-08-21 NOTE — CARDIOLOGY SUMMARY
[de-identified] : 8/21/2024: Sinus rhythm at 60 bpm, rsR' [de-identified] : 7/15/2024:  1. Left ventricular cavity is normal in size. Left ventricular wall thickness is normal. Left ventricular systolic function is normal with an ejection fraction of 60 % by Breen's method of disks.  2. The left ventricular diastolic function is indeterminate.  3. Normal right ventricular cavity size and normal right ventricular systolic function.  4. Trileaflet aortic valve. No aortic valve stenosis.  5. Trace aortic regurgitation.  6. Structurally normal mitral valve with normal leaflet excursion.  7. No mitral regurgitation.  8. No pericardial effusion seen.  9. No prior echocardiogram is available for comparison.

## 2024-08-21 NOTE — ASSESSMENT
[FreeTextEntry1] : ~age~yo man and former smoker w/ PMH HTN, NAFLD, emphysema, chronic HBV referred by PMD for mgmt of emphysema and lung CA screening. Has pulmonary nodules being surveilled, and with spirometric and radiographic evidence of COPD.   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

## 2024-08-21 NOTE — REASON FOR VISIT
[Follow-Up] : a follow-up visit [Emphysema] : emphysema [[employer] ] : provided by [employer]   [Interpreters_IDNumber] : [id number] [Interpreters_FullName] : Jarrod [TWNoteComboBox1] : Barbadian [TextBox_13] : Dr. Rashel Montes

## 2024-08-21 NOTE — HISTORY OF PRESENT ILLNESS
[Former] : former [Never] : never [Lung Cancer Screening] : Patient underwent lung cancer screening [1] : 1 [2] : 2 [TextBox_4] : ~age~yo man and former smoker w/ PMH HTN, NAFLD, emphysema, chronic HBV referred by PMD for mgmt of emphysema and lung CA screening.   Was previously enrolled in [facility] LDCT program. Lost to follow-up. Last CT 7/2021 (LUNG RADS 1). Feels well today. Denies any specific respiratory complaints. Said he was recommended to establish care by his primary care doctor. Denies dyspnea at rest with exertion. Able to perform ADLs and exertional activity without limitation. Sometimes has cough first thing in the morning -- can be dry or productive. Does not cough throughout the day usually. Describes sleep as adequate and without issue.   SH: Works as  Quit smoking around 2009/2010, previously smoked 1-1.5 packs daily for most of his adult life  [8/21/24] Here for follow-up. Feels well. Denies respiratory complaints. Doesn't really cough much or bring up sputa, though clear if he does. Trying to stay active.  [TextBox_11] : 1.5 [TextBox_13] : 30 [YearQuit] : 2010 [TextBox_12] : 06/16 [TextBox_27] : 06/17 [TextBox_42] : 04/24 [TextBox_52] : 7

## 2024-08-21 NOTE — DISCUSSION/SUMMARY
[FreeTextEntry1] : In summary, this is a 69-year-old man with Hep B, former smoker, HLD and frequent PVCs. His PVC burden is 10%. He has no symptoms related to the PVCs and his LV function is normal. I discussed with him today that treatment of PVCs is centered around symptom management and improvement of LV function. Given that he has no LV dysfunction, and that he is asymptomatic, we will continue to monitor him at this time. He is pending a cardiac MR. He will need yearly echos to evaluate his LV function. If his LV function decreases, or if he develops symptoms, then he would benefit from an ablation at that time.  Mr. Man appeared to understand the whole discussion and verbalized that all of his questions were answered to his satisfaction.  Thank you for allowing me to be involved in the care of this pleasant man. Please feel free to contact me with any questions. [EKG obtained to assist in diagnosis and management of assessed problem(s)] : EKG obtained to assist in diagnosis and management of assessed problem(s)

## 2024-08-21 NOTE — HISTORY OF PRESENT ILLNESS
[FreeTextEntry1] : Referring Physician: Belkys Padron MD   Dear Dr. Padron:   Mr. Man was seen in the [facility] today. For our records, please allow me to summarize the history and my findings.   This pleasant 69-year-old man has a history significant for PVCs, Hep B, former smoker, and HLD. He was noted to have 10% PVC burden on a Holter monitor. He reports no symptoms related to his PVCs. He had an echo which showed normal LV function. He is pending a cardiac MRI. He is on a low dose metoprolol which he is tolerating well.   Mr. Man denies any recent history of chest pain, shortness of breath, palpitations, dizziness, or syncope.

## 2024-08-21 NOTE — REASON FOR VISIT
[Follow-Up] : a follow-up visit [Emphysema] : emphysema [[employer] ] : provided by [employer]   [Interpreters_IDNumber] : [id number] [Interpreters_FullName] : Jarrod [TWNoteComboBox1] : Slovenian [TextBox_13] : Dr. Rashel Montes

## 2024-08-21 NOTE — HISTORY OF PRESENT ILLNESS
[Former] : former [Never] : never [Lung Cancer Screening] : Patient underwent lung cancer screening [1] : 1 [2] : 2 [TextBox_4] : ~age~yo man and former smoker w/ PMH HTN, NAFLD, emphysema, chronic HBV referred by PMD for mgmt of emphysema and lung CA screening.   Was previously enrolled in [facility] LDCT program. Lost to follow-up. Last CT 7/2021 (LUNG RADS 1). Feels well today. Denies any specific respiratory complaints. Said he was recommended to establish care by his primary care doctor. Denies dyspnea at rest with exertion. Able to perform ADLs and exertional activity without limitation. Sometimes has cough first thing in the morning -- can be dry or productive. Does not cough throughout the day usually. Describes sleep as adequate and without issue.   SH: Works as  Quit smoking around 2009/2010, previously smoked 1-1.5 packs daily for most of his adult life  [8/21/24] Here for follow-up. Feels well. Denies respiratory complaints. Doesn't really cough much or bring up sputa, though clear if he does. Trying to stay active.  [TextBox_11] : 1.5 [TextBox_13] : 30 [YearQuit] : 2010 [TextBox_12] : 06/16 [TextBox_27] : 06/17 [TextBox_42] : 04/24 [TextBox_52] : 7

## 2024-09-17 ENCOUNTER — RESULT REVIEW (OUTPATIENT)
Age: 69
End: 2024-09-17

## 2024-09-17 ENCOUNTER — OUTPATIENT (OUTPATIENT)
Dept: OUTPATIENT SERVICES | Facility: HOSPITAL | Age: 69
LOS: 1 days | End: 2024-09-17
Payer: COMMERCIAL

## 2024-09-17 ENCOUNTER — APPOINTMENT (OUTPATIENT)
Dept: MRI IMAGING | Facility: CLINIC | Age: 69
End: 2024-09-17
Payer: MEDICARE

## 2024-09-17 DIAGNOSIS — I49.9 CARDIAC ARRHYTHMIA, UNSPECIFIED: ICD-10-CM

## 2024-09-17 PROCEDURE — A9585: CPT

## 2024-09-17 PROCEDURE — 75561 CARDIAC MRI FOR MORPH W/DYE: CPT

## 2024-09-17 PROCEDURE — 75561 CARDIAC MRI FOR MORPH W/DYE: CPT | Mod: 26

## 2024-09-17 PROCEDURE — 75565 CARD MRI VELOC FLOW MAPPING: CPT

## 2024-09-17 PROCEDURE — 75565 CARD MRI VELOC FLOW MAPPING: CPT | Mod: 26

## 2024-09-26 ENCOUNTER — APPOINTMENT (OUTPATIENT)
Dept: INTERNAL MEDICINE | Facility: CLINIC | Age: 69
End: 2024-09-26
Payer: MEDICARE

## 2024-09-26 VITALS
BODY MASS INDEX: 31.5 KG/M2 | SYSTOLIC BLOOD PRESSURE: 135 MMHG | HEART RATE: 71 BPM | HEIGHT: 70 IN | TEMPERATURE: 98.3 F | RESPIRATION RATE: 16 BRPM | DIASTOLIC BLOOD PRESSURE: 79 MMHG | OXYGEN SATURATION: 94 % | WEIGHT: 220 LBS

## 2024-09-26 DIAGNOSIS — E78.00 PURE HYPERCHOLESTEROLEMIA, UNSPECIFIED: ICD-10-CM

## 2024-09-26 DIAGNOSIS — N13.8 BENIGN PROSTATIC HYPERPLASIA WITH LOWER URINARY TRACT SYMPMS: ICD-10-CM

## 2024-09-26 DIAGNOSIS — N40.1 BENIGN PROSTATIC HYPERPLASIA WITH LOWER URINARY TRACT SYMPMS: ICD-10-CM

## 2024-09-26 DIAGNOSIS — I10 ESSENTIAL (PRIMARY) HYPERTENSION: ICD-10-CM

## 2024-09-26 DIAGNOSIS — I49.9 CARDIAC ARRHYTHMIA, UNSPECIFIED: ICD-10-CM

## 2024-09-26 DIAGNOSIS — B18.1 CHRONIC VIRAL HEPATITIS B W/OUT DELTA-AGENT: ICD-10-CM

## 2024-09-26 DIAGNOSIS — K76.0 FATTY (CHANGE OF) LIVER, NOT ELSEWHERE CLASSIFIED: ICD-10-CM

## 2024-09-26 PROCEDURE — 99214 OFFICE O/P EST MOD 30 MIN: CPT

## 2024-09-26 NOTE — HISTORY OF PRESENT ILLNESS
[de-identified] : 69 year old  male patient with history of stable Essential Hypertension, Hypercholesterolemia, Chronic Hepatitis B, Vitamin D Deficiency, NAFLD, BPH with Urinary Obstruction, history as stated, presented for follow up examination. Patient is compliant with all medications. ROS as stated.

## 2024-09-26 NOTE — HISTORY OF PRESENT ILLNESS
[de-identified] : 69 year old  male patient with history of stable Essential Hypertension, Hypercholesterolemia, Chronic Hepatitis B, Vitamin D Deficiency, NAFLD, BPH with Urinary Obstruction, history as stated, presented for follow up examination. Patient is compliant with all medications. ROS as stated.

## 2024-09-26 NOTE — HEALTH RISK ASSESSMENT
[No] : In the past 12 months have you used drugs other than those required for medical reasons? No [No falls in past year] : Patient reported no falls in the past year [0] : 2) Feeling down, depressed, or hopeless: Not at all (0) [Former] : Former [de-identified] : CARD/URO/PULM [[id number]] : 0

## 2024-09-26 NOTE — HEALTH RISK ASSESSMENT
[No] : In the past 12 months have you used drugs other than those required for medical reasons? No [No falls in past year] : Patient reported no falls in the past year [0] : 2) Feeling down, depressed, or hopeless: Not at all (0) [Former] : Former [de-identified] : CARD/URO/PULM [[id number]] : 0

## 2024-10-22 ENCOUNTER — APPOINTMENT (OUTPATIENT)
Dept: INTERNAL MEDICINE | Facility: CLINIC | Age: 69
End: 2024-10-22
Payer: MEDICARE

## 2024-10-22 VITALS
HEART RATE: 68 BPM | SYSTOLIC BLOOD PRESSURE: 130 MMHG | TEMPERATURE: 97.2 F | WEIGHT: 223 LBS | OXYGEN SATURATION: 95 % | BODY MASS INDEX: 32 KG/M2 | DIASTOLIC BLOOD PRESSURE: 72 MMHG

## 2024-10-22 DIAGNOSIS — K57.30 DIVERTICULOSIS OF LARGE INTESTINE W/OUT PERFORATION OR ABSCESS W/OUT BLEEDING: ICD-10-CM

## 2024-10-22 DIAGNOSIS — K64.8 OTHER HEMORRHOIDS: ICD-10-CM

## 2024-10-22 DIAGNOSIS — N40.0 BENIGN PROSTATIC HYPERPLASIA WITHOUT LOWER URINARY TRACT SYMPMS: ICD-10-CM

## 2024-10-22 DIAGNOSIS — B18.1 CHRONIC VIRAL HEPATITIS B W/OUT DELTA-AGENT: ICD-10-CM

## 2024-10-22 DIAGNOSIS — K76.0 FATTY (CHANGE OF) LIVER, NOT ELSEWHERE CLASSIFIED: ICD-10-CM

## 2024-10-22 DIAGNOSIS — K44.9 DIAPHRAGMATIC HERNIA W/OUT OBSTRUCTION OR GANGRENE: ICD-10-CM

## 2024-10-22 DIAGNOSIS — E73.9 LACTOSE INTOLERANCE, UNSPECIFIED: ICD-10-CM

## 2024-10-22 DIAGNOSIS — R19.5 OTHER FECAL ABNORMALITIES: ICD-10-CM

## 2024-10-22 DIAGNOSIS — E80.4 GILBERT SYNDROME: ICD-10-CM

## 2024-10-22 DIAGNOSIS — K59.00 CONSTIPATION, UNSPECIFIED: ICD-10-CM

## 2024-10-22 PROCEDURE — 82270 OCCULT BLOOD FECES: CPT

## 2024-10-22 PROCEDURE — 99214 OFFICE O/P EST MOD 30 MIN: CPT | Mod: 25

## 2024-10-22 RX ORDER — WITCH HAZEL 50 %
50 PADS, MEDICATED (EA) TOPICAL
Qty: 1 | Refills: 3 | Status: ACTIVE | COMMUNITY
Start: 2024-10-22 | End: 1900-01-01

## 2024-10-22 RX ORDER — DOCUSATE SODIUM 100 MG/1
100 CAPSULE ORAL
Qty: 180 | Refills: 3 | Status: ACTIVE | COMMUNITY
Start: 2024-10-22 | End: 1900-01-01

## 2024-10-22 RX ORDER — HYDROCORTISONE 25 MG/G
2.5 CREAM TOPICAL
Qty: 2 | Refills: 2 | Status: ACTIVE | COMMUNITY
Start: 2024-10-22 | End: 1900-01-01

## 2024-10-23 LAB
BASOPHILS # BLD AUTO: 0.07 K/UL
BASOPHILS NFR BLD AUTO: 0.8 %
EOSINOPHIL # BLD AUTO: 0.48 K/UL
EOSINOPHIL NFR BLD AUTO: 5.6 %
FERRITIN SERPL-MCNC: 225 NG/ML
HBV DNA # SERPL NAA+PROBE: 165 IU/ML
HCT VFR BLD CALC: 49 %
HEPB DNA PCR INT: DETECTED
HEPB DNA PCR LOG: 2.22 LOGIU/ML
HGB BLD-MCNC: 16.4 G/DL
IMM GRANULOCYTES NFR BLD AUTO: 0.4 %
IRON SATN MFR SERPL: 35 %
IRON SERPL-MCNC: 124 UG/DL
LYMPHOCYTES # BLD AUTO: 2.77 K/UL
LYMPHOCYTES NFR BLD AUTO: 32.4 %
MAN DIFF?: NORMAL
MCHC RBC-ENTMCNC: 31.4 PG
MCHC RBC-ENTMCNC: 33.5 GM/DL
MCV RBC AUTO: 93.7 FL
MONOCYTES # BLD AUTO: 0.9 K/UL
MONOCYTES NFR BLD AUTO: 10.5 %
NEUTROPHILS # BLD AUTO: 4.29 K/UL
NEUTROPHILS NFR BLD AUTO: 50.3 %
PLATELET # BLD AUTO: 190 K/UL
RBC # BLD: 5.23 M/UL
RBC # FLD: 12.8 %
TIBC SERPL-MCNC: 356 UG/DL
UIBC SERPL-MCNC: 232 UG/DL
WBC # FLD AUTO: 8.54 K/UL

## 2024-10-27 LAB — HEMOCCULT STL QL IA: NEGATIVE

## 2024-11-15 ENCOUNTER — NON-APPOINTMENT (OUTPATIENT)
Age: 69
End: 2024-11-15

## 2024-11-15 ENCOUNTER — APPOINTMENT (OUTPATIENT)
Dept: CARDIOLOGY | Facility: CLINIC | Age: 69
End: 2024-11-15
Payer: MEDICARE

## 2024-11-15 VITALS
WEIGHT: 223 LBS | DIASTOLIC BLOOD PRESSURE: 85 MMHG | HEART RATE: 69 BPM | TEMPERATURE: 97.2 F | BODY MASS INDEX: 32 KG/M2 | OXYGEN SATURATION: 97 % | SYSTOLIC BLOOD PRESSURE: 155 MMHG

## 2024-11-15 DIAGNOSIS — I49.3 VENTRICULAR PREMATURE DEPOLARIZATION: ICD-10-CM

## 2024-11-15 DIAGNOSIS — I10 ESSENTIAL (PRIMARY) HYPERTENSION: ICD-10-CM

## 2024-11-15 PROCEDURE — 93000 ELECTROCARDIOGRAM COMPLETE: CPT

## 2024-11-15 PROCEDURE — 99214 OFFICE O/P EST MOD 30 MIN: CPT | Mod: 25

## 2024-11-15 RX ORDER — AMLODIPINE BESYLATE 2.5 MG/1
2.5 TABLET ORAL DAILY
Qty: 30 | Refills: 3 | Status: ACTIVE | COMMUNITY
Start: 2024-11-15 | End: 1900-01-01

## 2024-12-02 ENCOUNTER — RX RENEWAL (OUTPATIENT)
Age: 69
End: 2024-12-02

## 2024-12-17 ENCOUNTER — APPOINTMENT (OUTPATIENT)
Dept: INTERNAL MEDICINE | Facility: CLINIC | Age: 69
End: 2024-12-17
Payer: MEDICARE

## 2024-12-17 VITALS
RESPIRATION RATE: 16 BRPM | BODY MASS INDEX: 32.21 KG/M2 | TEMPERATURE: 98 F | HEIGHT: 70 IN | SYSTOLIC BLOOD PRESSURE: 154 MMHG | DIASTOLIC BLOOD PRESSURE: 90 MMHG | OXYGEN SATURATION: 97 % | WEIGHT: 225 LBS | HEART RATE: 62 BPM

## 2024-12-17 DIAGNOSIS — K76.0 FATTY (CHANGE OF) LIVER, NOT ELSEWHERE CLASSIFIED: ICD-10-CM

## 2024-12-17 DIAGNOSIS — I49.9 CARDIAC ARRHYTHMIA, UNSPECIFIED: ICD-10-CM

## 2024-12-17 DIAGNOSIS — B18.1 CHRONIC VIRAL HEPATITIS B W/OUT DELTA-AGENT: ICD-10-CM

## 2024-12-17 DIAGNOSIS — J43.9 EMPHYSEMA, UNSPECIFIED: ICD-10-CM

## 2024-12-17 DIAGNOSIS — I10 ESSENTIAL (PRIMARY) HYPERTENSION: ICD-10-CM

## 2024-12-17 DIAGNOSIS — E78.00 PURE HYPERCHOLESTEROLEMIA, UNSPECIFIED: ICD-10-CM

## 2024-12-17 PROCEDURE — 99214 OFFICE O/P EST MOD 30 MIN: CPT

## 2024-12-17 RX ORDER — AMLODIPINE BESYLATE 10 MG/1
10 TABLET ORAL
Qty: 90 | Refills: 3 | Status: ACTIVE | COMMUNITY
Start: 2024-12-17 | End: 1900-01-01

## 2025-01-16 ENCOUNTER — APPOINTMENT (OUTPATIENT)
Dept: INTERNAL MEDICINE | Facility: CLINIC | Age: 70
End: 2025-01-16
Payer: MEDICARE

## 2025-01-16 VITALS
OXYGEN SATURATION: 100 % | TEMPERATURE: 97.3 F | RESPIRATION RATE: 16 BRPM | WEIGHT: 225 LBS | SYSTOLIC BLOOD PRESSURE: 139 MMHG | BODY MASS INDEX: 32.21 KG/M2 | HEIGHT: 70 IN | DIASTOLIC BLOOD PRESSURE: 87 MMHG | HEART RATE: 66 BPM

## 2025-01-16 DIAGNOSIS — B18.1 CHRONIC VIRAL HEPATITIS B W/OUT DELTA-AGENT: ICD-10-CM

## 2025-01-16 DIAGNOSIS — E78.00 PURE HYPERCHOLESTEROLEMIA, UNSPECIFIED: ICD-10-CM

## 2025-01-16 DIAGNOSIS — I10 ESSENTIAL (PRIMARY) HYPERTENSION: ICD-10-CM

## 2025-01-16 PROCEDURE — 99213 OFFICE O/P EST LOW 20 MIN: CPT

## 2025-02-19 ENCOUNTER — APPOINTMENT (OUTPATIENT)
Dept: ELECTROPHYSIOLOGY | Facility: CLINIC | Age: 70
End: 2025-02-19
Payer: MEDICARE

## 2025-02-19 ENCOUNTER — NON-APPOINTMENT (OUTPATIENT)
Age: 70
End: 2025-02-19

## 2025-02-19 VITALS
BODY MASS INDEX: 32.78 KG/M2 | DIASTOLIC BLOOD PRESSURE: 83 MMHG | SYSTOLIC BLOOD PRESSURE: 151 MMHG | OXYGEN SATURATION: 99 % | HEART RATE: 54 BPM | RESPIRATION RATE: 16 BRPM | HEIGHT: 70 IN | TEMPERATURE: 97.8 F | WEIGHT: 229 LBS

## 2025-02-19 DIAGNOSIS — I49.3 VENTRICULAR PREMATURE DEPOLARIZATION: ICD-10-CM

## 2025-02-19 PROCEDURE — 93000 ELECTROCARDIOGRAM COMPLETE: CPT

## 2025-02-19 PROCEDURE — 99214 OFFICE O/P EST MOD 30 MIN: CPT | Mod: 25

## 2025-02-26 ENCOUNTER — APPOINTMENT (OUTPATIENT)
Dept: PULMONOLOGY | Facility: CLINIC | Age: 70
End: 2025-02-26
Payer: MEDICARE

## 2025-02-26 ENCOUNTER — APPOINTMENT (OUTPATIENT)
Dept: UROLOGY | Facility: CLINIC | Age: 70
End: 2025-02-26
Payer: MEDICARE

## 2025-02-26 VITALS
OXYGEN SATURATION: 95 % | HEIGHT: 70 IN | DIASTOLIC BLOOD PRESSURE: 75 MMHG | RESPIRATION RATE: 16 BRPM | WEIGHT: 225 LBS | SYSTOLIC BLOOD PRESSURE: 127 MMHG | TEMPERATURE: 95 F | BODY MASS INDEX: 32.21 KG/M2 | HEART RATE: 59 BPM

## 2025-02-26 VITALS
HEART RATE: 62 BPM | WEIGHT: 229 LBS | TEMPERATURE: 96.5 F | BODY MASS INDEX: 32.78 KG/M2 | HEIGHT: 70 IN | SYSTOLIC BLOOD PRESSURE: 136 MMHG | DIASTOLIC BLOOD PRESSURE: 86 MMHG | OXYGEN SATURATION: 97 %

## 2025-02-26 DIAGNOSIS — R31.29 OTHER MICROSCOPIC HEMATURIA: ICD-10-CM

## 2025-02-26 DIAGNOSIS — R39.12 POOR URINARY STREAM: ICD-10-CM

## 2025-02-26 DIAGNOSIS — N13.8 BENIGN PROSTATIC HYPERPLASIA WITH LOWER URINARY TRACT SYMPMS: ICD-10-CM

## 2025-02-26 DIAGNOSIS — N40.1 BENIGN PROSTATIC HYPERPLASIA WITH LOWER URINARY TRACT SYMPMS: ICD-10-CM

## 2025-02-26 DIAGNOSIS — R35.1 NOCTURIA: ICD-10-CM

## 2025-02-26 DIAGNOSIS — Z87.891 PERSONAL HISTORY OF NICOTINE DEPENDENCE: ICD-10-CM

## 2025-02-26 DIAGNOSIS — R91.8 OTHER NONSPECIFIC ABNORMAL FINDING OF LUNG FIELD: ICD-10-CM

## 2025-02-26 DIAGNOSIS — J43.9 EMPHYSEMA, UNSPECIFIED: ICD-10-CM

## 2025-02-26 PROCEDURE — G2211 COMPLEX E/M VISIT ADD ON: CPT

## 2025-02-26 PROCEDURE — 99214 OFFICE O/P EST MOD 30 MIN: CPT | Mod: 25

## 2025-02-26 PROCEDURE — G0296 VISIT TO DETERM LDCT ELIG: CPT

## 2025-02-26 PROCEDURE — 99214 OFFICE O/P EST MOD 30 MIN: CPT

## 2025-03-11 ENCOUNTER — APPOINTMENT (OUTPATIENT)
Dept: INTERNAL MEDICINE | Facility: CLINIC | Age: 70
End: 2025-03-11
Payer: MEDICARE

## 2025-03-11 VITALS
HEIGHT: 70 IN | SYSTOLIC BLOOD PRESSURE: 130 MMHG | DIASTOLIC BLOOD PRESSURE: 69 MMHG | WEIGHT: 225 LBS | BODY MASS INDEX: 32.21 KG/M2 | HEART RATE: 62 BPM | OXYGEN SATURATION: 96 % | TEMPERATURE: 97.7 F

## 2025-03-11 DIAGNOSIS — Z86.0100 PERSONAL HISTORY OF COLON POLYPS, UNSPECIFIED: ICD-10-CM

## 2025-03-11 DIAGNOSIS — J43.9 EMPHYSEMA, UNSPECIFIED: ICD-10-CM

## 2025-03-11 DIAGNOSIS — B18.1 CHRONIC VIRAL HEPATITIS B W/OUT DELTA-AGENT: ICD-10-CM

## 2025-03-11 DIAGNOSIS — I10 ESSENTIAL (PRIMARY) HYPERTENSION: ICD-10-CM

## 2025-03-11 DIAGNOSIS — K57.30 DIVERTICULOSIS OF LARGE INTESTINE W/OUT PERFORATION OR ABSCESS W/OUT BLEEDING: ICD-10-CM

## 2025-03-11 DIAGNOSIS — E73.9 LACTOSE INTOLERANCE, UNSPECIFIED: ICD-10-CM

## 2025-03-11 DIAGNOSIS — Z12.11 ENCOUNTER FOR SCREENING FOR MALIGNANT NEOPLASM OF COLON: ICD-10-CM

## 2025-03-11 DIAGNOSIS — K76.0 FATTY (CHANGE OF) LIVER, NOT ELSEWHERE CLASSIFIED: ICD-10-CM

## 2025-03-11 DIAGNOSIS — K62.5 HEMORRHAGE OF ANUS AND RECTUM: ICD-10-CM

## 2025-03-11 DIAGNOSIS — Z01.818 ENCOUNTER FOR OTHER PREPROCEDURAL EXAMINATION: ICD-10-CM

## 2025-03-11 DIAGNOSIS — R19.5 OTHER FECAL ABNORMALITIES: ICD-10-CM

## 2025-03-11 PROCEDURE — 99215 OFFICE O/P EST HI 40 MIN: CPT

## 2025-03-11 RX ORDER — POLYETHYLENE GLYCOL 3350 17 G/17G
17 POWDER, FOR SOLUTION ORAL
Qty: 1 | Refills: 0 | Status: ACTIVE | COMMUNITY
Start: 2025-03-11 | End: 1900-01-01

## 2025-03-12 LAB
ANION GAP SERPL CALC-SCNC: 12 MMOL/L
APPEARANCE: CLEAR
BASOPHILS # BLD AUTO: 0.08 K/UL
BASOPHILS NFR BLD AUTO: 0.9 %
BILIRUBIN URINE: NEGATIVE
BLOOD URINE: NEGATIVE
BUN SERPL-MCNC: 10 MG/DL
CALCIUM SERPL-MCNC: 9.5 MG/DL
CHLORIDE SERPL-SCNC: 106 MMOL/L
CO2 SERPL-SCNC: 24 MMOL/L
COLOR: YELLOW
CREAT SERPL-MCNC: 0.9 MG/DL
EGFRCR SERPLBLD CKD-EPI 2021: 94 ML/MIN/1.73M2
EOSINOPHIL # BLD AUTO: 0.5 K/UL
EOSINOPHIL NFR BLD AUTO: 5.3 %
GLUCOSE QUALITATIVE U: NEGATIVE MG/DL
GLUCOSE SERPL-MCNC: 85 MG/DL
HCT VFR BLD CALC: 47.8 %
HGB BLD-MCNC: 15.9 G/DL
IMM GRANULOCYTES NFR BLD AUTO: 0.2 %
KETONES URINE: NEGATIVE MG/DL
LEUKOCYTE ESTERASE URINE: NEGATIVE
LYMPHOCYTES # BLD AUTO: 3.07 K/UL
LYMPHOCYTES NFR BLD AUTO: 32.8 %
MAN DIFF?: NORMAL
MCHC RBC-ENTMCNC: 31.5 PG
MCHC RBC-ENTMCNC: 33.3 G/DL
MCV RBC AUTO: 94.8 FL
MONOCYTES # BLD AUTO: 0.9 K/UL
MONOCYTES NFR BLD AUTO: 9.6 %
NEUTROPHILS # BLD AUTO: 4.8 K/UL
NEUTROPHILS NFR BLD AUTO: 51.2 %
NITRITE URINE: NEGATIVE
PH URINE: 6.5
PLATELET # BLD AUTO: 183 K/UL
POTASSIUM SERPL-SCNC: 4.1 MMOL/L
PROTEIN URINE: NEGATIVE MG/DL
RBC # BLD: 5.04 M/UL
RBC # FLD: 12.8 %
SODIUM SERPL-SCNC: 142 MMOL/L
SPECIFIC GRAVITY URINE: 1.01
UROBILINOGEN URINE: 0.2 MG/DL
WBC # FLD AUTO: 9.37 K/UL

## 2025-03-25 ENCOUNTER — NON-APPOINTMENT (OUTPATIENT)
Age: 70
End: 2025-03-25

## 2025-03-25 VITALS — HEIGHT: 70 IN | BODY MASS INDEX: 32.21 KG/M2 | WEIGHT: 225 LBS

## 2025-03-25 DIAGNOSIS — Z87.891 PERSONAL HISTORY OF NICOTINE DEPENDENCE: ICD-10-CM

## 2025-03-26 ENCOUNTER — APPOINTMENT (OUTPATIENT)
Dept: CT IMAGING | Facility: CLINIC | Age: 70
End: 2025-03-26
Payer: MEDICARE

## 2025-03-26 PROCEDURE — 71271 CT THORAX LUNG CANCER SCR C-: CPT

## 2025-04-01 ENCOUNTER — RX RENEWAL (OUTPATIENT)
Age: 70
End: 2025-04-01

## 2025-04-03 ENCOUNTER — RX RENEWAL (OUTPATIENT)
Age: 70
End: 2025-04-03

## 2025-04-04 ENCOUNTER — APPOINTMENT (OUTPATIENT)
Dept: INTERNAL MEDICINE | Facility: HOSPITAL | Age: 70
End: 2025-04-04

## 2025-04-11 ENCOUNTER — TRANSCRIPTION ENCOUNTER (OUTPATIENT)
Age: 70
End: 2025-04-11

## 2025-04-11 ENCOUNTER — APPOINTMENT (OUTPATIENT)
Dept: INTERNAL MEDICINE | Facility: HOSPITAL | Age: 70
End: 2025-04-11
Payer: MEDICARE

## 2025-04-11 ENCOUNTER — OUTPATIENT (OUTPATIENT)
Dept: OUTPATIENT SERVICES | Facility: HOSPITAL | Age: 70
LOS: 1 days | End: 2025-04-11
Payer: MEDICARE

## 2025-04-11 VITALS
HEART RATE: 70 BPM | RESPIRATION RATE: 16 BRPM | DIASTOLIC BLOOD PRESSURE: 88 MMHG | OXYGEN SATURATION: 99 % | SYSTOLIC BLOOD PRESSURE: 138 MMHG

## 2025-04-11 VITALS
HEART RATE: 76 BPM | RESPIRATION RATE: 12 BRPM | TEMPERATURE: 98 F | SYSTOLIC BLOOD PRESSURE: 136 MMHG | OXYGEN SATURATION: 96 % | WEIGHT: 218.04 LBS | HEIGHT: 70 IN | DIASTOLIC BLOOD PRESSURE: 89 MMHG

## 2025-04-11 DIAGNOSIS — Z12.11 ENCOUNTER FOR SCREENING FOR MALIGNANT NEOPLASM OF COLON: ICD-10-CM

## 2025-04-11 PROCEDURE — 45380 COLONOSCOPY AND BIOPSY: CPT

## 2025-04-11 NOTE — ASU PATIENT PROFILE, ADULT - NSICDXPASTMEDICALHX_GEN_ALL_CORE_FT
PAST MEDICAL HISTORY:  History of BPH     History of COPD     HTN (hypertension)     Hyperlipidemia

## 2025-04-11 NOTE — ASU DISCHARGE PLAN (ADULT/PEDIATRIC) - FINANCIAL ASSISTANCE
[facility] provides services at a reduced cost to those who are determined to be eligible through [facility]’s financial assistance program. Information regarding [facility]’s financial assistance program can be found by going to [email or web address] or by calling [phone].

## 2025-04-14 ENCOUNTER — RESULT REVIEW (OUTPATIENT)
Age: 70
End: 2025-04-14

## 2025-04-14 PROCEDURE — 88305 TISSUE EXAM BY PATHOLOGIST: CPT | Mod: 26

## 2025-04-14 PROCEDURE — 45380 COLONOSCOPY AND BIOPSY: CPT

## 2025-04-14 PROCEDURE — 88305 TISSUE EXAM BY PATHOLOGIST: CPT

## 2025-04-15 LAB — SURGICAL PATHOLOGY STUDY: SIGNIFICANT CHANGE UP

## 2025-04-24 ENCOUNTER — RX RENEWAL (OUTPATIENT)
Age: 70
End: 2025-04-24

## 2025-04-25 ENCOUNTER — APPOINTMENT (OUTPATIENT)
Dept: INTERNAL MEDICINE | Facility: HOSPITAL | Age: 70
End: 2025-04-25

## 2025-05-01 NOTE — ASU PREOP CHECKLIST - NS PREOP CHK HIBICLENS NA
Last office visit date: 3/10/2025  Next appointment scheduled?: Yes        Name from pharmacy: Nortriptyline HCl Oral Capsule 25 MG         Will file in chart as: nortriptyline (PAMELOR) 25 MG capsule    Sig: TAKE 1 CAPSULE EVERY NIGHT    Disp: 90 capsule    Refills: 3    Start: 5/1/2025    Class: Eprescribe    Last ordered: 5 months ago (12/2/2024) by Chantal Guerrero MD    Last refill: 2/21/2025    Rx #: [id number]    Tricyclic Antidepressant Refill Protocol - 12 Month Protocol [id number]/01/2025 01:11 AM   Protocol Details Seen by prescribing provider or same department within the last 12 months or has a future appt in 3 months - IF FAILED PLEASE LOOK AT CHART REVIEW FOR LAST VISIT AND PROCEED ACCORDINGLY    Medication (including dose and sig) on current meds list      To be filled at: [facility] - [place], [place] - [address]     
N/A

## 2025-05-30 ENCOUNTER — APPOINTMENT (OUTPATIENT)
Dept: CARDIOLOGY | Facility: CLINIC | Age: 70
End: 2025-05-30
Payer: MEDICARE

## 2025-05-30 ENCOUNTER — NON-APPOINTMENT (OUTPATIENT)
Age: 70
End: 2025-05-30

## 2025-05-30 VITALS
BODY MASS INDEX: 31.57 KG/M2 | TEMPERATURE: 98.1 F | WEIGHT: 220 LBS | OXYGEN SATURATION: 96 % | DIASTOLIC BLOOD PRESSURE: 78 MMHG | SYSTOLIC BLOOD PRESSURE: 151 MMHG | HEART RATE: 67 BPM

## 2025-05-30 DIAGNOSIS — I10 ESSENTIAL (PRIMARY) HYPERTENSION: ICD-10-CM

## 2025-05-30 DIAGNOSIS — I49.9 CARDIAC ARRHYTHMIA, UNSPECIFIED: ICD-10-CM

## 2025-05-30 PROCEDURE — 99214 OFFICE O/P EST MOD 30 MIN: CPT | Mod: 25

## 2025-05-30 PROCEDURE — 93000 ELECTROCARDIOGRAM COMPLETE: CPT

## 2025-05-30 RX ORDER — LOSARTAN POTASSIUM 50 MG/1
50 TABLET, FILM COATED ORAL
Qty: 90 | Refills: 3 | Status: ACTIVE | COMMUNITY
Start: 2025-05-30 | End: 1900-01-01

## 2025-06-18 PROBLEM — Z87.438 PERSONAL HISTORY OF OTHER DISEASES OF MALE GENITAL ORGANS: Chronic | Status: ACTIVE | Noted: 2025-04-11

## 2025-06-18 PROBLEM — E78.5 HYPERLIPIDEMIA, UNSPECIFIED: Chronic | Status: ACTIVE | Noted: 2025-04-11

## 2025-06-18 PROBLEM — I10 ESSENTIAL (PRIMARY) HYPERTENSION: Chronic | Status: ACTIVE | Noted: 2025-04-11

## 2025-06-24 ENCOUNTER — OUTPATIENT (OUTPATIENT)
Dept: OUTPATIENT SERVICES | Facility: HOSPITAL | Age: 70
LOS: 1 days | End: 2025-06-24
Payer: MEDICARE

## 2025-06-24 ENCOUNTER — RESULT REVIEW (OUTPATIENT)
Age: 70
End: 2025-06-24

## 2025-06-24 DIAGNOSIS — I49.9 CARDIAC ARRHYTHMIA, UNSPECIFIED: ICD-10-CM

## 2025-06-24 PROCEDURE — 93306 TTE W/DOPPLER COMPLETE: CPT | Mod: 26

## 2025-06-24 PROCEDURE — 93306 TTE W/DOPPLER COMPLETE: CPT

## 2025-07-15 ENCOUNTER — APPOINTMENT (OUTPATIENT)
Dept: INTERNAL MEDICINE | Facility: CLINIC | Age: 70
End: 2025-07-15
Payer: MEDICARE

## 2025-07-15 VITALS
SYSTOLIC BLOOD PRESSURE: 144 MMHG | TEMPERATURE: 98.6 F | HEART RATE: 61 BPM | DIASTOLIC BLOOD PRESSURE: 78 MMHG | HEIGHT: 70 IN | BODY MASS INDEX: 31.64 KG/M2 | WEIGHT: 221 LBS | RESPIRATION RATE: 16 BRPM | OXYGEN SATURATION: 95 %

## 2025-07-15 PROCEDURE — 99214 OFFICE O/P EST MOD 30 MIN: CPT | Mod: 25

## 2025-07-16 LAB
ALBUMIN, RANDOM URINE: <1.2 MG/DL
APPEARANCE: CLEAR
BACTERIA: NEGATIVE /HPF
BILIRUBIN URINE: NEGATIVE
BLOOD URINE: NEGATIVE
CAST: 0 /LPF
COLOR: YELLOW
CREAT SPEC-SCNC: 34 MG/DL
EPITHELIAL CELLS: 0 /HPF
GLUCOSE QUALITATIVE U: NEGATIVE MG/DL
KETONES URINE: NEGATIVE MG/DL
LEUKOCYTE ESTERASE URINE: NEGATIVE
MICROALBUMIN/CREAT 24H UR-RTO: NORMAL MG/G
MICROSCOPIC-UA: NORMAL
NITRITE URINE: NEGATIVE
PH URINE: 7
PROTEIN URINE: NEGATIVE MG/DL
RED BLOOD CELLS URINE: 0 /HPF
SPECIFIC GRAVITY URINE: 1.01
UROBILINOGEN URINE: 0.2 MG/DL
WHITE BLOOD CELLS URINE: 0 /HPF

## 2025-07-19 ENCOUNTER — APPOINTMENT (OUTPATIENT)
Dept: INTERNAL MEDICINE | Facility: CLINIC | Age: 70
End: 2025-07-19
Payer: MEDICARE

## 2025-07-19 PROCEDURE — 36415 COLL VENOUS BLD VENIPUNCTURE: CPT

## 2025-07-20 LAB
25(OH)D3 SERPL-MCNC: 55 NG/ML
ALBUMIN SERPL ELPH-MCNC: 4.6 G/DL
ALBUMIN, RANDOM URINE: <1.2 MG/DL
ALP BLD-CCNC: 71 U/L
ALT SERPL-CCNC: 30 U/L
ANION GAP SERPL CALC-SCNC: 13 MMOL/L
APPEARANCE: CLEAR
AST SERPL-CCNC: 28 U/L
BACTERIA: NEGATIVE /HPF
BASOPHILS # BLD AUTO: 0.07 K/UL
BASOPHILS NFR BLD AUTO: 0.9 %
BILIRUB SERPL-MCNC: 1 MG/DL
BILIRUBIN URINE: NEGATIVE
BLOOD URINE: NEGATIVE
BUN SERPL-MCNC: 15 MG/DL
CALCIUM SERPL-MCNC: 9.6 MG/DL
CAST: 0 /LPF
CHLORIDE SERPL-SCNC: 108 MMOL/L
CHOLEST SERPL-MCNC: 158 MG/DL
CO2 SERPL-SCNC: 18 MMOL/L
COLOR: YELLOW
CREAT SERPL-MCNC: 0.91 MG/DL
CREAT SPEC-SCNC: 97 MG/DL
EGFRCR SERPLBLD CKD-EPI 2021: 92 ML/MIN/1.73M2
EOSINOPHIL # BLD AUTO: 0.46 K/UL
EOSINOPHIL NFR BLD AUTO: 6 %
EPITHELIAL CELLS: 0 /HPF
ESTIMATED AVERAGE GLUCOSE: 111 MG/DL
GGT SERPL-CCNC: 30 U/L
GLUCOSE QUALITATIVE U: NEGATIVE MG/DL
GLUCOSE SERPL-MCNC: 98 MG/DL
HBA1C MFR BLD HPLC: 5.5 %
HCT VFR BLD CALC: 48.4 %
HDLC SERPL-MCNC: 47 MG/DL
HGB BLD-MCNC: 15.7 G/DL
IMM GRANULOCYTES NFR BLD AUTO: 0.5 %
KETONES URINE: NEGATIVE MG/DL
LDLC SERPL-MCNC: 97 MG/DL
LEUKOCYTE ESTERASE URINE: NEGATIVE
LYMPHOCYTES # BLD AUTO: 2.08 K/UL
LYMPHOCYTES NFR BLD AUTO: 27 %
MAN DIFF?: NORMAL
MCHC RBC-ENTMCNC: 30.8 PG
MCHC RBC-ENTMCNC: 32.4 G/DL
MCV RBC AUTO: 95.1 FL
MICROALBUMIN/CREAT 24H UR-RTO: NORMAL MG/G
MICROSCOPIC-UA: NORMAL
MONOCYTES # BLD AUTO: 0.77 K/UL
MONOCYTES NFR BLD AUTO: 10 %
NEUTROPHILS # BLD AUTO: 4.28 K/UL
NEUTROPHILS NFR BLD AUTO: 55.6 %
NITRITE URINE: NEGATIVE
NONHDLC SERPL-MCNC: 111 MG/DL
PH URINE: 5.5
PLATELET # BLD AUTO: 161 K/UL
POTASSIUM SERPL-SCNC: 4.5 MMOL/L
PROT SERPL-MCNC: 7.1 G/DL
PROTEIN URINE: NEGATIVE MG/DL
PSA FREE FLD-MCNC: 42 %
PSA FREE SERPL-MCNC: 0.33 NG/ML
PSA SERPL-MCNC: 0.79 NG/ML
RBC # BLD: 5.09 M/UL
RBC # FLD: 13.2 %
RED BLOOD CELLS URINE: 1 /HPF
SODIUM SERPL-SCNC: 139 MMOL/L
SPECIFIC GRAVITY URINE: 1.02
TRIGL SERPL-MCNC: 74 MG/DL
TSH SERPL-ACNC: 1.88 UIU/ML
UROBILINOGEN URINE: 1 MG/DL
WBC # FLD AUTO: 7.7 K/UL
WHITE BLOOD CELLS URINE: 0 /HPF

## 2025-07-23 LAB — HEMOCCULT STL QL IA: NEGATIVE

## 2025-08-27 ENCOUNTER — APPOINTMENT (OUTPATIENT)
Dept: UROLOGY | Facility: CLINIC | Age: 70
End: 2025-08-27
Payer: MEDICARE

## 2025-08-27 VITALS
WEIGHT: 222 LBS | HEIGHT: 70 IN | BODY MASS INDEX: 31.78 KG/M2 | SYSTOLIC BLOOD PRESSURE: 149 MMHG | HEART RATE: 58 BPM | TEMPERATURE: 96.9 F | DIASTOLIC BLOOD PRESSURE: 88 MMHG | OXYGEN SATURATION: 97 %

## 2025-08-27 DIAGNOSIS — N13.8 BENIGN PROSTATIC HYPERPLASIA WITH LOWER URINARY TRACT SYMPMS: ICD-10-CM

## 2025-08-27 DIAGNOSIS — N40.0 BENIGN PROSTATIC HYPERPLASIA WITHOUT LOWER URINARY TRACT SYMPMS: ICD-10-CM

## 2025-08-27 DIAGNOSIS — N40.1 BENIGN PROSTATIC HYPERPLASIA WITH LOWER URINARY TRACT SYMPMS: ICD-10-CM

## 2025-08-27 PROCEDURE — 99214 OFFICE O/P EST MOD 30 MIN: CPT | Mod: 25

## 2025-08-27 PROCEDURE — 51741 ELECTRO-UROFLOWMETRY FIRST: CPT

## 2025-09-03 ENCOUNTER — APPOINTMENT (OUTPATIENT)
Dept: PULMONOLOGY | Facility: CLINIC | Age: 70
End: 2025-09-03
Payer: MEDICARE

## 2025-09-03 VITALS
OXYGEN SATURATION: 95 % | HEART RATE: 60 BPM | TEMPERATURE: 98.24 F | SYSTOLIC BLOOD PRESSURE: 134 MMHG | HEIGHT: 70 IN | BODY MASS INDEX: 31.35 KG/M2 | WEIGHT: 219 LBS | RESPIRATION RATE: 16 BRPM | DIASTOLIC BLOOD PRESSURE: 76 MMHG

## 2025-09-03 DIAGNOSIS — R91.8 OTHER NONSPECIFIC ABNORMAL FINDING OF LUNG FIELD: ICD-10-CM

## 2025-09-03 DIAGNOSIS — J43.9 EMPHYSEMA, UNSPECIFIED: ICD-10-CM

## 2025-09-03 PROCEDURE — G2211 COMPLEX E/M VISIT ADD ON: CPT

## 2025-09-03 PROCEDURE — 99214 OFFICE O/P EST MOD 30 MIN: CPT

## (undated) DEVICE — SENSOR O2 FINGER ADULT

## (undated) DEVICE — ADAPTER ENDO CHNL SINGLE USE

## (undated) DEVICE — FORCEP RADIAL JAW 4 W NDL 2.2MM 2.8MM 240CM ORANGE DISP

## (undated) DEVICE — DRSG CURITY GAUZE SPONGE 4 X 4" 12-PLY

## (undated) DEVICE — LUBRICATING JELLY ONESHOT 1.25OZ

## (undated) DEVICE — TUBING CANNULA SALTER LABS NASAL ADULT 7FT

## (undated) DEVICE — TUBING IV SET GRAVITY 3Y 100" MACRO

## (undated) DEVICE — STERIS DEFENDO 3-PIECE KIT (AIR/WATER, SUCTION & BIOPSY VALVES)

## (undated) DEVICE — FORCEP BIOPSY 2.5MM DISP

## (undated) DEVICE — RETRIEVER ROTH NET PLATINUM-UNIVERSAL

## (undated) DEVICE — SOLIDIFIER ISOLYZER 2000CC

## (undated) DEVICE — SYR LUER LOK 50CC

## (undated) DEVICE — TUBING MEDI-VAC W MAXIGRIP CONNECTORS 1/4"X6'

## (undated) DEVICE — FORMALIN CONTAINER MED

## (undated) DEVICE — NDL INJ SCLERO INTERJECT 23G

## (undated) DEVICE — KIT ENDO PROCEDURE CUST W/VLV

## (undated) DEVICE — CATH ELCTR GLIDE PRB 7FR

## (undated) DEVICE — SNARE LOOP POLY DISP 30MM LOOP

## (undated) DEVICE — SOL INJ NS 0.9% 500ML 1-PORT

## (undated) DEVICE — CLAMP BX HOT RAD JAW 3